# Patient Record
Sex: MALE | Race: WHITE | Employment: OTHER | URBAN - METROPOLITAN AREA
[De-identification: names, ages, dates, MRNs, and addresses within clinical notes are randomized per-mention and may not be internally consistent; named-entity substitution may affect disease eponyms.]

---

## 2021-01-01 ENCOUNTER — APPOINTMENT (OUTPATIENT)
Dept: GENERAL RADIOLOGY | Age: 66
DRG: 870 | End: 2021-01-01
Payer: COMMERCIAL

## 2021-01-01 ENCOUNTER — APPOINTMENT (OUTPATIENT)
Dept: CT IMAGING | Age: 66
DRG: 870 | End: 2021-01-01
Payer: COMMERCIAL

## 2021-01-01 ENCOUNTER — APPOINTMENT (OUTPATIENT)
Dept: ULTRASOUND IMAGING | Age: 66
DRG: 870 | End: 2021-01-01
Payer: COMMERCIAL

## 2021-01-01 ENCOUNTER — ANESTHESIA (OUTPATIENT)
Dept: INPATIENT UNIT | Age: 66
DRG: 870 | End: 2021-01-01
Payer: COMMERCIAL

## 2021-01-01 ENCOUNTER — APPOINTMENT (OUTPATIENT)
Dept: INTERVENTIONAL RADIOLOGY/VASCULAR | Age: 66
DRG: 870 | End: 2021-01-01
Payer: COMMERCIAL

## 2021-01-01 ENCOUNTER — ANESTHESIA EVENT (OUTPATIENT)
Dept: INPATIENT UNIT | Age: 66
DRG: 870 | End: 2021-01-01
Payer: COMMERCIAL

## 2021-01-01 ENCOUNTER — HOSPITAL ENCOUNTER (INPATIENT)
Age: 66
LOS: 28 days | DRG: 870 | End: 2021-11-24
Attending: EMERGENCY MEDICINE | Admitting: INTERNAL MEDICINE
Payer: COMMERCIAL

## 2021-01-01 VITALS
SYSTOLIC BLOOD PRESSURE: 99 MMHG | OXYGEN SATURATION: 88 % | HEART RATE: 76 BPM | BODY MASS INDEX: 32.98 KG/M2 | HEIGHT: 69 IN | WEIGHT: 222.66 LBS | DIASTOLIC BLOOD PRESSURE: 68 MMHG | RESPIRATION RATE: 20 BRPM | TEMPERATURE: 98 F

## 2021-01-01 DIAGNOSIS — J12.82 PNEUMONIA DUE TO COVID-19 VIRUS: Primary | ICD-10-CM

## 2021-01-01 DIAGNOSIS — R09.02 HYPOXEMIA: ICD-10-CM

## 2021-01-01 DIAGNOSIS — U07.1 PNEUMONIA DUE TO COVID-19 VIRUS: Primary | ICD-10-CM

## 2021-01-01 LAB
1,3 BETA-D-GLUCAN INTERP: NEGATIVE
1,3 BETA-D-GLUCAN: <31 PG/ML
ADENOVIRUS DETECTION BY PCR: NOT DETECTED
ALBUMIN SERPL-MCNC: 2.6 GM/DL (ref 3.4–5)
ALBUMIN SERPL-MCNC: 2.6 GM/DL (ref 3.4–5)
ALBUMIN SERPL-MCNC: 2.7 GM/DL (ref 3.4–5)
ALBUMIN SERPL-MCNC: 2.8 GM/DL (ref 3.4–5)
ALBUMIN SERPL-MCNC: 2.8 GM/DL (ref 3.4–5)
ALBUMIN SERPL-MCNC: 2.9 GM/DL (ref 3.4–5)
ALBUMIN SERPL-MCNC: 2.9 GM/DL (ref 3.4–5)
ALBUMIN SERPL-MCNC: 3 GM/DL (ref 3.4–5)
ALBUMIN SERPL-MCNC: 3.2 GM/DL (ref 3.4–5)
ALBUMIN SERPL-MCNC: 3.2 GM/DL (ref 3.4–5)
ALBUMIN SERPL-MCNC: 3.3 GM/DL (ref 3.4–5)
ALBUMIN SERPL-MCNC: 3.4 GM/DL (ref 3.4–5)
ALBUMIN SERPL-MCNC: 3.5 GM/DL (ref 3.4–5)
ALBUMIN SERPL-MCNC: 3.6 GM/DL (ref 3.4–5)
ALBUMIN SERPL-MCNC: 3.9 GM/DL (ref 3.4–5)
ALP BLD-CCNC: 58 IU/L (ref 40–128)
ALP BLD-CCNC: 60 IU/L (ref 40–129)
ALP BLD-CCNC: 64 IU/L (ref 40–129)
ALP BLD-CCNC: 67 IU/L (ref 40–128)
ALP BLD-CCNC: 69 IU/L (ref 40–128)
ALP BLD-CCNC: 69 IU/L (ref 40–129)
ALP BLD-CCNC: 70 IU/L (ref 40–129)
ALP BLD-CCNC: 71 IU/L (ref 40–128)
ALP BLD-CCNC: 72 IU/L (ref 40–128)
ALP BLD-CCNC: 72 IU/L (ref 40–128)
ALP BLD-CCNC: 74 IU/L (ref 40–128)
ALP BLD-CCNC: 74 IU/L (ref 40–129)
ALP BLD-CCNC: 75 IU/L (ref 40–129)
ALP BLD-CCNC: 77 IU/L (ref 40–128)
ALP BLD-CCNC: 77 IU/L (ref 40–129)
ALP BLD-CCNC: 78 IU/L (ref 40–128)
ALP BLD-CCNC: 79 IU/L (ref 40–128)
ALP BLD-CCNC: 80 IU/L (ref 40–128)
ALP BLD-CCNC: 81 IU/L (ref 40–129)
ALP BLD-CCNC: 82 IU/L (ref 40–128)
ALP BLD-CCNC: 84 IU/L (ref 40–128)
ALP BLD-CCNC: 88 IU/L (ref 40–128)
ALP BLD-CCNC: 94 IU/L (ref 40–128)
ALT SERPL-CCNC: 16 U/L (ref 10–40)
ALT SERPL-CCNC: 17 U/L (ref 10–40)
ALT SERPL-CCNC: 18 U/L (ref 10–40)
ALT SERPL-CCNC: 19 U/L (ref 10–40)
ALT SERPL-CCNC: 20 U/L (ref 10–40)
ALT SERPL-CCNC: 21 U/L (ref 10–40)
ALT SERPL-CCNC: 21 U/L (ref 10–40)
ALT SERPL-CCNC: 22 U/L (ref 10–40)
ALT SERPL-CCNC: 22 U/L (ref 10–40)
ALT SERPL-CCNC: 23 U/L (ref 10–40)
ALT SERPL-CCNC: 24 U/L (ref 10–40)
ALT SERPL-CCNC: 25 U/L (ref 10–40)
ALT SERPL-CCNC: 26 U/L (ref 10–40)
ALT SERPL-CCNC: 27 U/L (ref 10–40)
ALT SERPL-CCNC: 28 U/L (ref 10–40)
ALT SERPL-CCNC: 29 U/L (ref 10–40)
ALT SERPL-CCNC: 30 U/L (ref 10–40)
ANION GAP SERPL CALCULATED.3IONS-SCNC: 10 MMOL/L (ref 4–16)
ANION GAP SERPL CALCULATED.3IONS-SCNC: 10 MMOL/L (ref 4–16)
ANION GAP SERPL CALCULATED.3IONS-SCNC: 11 MMOL/L (ref 4–16)
ANION GAP SERPL CALCULATED.3IONS-SCNC: 11 MMOL/L (ref 4–16)
ANION GAP SERPL CALCULATED.3IONS-SCNC: 12 MMOL/L (ref 4–16)
ANION GAP SERPL CALCULATED.3IONS-SCNC: 13 MMOL/L (ref 4–16)
ANION GAP SERPL CALCULATED.3IONS-SCNC: 14 MMOL/L (ref 4–16)
ANION GAP SERPL CALCULATED.3IONS-SCNC: 14 MMOL/L (ref 4–16)
ANION GAP SERPL CALCULATED.3IONS-SCNC: 15 MMOL/L (ref 4–16)
ANION GAP SERPL CALCULATED.3IONS-SCNC: 15 MMOL/L (ref 4–16)
ANION GAP SERPL CALCULATED.3IONS-SCNC: 7 MMOL/L (ref 4–16)
ANION GAP SERPL CALCULATED.3IONS-SCNC: 7 MMOL/L (ref 4–16)
ANION GAP SERPL CALCULATED.3IONS-SCNC: 8 MMOL/L (ref 4–16)
ANION GAP SERPL CALCULATED.3IONS-SCNC: 9 MMOL/L (ref 4–16)
ANISOCYTOSIS: ABNORMAL
APTT: 29.8 SECONDS (ref 25.1–37.1)
ASPERGILLUS GALACTO AG: NEGATIVE
ASPERGILLUS GALACTO INDEX: 0.06
AST SERPL-CCNC: 15 IU/L (ref 15–37)
AST SERPL-CCNC: 17 IU/L (ref 15–37)
AST SERPL-CCNC: 18 IU/L (ref 15–37)
AST SERPL-CCNC: 18 IU/L (ref 15–37)
AST SERPL-CCNC: 19 IU/L (ref 15–37)
AST SERPL-CCNC: 21 IU/L (ref 15–37)
AST SERPL-CCNC: 22 IU/L (ref 15–37)
AST SERPL-CCNC: 23 IU/L (ref 15–37)
AST SERPL-CCNC: 24 IU/L (ref 15–37)
AST SERPL-CCNC: 26 IU/L (ref 15–37)
AST SERPL-CCNC: 27 IU/L (ref 15–37)
AST SERPL-CCNC: 28 IU/L (ref 15–37)
AST SERPL-CCNC: 29 IU/L (ref 15–37)
AST SERPL-CCNC: 29 IU/L (ref 15–37)
AST SERPL-CCNC: 30 IU/L (ref 15–37)
AST SERPL-CCNC: 31 IU/L (ref 15–37)
AST SERPL-CCNC: 32 IU/L (ref 15–37)
AST SERPL-CCNC: 33 IU/L (ref 15–37)
AST SERPL-CCNC: 35 IU/L (ref 15–37)
AST SERPL-CCNC: 37 IU/L (ref 15–37)
AST SERPL-CCNC: 37 IU/L (ref 15–37)
ATYPICAL LYMPHOCYTE ABSOLUTE COUNT: ABNORMAL
BACTERIA: ABNORMAL /HPF
BANDED NEUTROPHILS ABSOLUTE COUNT: 0.16 K/CU MM
BANDED NEUTROPHILS ABSOLUTE COUNT: 0.68 K/CU MM
BANDED NEUTROPHILS ABSOLUTE COUNT: 0.77 K/CU MM
BANDED NEUTROPHILS ABSOLUTE COUNT: 0.82 K/CU MM
BANDED NEUTROPHILS ABSOLUTE COUNT: 0.83 K/CU MM
BANDED NEUTROPHILS ABSOLUTE COUNT: 0.98 K/CU MM
BANDED NEUTROPHILS ABSOLUTE COUNT: 1.47 K/CU MM
BANDED NEUTROPHILS ABSOLUTE COUNT: 1.52 K/CU MM
BANDED NEUTROPHILS ABSOLUTE COUNT: 1.66 K/CU MM
BANDED NEUTROPHILS RELATIVE PERCENT: 10 % (ref 5–11)
BANDED NEUTROPHILS RELATIVE PERCENT: 10 % (ref 5–11)
BANDED NEUTROPHILS RELATIVE PERCENT: 18 % (ref 5–11)
BANDED NEUTROPHILS RELATIVE PERCENT: 2 % (ref 5–11)
BANDED NEUTROPHILS RELATIVE PERCENT: 22 % (ref 5–11)
BANDED NEUTROPHILS RELATIVE PERCENT: 4 % (ref 5–11)
BANDED NEUTROPHILS RELATIVE PERCENT: 4 % (ref 5–11)
BANDED NEUTROPHILS RELATIVE PERCENT: 6 % (ref 5–11)
BANDED NEUTROPHILS RELATIVE PERCENT: 6 % (ref 5–11)
BASE EXCESS MIXED: 0.5 (ref 0–1.2)
BASE EXCESS MIXED: 11.4 (ref 0–1.2)
BASE EXCESS MIXED: 16 (ref 0–1.2)
BASE EXCESS MIXED: 16.8 (ref 0–1.2)
BASE EXCESS MIXED: 17.2 (ref 0–1.2)
BASE EXCESS MIXED: 18.1 (ref 0–1.2)
BASE EXCESS MIXED: 2.8 (ref 0–1.2)
BASE EXCESS MIXED: 3 (ref 0–1.2)
BASE EXCESS MIXED: 3.5 (ref 0–1.2)
BASE EXCESS MIXED: 7.5 (ref 0–1.2)
BASE EXCESS MIXED: 8 (ref 0–1.2)
BASE EXCESS: 1 (ref 0–3.3)
BASE EXCESS: 2 (ref 0–3.3)
BASE EXCESS: 2 (ref 0–3.3)
BASE EXCESS: 3 (ref 0–3.3)
BASE EXCESS: 5 (ref 0–3.3)
BASE EXCESS: 6 (ref 0–3.3)
BASE EXCESS: 6 (ref 0–3.3)
BASE EXCESS: 7 (ref 0–3.3)
BASOPHILIC STIPPLING: PRESENT
BASOPHILIC STIPPLING: PRESENT
BASOPHILS ABSOLUTE: 0 K/CU MM
BASOPHILS ABSOLUTE: 0.1 K/CU MM
BASOPHILS ABSOLUTE: 0.1 K/CU MM
BASOPHILS RELATIVE PERCENT: 0.1 % (ref 0–1)
BASOPHILS RELATIVE PERCENT: 0.2 % (ref 0–1)
BASOPHILS RELATIVE PERCENT: 0.3 % (ref 0–1)
BASOPHILS RELATIVE PERCENT: 0.4 % (ref 0–1)
BILIRUB SERPL-MCNC: 0.5 MG/DL (ref 0–1)
BILIRUB SERPL-MCNC: 0.6 MG/DL (ref 0–1)
BILIRUB SERPL-MCNC: 0.7 MG/DL (ref 0–1)
BILIRUB SERPL-MCNC: 0.7 MG/DL (ref 0–1)
BILIRUB SERPL-MCNC: 0.8 MG/DL (ref 0–1)
BILIRUB SERPL-MCNC: 0.9 MG/DL (ref 0–1)
BILIRUB SERPL-MCNC: 1 MG/DL (ref 0–1)
BILIRUB SERPL-MCNC: 1.1 MG/DL (ref 0–1)
BILIRUB SERPL-MCNC: 1.2 MG/DL (ref 0–1)
BILIRUB SERPL-MCNC: 1.3 MG/DL (ref 0–1)
BILIRUBIN URINE: NEGATIVE MG/DL
BLOOD, URINE: ABNORMAL
BORDETELLA PARAPERTUSSIS BY PCR: NOT DETECTED
BORDETELLA PERTUSSIS PCR: NOT DETECTED
BUN BLDV-MCNC: 20 MG/DL (ref 6–23)
BUN BLDV-MCNC: 22 MG/DL (ref 6–23)
BUN BLDV-MCNC: 27 MG/DL (ref 6–23)
BUN BLDV-MCNC: 30 MG/DL (ref 6–23)
BUN BLDV-MCNC: 30 MG/DL (ref 6–23)
BUN BLDV-MCNC: 31 MG/DL (ref 6–23)
BUN BLDV-MCNC: 32 MG/DL (ref 6–23)
BUN BLDV-MCNC: 34 MG/DL (ref 6–23)
BUN BLDV-MCNC: 35 MG/DL (ref 6–23)
BUN BLDV-MCNC: 37 MG/DL (ref 6–23)
BUN BLDV-MCNC: 37 MG/DL (ref 6–23)
BUN BLDV-MCNC: 40 MG/DL (ref 6–23)
BUN BLDV-MCNC: 41 MG/DL (ref 6–23)
BUN BLDV-MCNC: 46 MG/DL (ref 6–23)
BUN BLDV-MCNC: 49 MG/DL (ref 6–23)
BUN BLDV-MCNC: 50 MG/DL (ref 6–23)
BUN BLDV-MCNC: 50 MG/DL (ref 6–23)
BUN BLDV-MCNC: 51 MG/DL (ref 6–23)
BUN BLDV-MCNC: 54 MG/DL (ref 6–23)
BUN BLDV-MCNC: 55 MG/DL (ref 6–23)
BUN BLDV-MCNC: 57 MG/DL (ref 6–23)
BUN BLDV-MCNC: 60 MG/DL (ref 6–23)
BUN BLDV-MCNC: 61 MG/DL (ref 6–23)
CALCIUM SERPL-MCNC: 8.3 MG/DL (ref 8.3–10.6)
CALCIUM SERPL-MCNC: 8.3 MG/DL (ref 8.3–10.6)
CALCIUM SERPL-MCNC: 8.5 MG/DL (ref 8.3–10.6)
CALCIUM SERPL-MCNC: 8.6 MG/DL (ref 8.3–10.6)
CALCIUM SERPL-MCNC: 8.6 MG/DL (ref 8.3–10.6)
CALCIUM SERPL-MCNC: 8.7 MG/DL (ref 8.3–10.6)
CALCIUM SERPL-MCNC: 8.7 MG/DL (ref 8.3–10.6)
CALCIUM SERPL-MCNC: 8.8 MG/DL (ref 8.3–10.6)
CALCIUM SERPL-MCNC: 8.8 MG/DL (ref 8.3–10.6)
CALCIUM SERPL-MCNC: 8.9 MG/DL (ref 8.3–10.6)
CALCIUM SERPL-MCNC: 9 MG/DL (ref 8.3–10.6)
CALCIUM SERPL-MCNC: 9.1 MG/DL (ref 8.3–10.6)
CALCIUM SERPL-MCNC: 9.3 MG/DL (ref 8.3–10.6)
CALCIUM SERPL-MCNC: 9.4 MG/DL (ref 8.3–10.6)
CALCIUM SERPL-MCNC: 9.4 MG/DL (ref 8.3–10.6)
CALCIUM SERPL-MCNC: 9.6 MG/DL (ref 8.3–10.6)
CARBON MONOXIDE, BLOOD: 1.6 % (ref 0–5)
CARBON MONOXIDE, BLOOD: 1.8 % (ref 0–5)
CARBON MONOXIDE, BLOOD: 1.8 % (ref 0–5)
CARBON MONOXIDE, BLOOD: 1.9 % (ref 0–5)
CARBON MONOXIDE, BLOOD: 2 % (ref 0–5)
CARBON MONOXIDE, BLOOD: 2 % (ref 0–5)
CARBON MONOXIDE, BLOOD: 2.1 % (ref 0–5)
CARBON MONOXIDE, BLOOD: 2.2 % (ref 0–5)
CARBON MONOXIDE, BLOOD: 2.3 % (ref 0–5)
CARBON MONOXIDE, BLOOD: 2.5 % (ref 0–5)
CARBON MONOXIDE, BLOOD: 3.1 % (ref 0–5)
CARBON MONOXIDE, BLOOD: 4.2 % (ref 0–5)
CHLAMYDOPHILA PNEUMONIA PCR: NOT DETECTED
CHLORIDE BLD-SCNC: 100 MMOL/L (ref 99–110)
CHLORIDE BLD-SCNC: 100 MMOL/L (ref 99–110)
CHLORIDE BLD-SCNC: 101 MMOL/L (ref 99–110)
CHLORIDE BLD-SCNC: 102 MMOL/L (ref 99–110)
CHLORIDE BLD-SCNC: 102 MMOL/L (ref 99–110)
CHLORIDE BLD-SCNC: 103 MMOL/L (ref 99–110)
CHLORIDE BLD-SCNC: 104 MMOL/L (ref 99–110)
CHLORIDE BLD-SCNC: 105 MMOL/L (ref 99–110)
CHLORIDE BLD-SCNC: 106 MMOL/L (ref 99–110)
CHLORIDE BLD-SCNC: 106 MMOL/L (ref 99–110)
CHLORIDE BLD-SCNC: 107 MMOL/L (ref 99–110)
CHLORIDE BLD-SCNC: 108 MMOL/L (ref 99–110)
CHLORIDE BLD-SCNC: 109 MMOL/L (ref 99–110)
CHLORIDE BLD-SCNC: 109 MMOL/L (ref 99–110)
CHLORIDE BLD-SCNC: 110 MMOL/L (ref 99–110)
CHLORIDE BLD-SCNC: 111 MMOL/L (ref 99–110)
CHLORIDE BLD-SCNC: 97 MMOL/L (ref 99–110)
CHLORIDE BLD-SCNC: 99 MMOL/L (ref 99–110)
CHLORIDE URINE RANDOM: 25 MMOL/L (ref 43–210)
CLARITY: ABNORMAL
CO2 CONTENT: 23.8 MMOL/L (ref 19–24)
CO2 CONTENT: 24.6 MMOL/L (ref 19–24)
CO2 CONTENT: 25.6 MMOL/L (ref 19–24)
CO2 CONTENT: 25.7 MMOL/L (ref 19–24)
CO2 CONTENT: 25.7 MMOL/L (ref 19–24)
CO2 CONTENT: 26 MMOL/L (ref 19–24)
CO2 CONTENT: 26.2 MMOL/L (ref 19–24)
CO2 CONTENT: 28.1 MMOL/L (ref 19–24)
CO2 CONTENT: 28.4 MMOL/L (ref 19–24)
CO2 CONTENT: 28.5 MMOL/L (ref 19–24)
CO2 CONTENT: 28.5 MMOL/L (ref 19–24)
CO2 CONTENT: 28.9 MMOL/L (ref 19–24)
CO2 CONTENT: 30.5 MMOL/L (ref 19–24)
CO2 CONTENT: 32.5 MMOL/L (ref 19–24)
CO2 CONTENT: 33.3 MMOL/L (ref 19–24)
CO2 CONTENT: 34.1 MMOL/L (ref 19–24)
CO2 CONTENT: 39.2 MMOL/L (ref 19–24)
CO2 CONTENT: 44.8 MMOL/L (ref 19–24)
CO2 CONTENT: 45.5 MMOL/L (ref 19–24)
CO2 CONTENT: 47.1 MMOL/L (ref 19–24)
CO2 CONTENT: 49.4 MMOL/L (ref 19–24)
CO2: 21 MMOL/L (ref 21–32)
CO2: 22 MMOL/L (ref 21–32)
CO2: 23 MMOL/L (ref 21–32)
CO2: 24 MMOL/L (ref 21–32)
CO2: 25 MMOL/L (ref 21–32)
CO2: 26 MMOL/L (ref 21–32)
CO2: 26 MMOL/L (ref 21–32)
CO2: 27 MMOL/L (ref 21–32)
CO2: 27 MMOL/L (ref 21–32)
CO2: 28 MMOL/L (ref 21–32)
CO2: 30 MMOL/L (ref 21–32)
CO2: 32 MMOL/L (ref 21–32)
CO2: 34 MMOL/L (ref 21–32)
CO2: 36 MMOL/L (ref 21–32)
CO2: 38 MMOL/L (ref 21–32)
CO2: 39 MMOL/L (ref 21–32)
COLOR: YELLOW
COMMENT: ABNORMAL
CORONAVIRUS 229E PCR: NOT DETECTED
CORONAVIRUS HKU1 PCR: NOT DETECTED
CORONAVIRUS NL63 PCR: NOT DETECTED
CORONAVIRUS OC43 PCR: NOT DETECTED
CREAT SERPL-MCNC: 0.6 MG/DL (ref 0.9–1.3)
CREAT SERPL-MCNC: 0.6 MG/DL (ref 0.9–1.3)
CREAT SERPL-MCNC: 0.7 MG/DL (ref 0.9–1.3)
CREAT SERPL-MCNC: 0.7 MG/DL (ref 0.9–1.3)
CREAT SERPL-MCNC: 0.8 MG/DL (ref 0.9–1.3)
CREAT SERPL-MCNC: 0.9 MG/DL (ref 0.9–1.3)
CREAT SERPL-MCNC: 1 MG/DL (ref 0.9–1.3)
CREAT SERPL-MCNC: 1.1 MG/DL (ref 0.9–1.3)
CREAT SERPL-MCNC: 1.3 MG/DL (ref 0.9–1.3)
CREAT SERPL-MCNC: 1.4 MG/DL (ref 0.9–1.3)
CREAT SERPL-MCNC: 1.6 MG/DL (ref 0.9–1.3)
CREATININE URINE: 75.8 MG/DL (ref 39–259)
CULTURE: ABNORMAL
CULTURE: ABNORMAL
CULTURE: NORMAL
D DIMER: 417 NG/ML(DDU)
D DIMER: 537 NG/ML(DDU)
D DIMER: 657 NG/ML(DDU)
D DIMER: 837 NG/ML(DDU)
DIFFERENTIAL TYPE: ABNORMAL
DOSE AMOUNT: ABNORMAL
DOSE AMOUNT: ABNORMAL
DOSE AMOUNT: NORMAL
DOSE AMOUNT: NORMAL
DOSE TIME: ABNORMAL
DOSE TIME: ABNORMAL
DOSE TIME: NORMAL
DOSE TIME: NORMAL
EKG ATRIAL RATE: 174 BPM
EKG ATRIAL RATE: 73 BPM
EKG ATRIAL RATE: 84 BPM
EKG DIAGNOSIS: NORMAL
EKG P AXIS: 33 DEGREES
EKG P AXIS: 37 DEGREES
EKG P-R INTERVAL: 136 MS
EKG P-R INTERVAL: 148 MS
EKG Q-T INTERVAL: 310 MS
EKG Q-T INTERVAL: 362 MS
EKG Q-T INTERVAL: 404 MS
EKG QRS DURATION: 90 MS
EKG QRS DURATION: 92 MS
EKG QRS DURATION: 94 MS
EKG QTC CALCULATION (BAZETT): 427 MS
EKG QTC CALCULATION (BAZETT): 445 MS
EKG QTC CALCULATION (BAZETT): 513 MS
EKG R AXIS: -11 DEGREES
EKG R AXIS: 3 DEGREES
EKG R AXIS: 6 DEGREES
EKG T AXIS: -7 DEGREES
EKG T AXIS: 221 DEGREES
EKG T AXIS: 254 DEGREES
EKG VENTRICULAR RATE: 165 BPM
EKG VENTRICULAR RATE: 73 BPM
EKG VENTRICULAR RATE: 84 BPM
EOSINOPHILS ABSOLUTE: 0 K/CU MM
EOSINOPHILS ABSOLUTE: 0.1 K/CU MM
EOSINOPHILS ABSOLUTE: 0.2 K/CU MM
EOSINOPHILS ABSOLUTE: 0.3 K/CU MM
EOSINOPHILS ABSOLUTE: 0.3 K/CU MM
EOSINOPHILS ABSOLUTE: 0.4 K/CU MM
EOSINOPHILS ABSOLUTE: 0.7 K/CU MM
EOSINOPHILS RELATIVE PERCENT: 0 % (ref 0–3)
EOSINOPHILS RELATIVE PERCENT: 0.1 % (ref 0–3)
EOSINOPHILS RELATIVE PERCENT: 0.3 % (ref 0–3)
EOSINOPHILS RELATIVE PERCENT: 0.3 % (ref 0–3)
EOSINOPHILS RELATIVE PERCENT: 0.4 % (ref 0–3)
EOSINOPHILS RELATIVE PERCENT: 0.5 % (ref 0–3)
EOSINOPHILS RELATIVE PERCENT: 0.6 % (ref 0–3)
EOSINOPHILS RELATIVE PERCENT: 0.7 % (ref 0–3)
EOSINOPHILS RELATIVE PERCENT: 0.9 % (ref 0–3)
EOSINOPHILS RELATIVE PERCENT: 1 % (ref 0–3)
EOSINOPHILS RELATIVE PERCENT: 1.2 % (ref 0–3)
EOSINOPHILS RELATIVE PERCENT: 1.2 % (ref 0–3)
EOSINOPHILS RELATIVE PERCENT: 1.9 % (ref 0–3)
EOSINOPHILS RELATIVE PERCENT: 2 % (ref 0–3)
EOSINOPHILS RELATIVE PERCENT: 8 % (ref 0–3)
ERYTHROCYTE SEDIMENTATION RATE: 37 MM/HR (ref 0–20)
FERRITIN: 1854 NG/ML (ref 30–400)
FERRITIN: 2544 NG/ML (ref 30–400)
FIBRINOGEN LEVEL: 952 MG/DL (ref 196.9–442.1)
FOLATE: 10.8 NG/ML (ref 3.1–17.5)
GFR AFRICAN AMERICAN: 52 ML/MIN/1.73M2
GFR AFRICAN AMERICAN: >60 ML/MIN/1.73M2
GFR NON-AFRICAN AMERICAN: 43 ML/MIN/1.73M2
GFR NON-AFRICAN AMERICAN: 51 ML/MIN/1.73M2
GFR NON-AFRICAN AMERICAN: 55 ML/MIN/1.73M2
GFR NON-AFRICAN AMERICAN: >60 ML/MIN/1.73M2
GLUCOSE BLD-MCNC: 106 MG/DL (ref 70–99)
GLUCOSE BLD-MCNC: 110 MG/DL (ref 70–99)
GLUCOSE BLD-MCNC: 115 MG/DL (ref 70–99)
GLUCOSE BLD-MCNC: 117 MG/DL (ref 70–99)
GLUCOSE BLD-MCNC: 118 MG/DL (ref 70–99)
GLUCOSE BLD-MCNC: 119 MG/DL (ref 70–99)
GLUCOSE BLD-MCNC: 120 MG/DL (ref 70–99)
GLUCOSE BLD-MCNC: 127 MG/DL (ref 70–99)
GLUCOSE BLD-MCNC: 128 MG/DL (ref 70–99)
GLUCOSE BLD-MCNC: 128 MG/DL (ref 70–99)
GLUCOSE BLD-MCNC: 129 MG/DL (ref 70–99)
GLUCOSE BLD-MCNC: 131 MG/DL (ref 70–99)
GLUCOSE BLD-MCNC: 134 MG/DL (ref 70–99)
GLUCOSE BLD-MCNC: 135 MG/DL (ref 70–99)
GLUCOSE BLD-MCNC: 141 MG/DL (ref 70–99)
GLUCOSE BLD-MCNC: 148 MG/DL (ref 70–99)
GLUCOSE BLD-MCNC: 150 MG/DL (ref 70–99)
GLUCOSE BLD-MCNC: 157 MG/DL (ref 70–99)
GLUCOSE BLD-MCNC: 171 MG/DL (ref 70–99)
GLUCOSE BLD-MCNC: 171 MG/DL (ref 70–99)
GLUCOSE BLD-MCNC: 175 MG/DL (ref 70–99)
GLUCOSE BLD-MCNC: 193 MG/DL (ref 70–99)
GLUCOSE BLD-MCNC: 202 MG/DL (ref 70–99)
GLUCOSE BLD-MCNC: 202 MG/DL (ref 70–99)
GLUCOSE BLD-MCNC: 82 MG/DL (ref 70–99)
GLUCOSE BLD-MCNC: 85 MG/DL (ref 70–99)
GLUCOSE BLD-MCNC: 93 MG/DL (ref 70–99)
GLUCOSE BLD-MCNC: 96 MG/DL (ref 70–99)
GLUCOSE, URINE: NEGATIVE MG/DL
GRAM SMEAR: NORMAL
HCO3 ARTERIAL: 22.1 MMOL/L (ref 18–23)
HCO3 ARTERIAL: 22.6 MMOL/L (ref 18–23)
HCO3 ARTERIAL: 23.9 MMOL/L (ref 18–23)
HCO3 ARTERIAL: 24.2 MMOL/L (ref 18–23)
HCO3 ARTERIAL: 24.2 MMOL/L (ref 18–23)
HCO3 ARTERIAL: 24.6 MMOL/L (ref 18–23)
HCO3 ARTERIAL: 24.7 MMOL/L (ref 18–23)
HCO3 ARTERIAL: 25.7 MMOL/L (ref 18–23)
HCO3 ARTERIAL: 27 MMOL/L (ref 18–23)
HCO3 ARTERIAL: 27 MMOL/L (ref 18–23)
HCO3 ARTERIAL: 27.1 MMOL/L (ref 18–23)
HCO3 ARTERIAL: 27.2 MMOL/L (ref 18–23)
HCO3 ARTERIAL: 29 MMOL/L (ref 18–23)
HCO3 ARTERIAL: 30.8 MMOL/L (ref 18–23)
HCO3 ARTERIAL: 32 MMOL/L (ref 18–23)
HCO3 ARTERIAL: 32.7 MMOL/L (ref 18–23)
HCO3 ARTERIAL: 37.5 MMOL/L (ref 18–23)
HCO3 ARTERIAL: 43.1 MMOL/L (ref 18–23)
HCO3 ARTERIAL: 43.7 MMOL/L (ref 18–23)
HCO3 ARTERIAL: 44.8 MMOL/L (ref 18–23)
HCO3 ARTERIAL: 47.1 MMOL/L (ref 18–23)
HCT VFR BLD CALC: 26.5 % (ref 42–52)
HCT VFR BLD CALC: 27.9 % (ref 42–52)
HCT VFR BLD CALC: 28.8 % (ref 42–52)
HCT VFR BLD CALC: 29.3 % (ref 42–52)
HCT VFR BLD CALC: 29.5 % (ref 42–52)
HCT VFR BLD CALC: 29.6 % (ref 42–52)
HCT VFR BLD CALC: 29.8 % (ref 42–52)
HCT VFR BLD CALC: 30.5 % (ref 42–52)
HCT VFR BLD CALC: 31.1 % (ref 42–52)
HCT VFR BLD CALC: 31.1 % (ref 42–52)
HCT VFR BLD CALC: 33.5 % (ref 42–52)
HCT VFR BLD CALC: 34.7 % (ref 42–52)
HCT VFR BLD CALC: 35.7 % (ref 42–52)
HCT VFR BLD CALC: 37.5 % (ref 42–52)
HCT VFR BLD CALC: 40.9 % (ref 42–52)
HCT VFR BLD CALC: 43.3 % (ref 42–52)
HCT VFR BLD CALC: 44.1 % (ref 42–52)
HCT VFR BLD CALC: 44.4 % (ref 42–52)
HCT VFR BLD CALC: 45.2 % (ref 42–52)
HCT VFR BLD CALC: 46.6 % (ref 42–52)
HCT VFR BLD CALC: 47.4 % (ref 42–52)
HCT VFR BLD CALC: 50.2 % (ref 42–52)
HCT VFR BLD CALC: 50.6 % (ref 42–52)
HCT VFR BLD CALC: 50.9 % (ref 42–52)
HCT VFR BLD CALC: 53.7 % (ref 42–52)
HEMOGLOBIN: 10 GM/DL (ref 13.5–18)
HEMOGLOBIN: 10 GM/DL (ref 13.5–18)
HEMOGLOBIN: 10.7 GM/DL (ref 13.5–18)
HEMOGLOBIN: 11.1 GM/DL (ref 13.5–18)
HEMOGLOBIN: 11.7 GM/DL (ref 13.5–18)
HEMOGLOBIN: 11.8 GM/DL (ref 13.5–18)
HEMOGLOBIN: 13.7 GM/DL (ref 13.5–18)
HEMOGLOBIN: 14.4 GM/DL (ref 13.5–18)
HEMOGLOBIN: 14.8 GM/DL (ref 13.5–18)
HEMOGLOBIN: 15 GM/DL (ref 13.5–18)
HEMOGLOBIN: 15.1 GM/DL (ref 13.5–18)
HEMOGLOBIN: 15.2 GM/DL (ref 13.5–18)
HEMOGLOBIN: 15.2 GM/DL (ref 13.5–18)
HEMOGLOBIN: 15.3 GM/DL (ref 13.5–18)
HEMOGLOBIN: 15.4 GM/DL (ref 13.5–18)
HEMOGLOBIN: 15.9 GM/DL (ref 13.5–18)
HEMOGLOBIN: 16 GM/DL (ref 13.5–18)
HEMOGLOBIN: 16.1 GM/DL (ref 13.5–18)
HEMOGLOBIN: 16.2 GM/DL (ref 13.5–18)
HEMOGLOBIN: 8.8 GM/DL (ref 13.5–18)
HEMOGLOBIN: 9.2 GM/DL (ref 13.5–18)
HEMOGLOBIN: 9.3 GM/DL (ref 13.5–18)
HEMOGLOBIN: 9.4 GM/DL (ref 13.5–18)
HEMOGLOBIN: 9.6 GM/DL (ref 13.5–18)
HEMOGLOBIN: 9.6 GM/DL (ref 13.5–18)
HEMOGLOBIN: 9.8 GM/DL (ref 13.5–18)
HEMOGLOBIN: 9.8 GM/DL (ref 13.5–18)
HIGH SENSITIVE C-REACTIVE PROTEIN: 104.1 MG/L
HIGH SENSITIVE C-REACTIVE PROTEIN: 187.5 MG/L
HIGH SENSITIVE C-REACTIVE PROTEIN: 236.6 MG/L
HIGH SENSITIVE C-REACTIVE PROTEIN: 248.1 MG/L
HIGH SENSITIVE C-REACTIVE PROTEIN: 257.1 MG/L
HIGH SENSITIVE C-REACTIVE PROTEIN: 45.5 MG/L
HIGH SENSITIVE C-REACTIVE PROTEIN: 57.2 MG/L
HIGH SENSITIVE C-REACTIVE PROTEIN: 63.3 MG/L
HIGH SENSITIVE C-REACTIVE PROTEIN: >300 MG/L
HIGH SENSITIVE C-REACTIVE PROTEIN: >300 MG/L
HUMAN METAPNEUMOVIRUS PCR: NOT DETECTED
IGA: 135 MG/DL (ref 69–382)
IGG,SERUM: 543 MG/DL (ref 723–1685)
IGM,SERUM: 62 MG/DL (ref 62–277)
IMMATURE NEUTROPHIL %: 0.5 % (ref 0–0.43)
IMMATURE NEUTROPHIL %: 0.9 % (ref 0–0.43)
IMMATURE NEUTROPHIL %: 0.9 % (ref 0–0.43)
IMMATURE NEUTROPHIL %: 1 % (ref 0–0.43)
IMMATURE NEUTROPHIL %: 1.1 % (ref 0–0.43)
IMMATURE NEUTROPHIL %: 1.1 % (ref 0–0.43)
IMMATURE NEUTROPHIL %: 1.3 % (ref 0–0.43)
IMMATURE NEUTROPHIL %: 1.3 % (ref 0–0.43)
IMMATURE NEUTROPHIL %: 1.4 % (ref 0–0.43)
IMMATURE NEUTROPHIL %: 1.6 % (ref 0–0.43)
IMMATURE NEUTROPHIL %: 1.7 % (ref 0–0.43)
IMMATURE NEUTROPHIL %: 2.2 % (ref 0–0.43)
IMMATURE NEUTROPHIL %: 2.3 % (ref 0–0.43)
IMMATURE NEUTROPHIL %: 2.6 % (ref 0–0.43)
IMMATURE NEUTROPHIL %: 2.6 % (ref 0–0.43)
IMMATURE NEUTROPHIL %: 2.7 % (ref 0–0.43)
IMMATURE NEUTROPHIL %: 2.9 % (ref 0–0.43)
IMMATURE NEUTROPHIL %: 4.6 % (ref 0–0.43)
INFLUENZA A BY PCR: NOT DETECTED
INFLUENZA A H1 (2009) PCR: NOT DETECTED
INFLUENZA A H1 PANDEMIC PCR: NOT DETECTED
INFLUENZA A H3 PCR: NOT DETECTED
INFLUENZA B BY PCR: NOT DETECTED
INR BLD: 1.05 INDEX
IRON: 68 UG/DL (ref 59–158)
KETONES, URINE: NEGATIVE MG/DL
LACTATE DEHYDROGENASE: 577 IU/L (ref 120–246)
LACTATE: 1 MMOL/L (ref 0.4–2)
LACTATE: 1.3 MMOL/L (ref 0.4–2)
LACTIC ACID, SEPSIS: 1.8 MMOL/L (ref 0.5–1.9)
LEGIONELLA URINARY AG: NEGATIVE
LEUKOCYTE ESTERASE, URINE: NEGATIVE
LV EF: 55 %
LVEF MODALITY: NORMAL
LYMPHOCYTES ABSOLUTE: 0.3 K/CU MM
LYMPHOCYTES ABSOLUTE: 0.3 K/CU MM
LYMPHOCYTES ABSOLUTE: 0.4 K/CU MM
LYMPHOCYTES ABSOLUTE: 0.5 K/CU MM
LYMPHOCYTES ABSOLUTE: 0.6 K/CU MM
LYMPHOCYTES ABSOLUTE: 0.7 K/CU MM
LYMPHOCYTES ABSOLUTE: 0.8 K/CU MM
LYMPHOCYTES ABSOLUTE: 0.9 K/CU MM
LYMPHOCYTES ABSOLUTE: 0.9 K/CU MM
LYMPHOCYTES ABSOLUTE: 1 K/CU MM
LYMPHOCYTES ABSOLUTE: 1.2 K/CU MM
LYMPHOCYTES RELATIVE PERCENT: 10 % (ref 24–44)
LYMPHOCYTES RELATIVE PERCENT: 11 % (ref 24–44)
LYMPHOCYTES RELATIVE PERCENT: 2 % (ref 24–44)
LYMPHOCYTES RELATIVE PERCENT: 2.6 % (ref 24–44)
LYMPHOCYTES RELATIVE PERCENT: 3 % (ref 24–44)
LYMPHOCYTES RELATIVE PERCENT: 3.1 % (ref 24–44)
LYMPHOCYTES RELATIVE PERCENT: 3.3 % (ref 24–44)
LYMPHOCYTES RELATIVE PERCENT: 3.4 % (ref 24–44)
LYMPHOCYTES RELATIVE PERCENT: 3.7 % (ref 24–44)
LYMPHOCYTES RELATIVE PERCENT: 3.8 % (ref 24–44)
LYMPHOCYTES RELATIVE PERCENT: 3.9 % (ref 24–44)
LYMPHOCYTES RELATIVE PERCENT: 4 % (ref 24–44)
LYMPHOCYTES RELATIVE PERCENT: 4.2 % (ref 24–44)
LYMPHOCYTES RELATIVE PERCENT: 4.2 % (ref 24–44)
LYMPHOCYTES RELATIVE PERCENT: 4.3 % (ref 24–44)
LYMPHOCYTES RELATIVE PERCENT: 4.6 % (ref 24–44)
LYMPHOCYTES RELATIVE PERCENT: 4.7 % (ref 24–44)
LYMPHOCYTES RELATIVE PERCENT: 5 % (ref 24–44)
LYMPHOCYTES RELATIVE PERCENT: 5 % (ref 24–44)
LYMPHOCYTES RELATIVE PERCENT: 5.4 % (ref 24–44)
LYMPHOCYTES RELATIVE PERCENT: 5.5 % (ref 24–44)
LYMPHOCYTES RELATIVE PERCENT: 6 % (ref 24–44)
LYMPHOCYTES RELATIVE PERCENT: 6 % (ref 24–44)
LYMPHOCYTES RELATIVE PERCENT: 6.3 % (ref 24–44)
LYMPHOCYTES RELATIVE PERCENT: 8.6 % (ref 24–44)
LYMPHOCYTES RELATIVE PERCENT: 9.2 % (ref 24–44)
LYMPHOCYTES RELATIVE PERCENT: 9.9 % (ref 24–44)
Lab: ABNORMAL
Lab: NORMAL
MACROCYTES: ABNORMAL
MAGNESIUM: 1.6 MG/DL (ref 1.8–2.4)
MAGNESIUM: 1.7 MG/DL (ref 1.8–2.4)
MAGNESIUM: 2 MG/DL (ref 1.8–2.4)
MAGNESIUM: 2 MG/DL (ref 1.8–2.4)
MAGNESIUM: 2.1 MG/DL (ref 1.8–2.4)
MAGNESIUM: 2.2 MG/DL (ref 1.8–2.4)
MAGNESIUM: 2.2 MG/DL (ref 1.8–2.4)
MAGNESIUM: 2.3 MG/DL (ref 1.8–2.4)
MAGNESIUM: 2.4 MG/DL (ref 1.8–2.4)
MCH RBC QN AUTO: 31.1 PG (ref 27–31)
MCH RBC QN AUTO: 31.3 PG (ref 27–31)
MCH RBC QN AUTO: 31.4 PG (ref 27–31)
MCH RBC QN AUTO: 31.5 PG (ref 27–31)
MCH RBC QN AUTO: 31.6 PG (ref 27–31)
MCH RBC QN AUTO: 31.7 PG (ref 27–31)
MCH RBC QN AUTO: 31.7 PG (ref 27–31)
MCH RBC QN AUTO: 31.8 PG (ref 27–31)
MCH RBC QN AUTO: 31.8 PG (ref 27–31)
MCH RBC QN AUTO: 31.9 PG (ref 27–31)
MCH RBC QN AUTO: 31.9 PG (ref 27–31)
MCH RBC QN AUTO: 32.1 PG (ref 27–31)
MCH RBC QN AUTO: 32.1 PG (ref 27–31)
MCH RBC QN AUTO: 32.4 PG (ref 27–31)
MCHC RBC AUTO-ENTMCNC: 30 % (ref 32–36)
MCHC RBC AUTO-ENTMCNC: 31.4 % (ref 32–36)
MCHC RBC AUTO-ENTMCNC: 31.4 % (ref 32–36)
MCHC RBC AUTO-ENTMCNC: 31.5 % (ref 32–36)
MCHC RBC AUTO-ENTMCNC: 31.8 % (ref 32–36)
MCHC RBC AUTO-ENTMCNC: 31.9 % (ref 32–36)
MCHC RBC AUTO-ENTMCNC: 31.9 % (ref 32–36)
MCHC RBC AUTO-ENTMCNC: 32 % (ref 32–36)
MCHC RBC AUTO-ENTMCNC: 32.1 % (ref 32–36)
MCHC RBC AUTO-ENTMCNC: 32.1 % (ref 32–36)
MCHC RBC AUTO-ENTMCNC: 32.2 % (ref 32–36)
MCHC RBC AUTO-ENTMCNC: 32.2 % (ref 32–36)
MCHC RBC AUTO-ENTMCNC: 32.5 % (ref 32–36)
MCHC RBC AUTO-ENTMCNC: 32.5 % (ref 32–36)
MCHC RBC AUTO-ENTMCNC: 32.6 % (ref 32–36)
MCHC RBC AUTO-ENTMCNC: 32.8 % (ref 32–36)
MCHC RBC AUTO-ENTMCNC: 32.9 % (ref 32–36)
MCHC RBC AUTO-ENTMCNC: 33 % (ref 32–36)
MCHC RBC AUTO-ENTMCNC: 33.2 % (ref 32–36)
MCHC RBC AUTO-ENTMCNC: 33.2 % (ref 32–36)
MCHC RBC AUTO-ENTMCNC: 33.3 % (ref 32–36)
MCHC RBC AUTO-ENTMCNC: 33.5 % (ref 32–36)
MCHC RBC AUTO-ENTMCNC: 33.6 % (ref 32–36)
MCHC RBC AUTO-ENTMCNC: 33.8 % (ref 32–36)
MCHC RBC AUTO-ENTMCNC: 34.2 % (ref 32–36)
MCV RBC AUTO: 100.2 FL (ref 78–100)
MCV RBC AUTO: 100.3 FL (ref 78–100)
MCV RBC AUTO: 108 FL (ref 78–100)
MCV RBC AUTO: 93.8 FL (ref 78–100)
MCV RBC AUTO: 93.8 FL (ref 78–100)
MCV RBC AUTO: 94.2 FL (ref 78–100)
MCV RBC AUTO: 94.6 FL (ref 78–100)
MCV RBC AUTO: 94.8 FL (ref 78–100)
MCV RBC AUTO: 94.9 FL (ref 78–100)
MCV RBC AUTO: 94.9 FL (ref 78–100)
MCV RBC AUTO: 95.3 FL (ref 78–100)
MCV RBC AUTO: 95.4 FL (ref 78–100)
MCV RBC AUTO: 95.5 FL (ref 78–100)
MCV RBC AUTO: 95.9 FL (ref 78–100)
MCV RBC AUTO: 96 FL (ref 78–100)
MCV RBC AUTO: 96.3 FL (ref 78–100)
MCV RBC AUTO: 96.4 FL (ref 78–100)
MCV RBC AUTO: 97 FL (ref 78–100)
MCV RBC AUTO: 97.2 FL (ref 78–100)
MCV RBC AUTO: 97.3 FL (ref 78–100)
MCV RBC AUTO: 98.4 FL (ref 78–100)
MCV RBC AUTO: 98.4 FL (ref 78–100)
MCV RBC AUTO: 98.5 FL (ref 78–100)
MCV RBC AUTO: 98.5 FL (ref 78–100)
MCV RBC AUTO: 98.6 FL (ref 78–100)
MCV RBC AUTO: 99.4 FL (ref 78–100)
MCV RBC AUTO: 99.7 FL (ref 78–100)
METAMYELOCYTES ABSOLUTE COUNT: 0.07 K/CU MM
METAMYELOCYTES ABSOLUTE COUNT: 0.08 K/CU MM
METAMYELOCYTES ABSOLUTE COUNT: 0.14 K/CU MM
METAMYELOCYTES ABSOLUTE COUNT: 0.15 K/CU MM
METAMYELOCYTES ABSOLUTE COUNT: 0.16 K/CU MM
METAMYELOCYTES ABSOLUTE COUNT: 0.18 K/CU MM
METAMYELOCYTES ABSOLUTE COUNT: 0.21 K/CU MM
METAMYELOCYTES PERCENT: 1 %
METAMYELOCYTES PERCENT: 2 %
METHEMOGLOBIN ARTERIAL: 0.5 %
METHEMOGLOBIN ARTERIAL: 0.8 %
METHEMOGLOBIN ARTERIAL: 0.9 %
METHEMOGLOBIN ARTERIAL: 0.9 %
METHEMOGLOBIN ARTERIAL: 1 %
METHEMOGLOBIN ARTERIAL: 1 %
METHEMOGLOBIN ARTERIAL: 1.1 %
METHEMOGLOBIN ARTERIAL: 1.1 %
METHEMOGLOBIN ARTERIAL: 1.2 %
METHEMOGLOBIN ARTERIAL: 1.2 %
METHEMOGLOBIN ARTERIAL: 1.3 %
METHEMOGLOBIN ARTERIAL: 1.4 %
METHEMOGLOBIN ARTERIAL: 1.5 %
METHEMOGLOBIN ARTERIAL: 1.6 %
METHEMOGLOBIN ARTERIAL: 1.6 %
METHEMOGLOBIN ARTERIAL: 1.7 %
METHEMOGLOBIN ARTERIAL: 1.8 %
MONOCYTES ABSOLUTE: 0.1 K/CU MM
MONOCYTES ABSOLUTE: 0.2 K/CU MM
MONOCYTES ABSOLUTE: 0.3 K/CU MM
MONOCYTES ABSOLUTE: 0.4 K/CU MM
MONOCYTES ABSOLUTE: 0.4 K/CU MM
MONOCYTES ABSOLUTE: 0.5 K/CU MM
MONOCYTES ABSOLUTE: 0.6 K/CU MM
MONOCYTES ABSOLUTE: 0.7 K/CU MM
MONOCYTES ABSOLUTE: 0.8 K/CU MM
MONOCYTES ABSOLUTE: 0.8 K/CU MM
MONOCYTES ABSOLUTE: 0.9 K/CU MM
MONOCYTES ABSOLUTE: 1 K/CU MM
MONOCYTES ABSOLUTE: 1.3 K/CU MM
MONOCYTES RELATIVE PERCENT: 1 % (ref 0–4)
MONOCYTES RELATIVE PERCENT: 2.2 % (ref 0–4)
MONOCYTES RELATIVE PERCENT: 2.2 % (ref 0–4)
MONOCYTES RELATIVE PERCENT: 2.9 % (ref 0–4)
MONOCYTES RELATIVE PERCENT: 3 % (ref 0–4)
MONOCYTES RELATIVE PERCENT: 3.3 % (ref 0–4)
MONOCYTES RELATIVE PERCENT: 3.3 % (ref 0–4)
MONOCYTES RELATIVE PERCENT: 3.5 % (ref 0–4)
MONOCYTES RELATIVE PERCENT: 3.6 % (ref 0–4)
MONOCYTES RELATIVE PERCENT: 3.8 % (ref 0–4)
MONOCYTES RELATIVE PERCENT: 4 % (ref 0–4)
MONOCYTES RELATIVE PERCENT: 4.1 % (ref 0–4)
MONOCYTES RELATIVE PERCENT: 4.2 % (ref 0–4)
MONOCYTES RELATIVE PERCENT: 4.3 % (ref 0–4)
MONOCYTES RELATIVE PERCENT: 4.8 % (ref 0–4)
MONOCYTES RELATIVE PERCENT: 4.8 % (ref 0–4)
MONOCYTES RELATIVE PERCENT: 4.9 % (ref 0–4)
MONOCYTES RELATIVE PERCENT: 5 % (ref 0–4)
MONOCYTES RELATIVE PERCENT: 6.8 % (ref 0–4)
MUCUS: ABNORMAL HPF
MYCOPLASMA PNEUMONIAE PCR: NOT DETECTED
MYELOCYTE PERCENT: 1 %
MYELOCYTES ABSOLUTE COUNT: 0.21 K/CU MM
NITRITE URINE, QUANTITATIVE: NEGATIVE
NUCLEATED RBC %: 0 %
NUCLEATED RBC %: 0.3 %
O2 SATURATION: 88.9 % (ref 96–97)
O2 SATURATION: 89.7 % (ref 96–97)
O2 SATURATION: 90 % (ref 96–97)
O2 SATURATION: 91.3 % (ref 96–97)
O2 SATURATION: 92 % (ref 96–97)
O2 SATURATION: 92.1 % (ref 96–97)
O2 SATURATION: 92.1 % (ref 96–97)
O2 SATURATION: 92.9 % (ref 96–97)
O2 SATURATION: 93.3 % (ref 96–97)
O2 SATURATION: 93.8 % (ref 96–97)
O2 SATURATION: 94 % (ref 96–97)
O2 SATURATION: 94 % (ref 96–97)
O2 SATURATION: 94.6 % (ref 96–97)
O2 SATURATION: 94.6 % (ref 96–97)
O2 SATURATION: 95 % (ref 96–97)
O2 SATURATION: 95 % (ref 96–97)
O2 SATURATION: 95.1 % (ref 96–97)
O2 SATURATION: 95.1 % (ref 96–97)
O2 SATURATION: 95.2 % (ref 96–97)
O2 SATURATION: 95.4 % (ref 96–97)
O2 SATURATION: 96.3 % (ref 96–97)
PARAINFLUENZA 1 PCR: NOT DETECTED
PARAINFLUENZA 2 PCR: NOT DETECTED
PARAINFLUENZA 3 PCR: NOT DETECTED
PARAINFLUENZA 4 PCR: NOT DETECTED
PCO2 ARTERIAL: 37 MMHG (ref 32–45)
PCO2 ARTERIAL: 40 MMHG (ref 32–45)
PCO2 ARTERIAL: 41 MMHG (ref 32–45)
PCO2 ARTERIAL: 47 MMHG (ref 32–45)
PCO2 ARTERIAL: 47 MMHG (ref 32–45)
PCO2 ARTERIAL: 48 MMHG (ref 32–45)
PCO2 ARTERIAL: 48 MMHG (ref 32–45)
PCO2 ARTERIAL: 49 MMHG (ref 32–45)
PCO2 ARTERIAL: 50 MMHG (ref 32–45)
PCO2 ARTERIAL: 50 MMHG (ref 32–45)
PCO2 ARTERIAL: 54 MMHG (ref 32–45)
PCO2 ARTERIAL: 57 MMHG (ref 32–45)
PCO2 ARTERIAL: 59 MMHG (ref 32–45)
PCO2 ARTERIAL: 60 MMHG (ref 32–45)
PCO2 ARTERIAL: 65 MMHG (ref 32–45)
PCO2 ARTERIAL: 67 MMHG (ref 32–45)
PCO2 ARTERIAL: 74 MMHG (ref 32–45)
PCO2 ARTERIAL: 76 MMHG (ref 32–45)
PCO2 ARTERIAL: 79 MMHG (ref 32–45)
PCT TRANSFERRIN: 47 % (ref 10–44)
PDW BLD-RTO: 13 % (ref 11.7–14.9)
PDW BLD-RTO: 13.2 % (ref 11.7–14.9)
PDW BLD-RTO: 13.3 % (ref 11.7–14.9)
PDW BLD-RTO: 13.4 % (ref 11.7–14.9)
PDW BLD-RTO: 13.5 % (ref 11.7–14.9)
PDW BLD-RTO: 13.6 % (ref 11.7–14.9)
PDW BLD-RTO: 13.9 % (ref 11.7–14.9)
PDW BLD-RTO: 14.2 % (ref 11.7–14.9)
PDW BLD-RTO: 14.3 % (ref 11.7–14.9)
PDW BLD-RTO: 14.4 % (ref 11.7–14.9)
PDW BLD-RTO: 14.5 % (ref 11.7–14.9)
PDW BLD-RTO: 14.6 % (ref 11.7–14.9)
PDW BLD-RTO: 14.6 % (ref 11.7–14.9)
PDW BLD-RTO: 14.8 % (ref 11.7–14.9)
PDW BLD-RTO: 15 % (ref 11.7–14.9)
PDW BLD-RTO: 15.1 % (ref 11.7–14.9)
PH BLOOD: 7.12 (ref 7.34–7.45)
PH BLOOD: 7.15 (ref 7.34–7.45)
PH BLOOD: 7.16 (ref 7.34–7.45)
PH BLOOD: 7.22 (ref 7.34–7.45)
PH BLOOD: 7.27 (ref 7.34–7.45)
PH BLOOD: 7.31 (ref 7.34–7.45)
PH BLOOD: 7.32 (ref 7.34–7.45)
PH BLOOD: 7.32 (ref 7.34–7.45)
PH BLOOD: 7.34 (ref 7.34–7.45)
PH BLOOD: 7.38 (ref 7.34–7.45)
PH BLOOD: 7.39 (ref 7.34–7.45)
PH BLOOD: 7.4 (ref 7.34–7.45)
PH BLOOD: 7.43 (ref 7.34–7.45)
PH BLOOD: 7.44 (ref 7.34–7.45)
PH BLOOD: 7.45 (ref 7.34–7.45)
PH BLOOD: 7.45 (ref 7.34–7.45)
PH BLOOD: 7.47 (ref 7.34–7.45)
PH BLOOD: 7.5 (ref 7.34–7.45)
PH BLOOD: 7.51 (ref 7.34–7.45)
PH, URINE: 5 (ref 5–8)
PHOSPHORUS: 2.5 MG/DL (ref 2.5–4.9)
PHOSPHORUS: 2.7 MG/DL (ref 2.5–4.9)
PHOSPHORUS: 2.7 MG/DL (ref 2.5–4.9)
PHOSPHORUS: 2.8 MG/DL (ref 2.5–4.9)
PHOSPHORUS: 3 MG/DL (ref 2.5–4.9)
PHOSPHORUS: 3.1 MG/DL (ref 2.5–4.9)
PHOSPHORUS: 3.1 MG/DL (ref 2.5–4.9)
PLATELET # BLD: 117 K/CU MM (ref 140–440)
PLATELET # BLD: 127 K/CU MM (ref 140–440)
PLATELET # BLD: 132 K/CU MM (ref 140–440)
PLATELET # BLD: 135 K/CU MM (ref 140–440)
PLATELET # BLD: 136 K/CU MM (ref 140–440)
PLATELET # BLD: 137 K/CU MM (ref 140–440)
PLATELET # BLD: 138 K/CU MM (ref 140–440)
PLATELET # BLD: 143 K/CU MM (ref 140–440)
PLATELET # BLD: 153 K/CU MM (ref 140–440)
PLATELET # BLD: 157 K/CU MM (ref 140–440)
PLATELET # BLD: 160 K/CU MM (ref 140–440)
PLATELET # BLD: 165 K/CU MM (ref 140–440)
PLATELET # BLD: 171 K/CU MM (ref 140–440)
PLATELET # BLD: 182 K/CU MM (ref 140–440)
PLATELET # BLD: 195 K/CU MM (ref 140–440)
PLATELET # BLD: 218 K/CU MM (ref 140–440)
PLATELET # BLD: 294 K/CU MM (ref 140–440)
PLATELET # BLD: 302 K/CU MM (ref 140–440)
PLATELET # BLD: 305 K/CU MM (ref 140–440)
PLATELET # BLD: 313 K/CU MM (ref 140–440)
PLATELET # BLD: 330 K/CU MM (ref 140–440)
PLATELET # BLD: 334 K/CU MM (ref 140–440)
PLATELET # BLD: 344 K/CU MM (ref 140–440)
PLATELET # BLD: 355 K/CU MM (ref 140–440)
PLATELET # BLD: 366 K/CU MM (ref 140–440)
PLATELET # BLD: 388 K/CU MM (ref 140–440)
PLATELET # BLD: 403 K/CU MM (ref 140–440)
PLT MORPHOLOGY: ABNORMAL
PLT MORPHOLOGY: ADEQUATE
PMV BLD AUTO: 10 FL (ref 7.5–11.1)
PMV BLD AUTO: 10.1 FL (ref 7.5–11.1)
PMV BLD AUTO: 10.2 FL (ref 7.5–11.1)
PMV BLD AUTO: 10.2 FL (ref 7.5–11.1)
PMV BLD AUTO: 10.3 FL (ref 7.5–11.1)
PMV BLD AUTO: 10.4 FL (ref 7.5–11.1)
PMV BLD AUTO: 10.4 FL (ref 7.5–11.1)
PMV BLD AUTO: 10.6 FL (ref 7.5–11.1)
PMV BLD AUTO: 10.7 FL (ref 7.5–11.1)
PMV BLD AUTO: 10.8 FL (ref 7.5–11.1)
PMV BLD AUTO: 10.8 FL (ref 7.5–11.1)
PMV BLD AUTO: 11 FL (ref 7.5–11.1)
PMV BLD AUTO: 11 FL (ref 7.5–11.1)
PMV BLD AUTO: 11.1 FL (ref 7.5–11.1)
PMV BLD AUTO: 11.1 FL (ref 7.5–11.1)
PMV BLD AUTO: 11.2 FL (ref 7.5–11.1)
PMV BLD AUTO: 11.3 FL (ref 7.5–11.1)
PMV BLD AUTO: 11.3 FL (ref 7.5–11.1)
PMV BLD AUTO: 11.4 FL (ref 7.5–11.1)
PMV BLD AUTO: 11.4 FL (ref 7.5–11.1)
PMV BLD AUTO: 11.5 FL (ref 7.5–11.1)
PMV BLD AUTO: 11.5 FL (ref 7.5–11.1)
PO2 ARTERIAL: 119 MMHG (ref 75–100)
PO2 ARTERIAL: 122 MMHG (ref 75–100)
PO2 ARTERIAL: 136 MMHG (ref 75–100)
PO2 ARTERIAL: 151 MMHG (ref 75–100)
PO2 ARTERIAL: 54 MMHG (ref 75–100)
PO2 ARTERIAL: 56 MMHG (ref 75–100)
PO2 ARTERIAL: 59 MMHG (ref 75–100)
PO2 ARTERIAL: 65 MMHG (ref 75–100)
PO2 ARTERIAL: 67 MMHG (ref 75–100)
PO2 ARTERIAL: 69 MMHG (ref 75–100)
PO2 ARTERIAL: 71 MMHG (ref 75–100)
PO2 ARTERIAL: 71 MMHG (ref 75–100)
PO2 ARTERIAL: 73 MMHG (ref 75–100)
PO2 ARTERIAL: 79 MMHG (ref 75–100)
PO2 ARTERIAL: 80 MMHG (ref 75–100)
PO2 ARTERIAL: 82 MMHG (ref 75–100)
PO2 ARTERIAL: 83 MMHG (ref 75–100)
PO2 ARTERIAL: 87 MMHG (ref 75–100)
PO2 ARTERIAL: 91 MMHG (ref 75–100)
POLYCHROMASIA: ABNORMAL
POTASSIUM SERPL-SCNC: 3.5 MMOL/L (ref 3.5–5.1)
POTASSIUM SERPL-SCNC: 3.6 MMOL/L (ref 3.5–5.1)
POTASSIUM SERPL-SCNC: 3.7 MMOL/L (ref 3.5–5.1)
POTASSIUM SERPL-SCNC: 3.7 MMOL/L (ref 3.5–5.1)
POTASSIUM SERPL-SCNC: 3.8 MMOL/L (ref 3.5–5.1)
POTASSIUM SERPL-SCNC: 3.9 MMOL/L (ref 3.5–5.1)
POTASSIUM SERPL-SCNC: 4 MMOL/L (ref 3.5–5.1)
POTASSIUM SERPL-SCNC: 4 MMOL/L (ref 3.5–5.1)
POTASSIUM SERPL-SCNC: 4.1 MMOL/L (ref 3.5–5.1)
POTASSIUM SERPL-SCNC: 4.2 MMOL/L (ref 3.5–5.1)
POTASSIUM SERPL-SCNC: 4.4 MMOL/L (ref 3.5–5.1)
POTASSIUM SERPL-SCNC: 4.5 MMOL/L (ref 3.5–5.1)
POTASSIUM SERPL-SCNC: 4.6 MMOL/L (ref 3.5–5.1)
POTASSIUM SERPL-SCNC: 4.7 MMOL/L (ref 3.5–5.1)
POTASSIUM SERPL-SCNC: 4.8 MMOL/L (ref 3.5–5.1)
POTASSIUM SERPL-SCNC: 4.8 MMOL/L (ref 3.5–5.1)
POTASSIUM SERPL-SCNC: 5.1 MMOL/L (ref 3.5–5.1)
POTASSIUM SERPL-SCNC: 5.1 MMOL/L (ref 3.5–5.1)
POTASSIUM SERPL-SCNC: 5.2 MMOL/L (ref 3.5–5.1)
POTASSIUM SERPL-SCNC: 5.4 MMOL/L (ref 3.5–5.1)
POTASSIUM, UR: 18.7 MMOL/L (ref 22–119)
PRO-BNP: 84.3 PG/ML
PROCALCITONIN: 0.06
PROCALCITONIN: 0.09
PROCALCITONIN: 0.18
PROCALCITONIN: 0.19
PROCALCITONIN: 0.22
PROCALCITONIN: 0.23
PROCALCITONIN: 0.28
PROCALCITONIN: 0.53
PROMYELOCYTES ABSOLUTE COUNT: 0.08 K/CU MM
PROMYELOCYTES PERCENT: 1 %
PROT/CREAT RATIO, UR: 1.3
PROTEIN UA: 30 MG/DL
PROTHROMBIN TIME: 13.5 SECONDS (ref 11.7–14.5)
RBC # BLD: 2.8 M/CU MM (ref 4.6–6.2)
RBC # BLD: 2.94 M/CU MM (ref 4.6–6.2)
RBC # BLD: 2.97 M/CU MM (ref 4.6–6.2)
RBC # BLD: 2.99 M/CU MM (ref 4.6–6.2)
RBC # BLD: 3.02 M/CU MM (ref 4.6–6.2)
RBC # BLD: 3.06 M/CU MM (ref 4.6–6.2)
RBC # BLD: 3.1 M/CU MM (ref 4.6–6.2)
RBC # BLD: 3.12 M/CU MM (ref 4.6–6.2)
RBC # BLD: 3.13 M/CU MM (ref 4.6–6.2)
RBC # BLD: 3.2 M/CU MM (ref 4.6–6.2)
RBC # BLD: 3.4 M/CU MM (ref 4.6–6.2)
RBC # BLD: 3.52 M/CU MM (ref 4.6–6.2)
RBC # BLD: 3.72 M/CU MM (ref 4.6–6.2)
RBC # BLD: 3.74 M/CU MM (ref 4.6–6.2)
RBC # BLD: 4.31 M/CU MM (ref 4.6–6.2)
RBC # BLD: 4.54 M/CU MM (ref 4.6–6.2)
RBC # BLD: 4.66 M/CU MM (ref 4.6–6.2)
RBC # BLD: 4.7 M/CU MM (ref 4.6–6.2)
RBC # BLD: 4.77 M/CU MM (ref 4.6–6.2)
RBC # BLD: 4.8 M/CU MM (ref 4.6–6.2)
RBC # BLD: 4.82 M/CU MM (ref 4.6–6.2)
RBC # BLD: 4.86 M/CU MM (ref 4.6–6.2)
RBC # BLD: 4.87 M/CU MM (ref 4.6–6.2)
RBC # BLD: 4.97 M/CU MM (ref 4.6–6.2)
RBC # BLD: 5.05 M/CU MM (ref 4.6–6.2)
RBC # BLD: 5.1 M/CU MM (ref 4.6–6.2)
RBC # BLD: 5.17 M/CU MM (ref 4.6–6.2)
RBC URINE: 157 /HPF (ref 0–3)
RHINOVIRUS ENTEROVIRUS PCR: NOT DETECTED
RSV PCR: NOT DETECTED
SARS-COV-2: ABNORMAL
SEGMENTED NEUTROPHILS ABSOLUTE COUNT: 10 K/CU MM
SEGMENTED NEUTROPHILS ABSOLUTE COUNT: 10.6 K/CU MM
SEGMENTED NEUTROPHILS ABSOLUTE COUNT: 11.4 K/CU MM
SEGMENTED NEUTROPHILS ABSOLUTE COUNT: 12 K/CU MM
SEGMENTED NEUTROPHILS ABSOLUTE COUNT: 12.1 K/CU MM
SEGMENTED NEUTROPHILS ABSOLUTE COUNT: 13.8 K/CU MM
SEGMENTED NEUTROPHILS ABSOLUTE COUNT: 14.3 K/CU MM
SEGMENTED NEUTROPHILS ABSOLUTE COUNT: 15.3 K/CU MM
SEGMENTED NEUTROPHILS ABSOLUTE COUNT: 15.5 K/CU MM
SEGMENTED NEUTROPHILS ABSOLUTE COUNT: 16.6 K/CU MM
SEGMENTED NEUTROPHILS ABSOLUTE COUNT: 16.7 K/CU MM
SEGMENTED NEUTROPHILS ABSOLUTE COUNT: 16.8 K/CU MM
SEGMENTED NEUTROPHILS ABSOLUTE COUNT: 17.3 K/CU MM
SEGMENTED NEUTROPHILS ABSOLUTE COUNT: 18.4 K/CU MM
SEGMENTED NEUTROPHILS ABSOLUTE COUNT: 19.6 K/CU MM
SEGMENTED NEUTROPHILS ABSOLUTE COUNT: 21.5 K/CU MM
SEGMENTED NEUTROPHILS ABSOLUTE COUNT: 22.9 K/CU MM
SEGMENTED NEUTROPHILS ABSOLUTE COUNT: 4.9 K/CU MM
SEGMENTED NEUTROPHILS ABSOLUTE COUNT: 5.5 K/CU MM
SEGMENTED NEUTROPHILS ABSOLUTE COUNT: 5.7 K/CU MM
SEGMENTED NEUTROPHILS ABSOLUTE COUNT: 5.8 K/CU MM
SEGMENTED NEUTROPHILS ABSOLUTE COUNT: 6.7 K/CU MM
SEGMENTED NEUTROPHILS ABSOLUTE COUNT: 7.4 K/CU MM
SEGMENTED NEUTROPHILS ABSOLUTE COUNT: 7.5 K/CU MM
SEGMENTED NEUTROPHILS ABSOLUTE COUNT: 8.1 K/CU MM
SEGMENTED NEUTROPHILS ABSOLUTE COUNT: 8.5 K/CU MM
SEGMENTED NEUTROPHILS ABSOLUTE COUNT: 8.6 K/CU MM
SEGMENTED NEUTROPHILS RELATIVE PERCENT: 63 % (ref 36–66)
SEGMENTED NEUTROPHILS RELATIVE PERCENT: 71 % (ref 36–66)
SEGMENTED NEUTROPHILS RELATIVE PERCENT: 75 % (ref 36–66)
SEGMENTED NEUTROPHILS RELATIVE PERCENT: 79.4 % (ref 36–66)
SEGMENTED NEUTROPHILS RELATIVE PERCENT: 82 % (ref 36–66)
SEGMENTED NEUTROPHILS RELATIVE PERCENT: 83 % (ref 36–66)
SEGMENTED NEUTROPHILS RELATIVE PERCENT: 84 % (ref 36–66)
SEGMENTED NEUTROPHILS RELATIVE PERCENT: 85 % (ref 36–66)
SEGMENTED NEUTROPHILS RELATIVE PERCENT: 85.4 % (ref 36–66)
SEGMENTED NEUTROPHILS RELATIVE PERCENT: 85.7 % (ref 36–66)
SEGMENTED NEUTROPHILS RELATIVE PERCENT: 86 % (ref 36–66)
SEGMENTED NEUTROPHILS RELATIVE PERCENT: 86.3 % (ref 36–66)
SEGMENTED NEUTROPHILS RELATIVE PERCENT: 86.6 % (ref 36–66)
SEGMENTED NEUTROPHILS RELATIVE PERCENT: 88.1 % (ref 36–66)
SEGMENTED NEUTROPHILS RELATIVE PERCENT: 88.3 % (ref 36–66)
SEGMENTED NEUTROPHILS RELATIVE PERCENT: 88.5 % (ref 36–66)
SEGMENTED NEUTROPHILS RELATIVE PERCENT: 88.7 % (ref 36–66)
SEGMENTED NEUTROPHILS RELATIVE PERCENT: 88.9 % (ref 36–66)
SEGMENTED NEUTROPHILS RELATIVE PERCENT: 89 % (ref 36–66)
SEGMENTED NEUTROPHILS RELATIVE PERCENT: 89.3 % (ref 36–66)
SEGMENTED NEUTROPHILS RELATIVE PERCENT: 90.3 % (ref 36–66)
SEGMENTED NEUTROPHILS RELATIVE PERCENT: 90.5 % (ref 36–66)
SEGMENTED NEUTROPHILS RELATIVE PERCENT: 90.5 % (ref 36–66)
SEGMENTED NEUTROPHILS RELATIVE PERCENT: 90.7 % (ref 36–66)
SEGMENTED NEUTROPHILS RELATIVE PERCENT: 90.8 % (ref 36–66)
SEGMENTED NEUTROPHILS RELATIVE PERCENT: 91 % (ref 36–66)
SEGMENTED NEUTROPHILS RELATIVE PERCENT: 93.7 % (ref 36–66)
SODIUM BLD-SCNC: 132 MMOL/L (ref 135–145)
SODIUM BLD-SCNC: 135 MMOL/L (ref 135–145)
SODIUM BLD-SCNC: 136 MMOL/L (ref 135–145)
SODIUM BLD-SCNC: 137 MMOL/L (ref 135–145)
SODIUM BLD-SCNC: 138 MMOL/L (ref 135–145)
SODIUM BLD-SCNC: 139 MMOL/L (ref 135–145)
SODIUM BLD-SCNC: 140 MMOL/L (ref 135–145)
SODIUM BLD-SCNC: 141 MMOL/L (ref 135–145)
SODIUM BLD-SCNC: 142 MMOL/L (ref 135–145)
SODIUM BLD-SCNC: 143 MMOL/L (ref 135–145)
SODIUM BLD-SCNC: 145 MMOL/L (ref 135–145)
SODIUM BLD-SCNC: 145 MMOL/L (ref 135–145)
SODIUM BLD-SCNC: 147 MMOL/L (ref 135–145)
SODIUM BLD-SCNC: 149 MMOL/L (ref 135–145)
SODIUM BLD-SCNC: 149 MMOL/L (ref 135–145)
SODIUM BLD-SCNC: 152 MMOL/L (ref 135–145)
SODIUM BLD-SCNC: 152 MMOL/L (ref 135–145)
SODIUM URINE: 41 MMOL/L (ref 35–167)
SPECIFIC GRAVITY UA: 1.02 (ref 1–1.03)
SPECIMEN: ABNORMAL
SPECIMEN: NORMAL
SQUAMOUS EPITHELIAL: <1 /HPF
STOMATOCYTES: ABNORMAL
STREP PNEUMONIAE ANTIGEN: NORMAL
STRONGYLOIDES ANTIBODY: 0.1 IV
TOTAL IMMATURE NEUTOROPHIL: 0.05 K/CU MM
TOTAL IMMATURE NEUTOROPHIL: 0.15 K/CU MM
TOTAL IMMATURE NEUTOROPHIL: 0.15 K/CU MM
TOTAL IMMATURE NEUTOROPHIL: 0.16 K/CU MM
TOTAL IMMATURE NEUTOROPHIL: 0.17 K/CU MM
TOTAL IMMATURE NEUTOROPHIL: 0.17 K/CU MM
TOTAL IMMATURE NEUTOROPHIL: 0.18 K/CU MM
TOTAL IMMATURE NEUTOROPHIL: 0.18 K/CU MM
TOTAL IMMATURE NEUTOROPHIL: 0.2 K/CU MM
TOTAL IMMATURE NEUTOROPHIL: 0.21 K/CU MM
TOTAL IMMATURE NEUTOROPHIL: 0.24 K/CU MM
TOTAL IMMATURE NEUTOROPHIL: 0.25 K/CU MM
TOTAL IMMATURE NEUTOROPHIL: 0.27 K/CU MM
TOTAL IMMATURE NEUTOROPHIL: 0.32 K/CU MM
TOTAL IMMATURE NEUTOROPHIL: 0.36 K/CU MM
TOTAL IMMATURE NEUTOROPHIL: 0.41 K/CU MM
TOTAL IMMATURE NEUTOROPHIL: 0.44 K/CU MM
TOTAL IMMATURE NEUTOROPHIL: 0.59 K/CU MM
TOTAL IRON BINDING CAPACITY: 144 UG/DL (ref 250–450)
TOTAL NUCLEATED RBC: 0 K/CU MM
TOTAL PROTEIN: 4.7 GM/DL (ref 6.4–8.2)
TOTAL PROTEIN: 4.7 GM/DL (ref 6.4–8.2)
TOTAL PROTEIN: 4.8 GM/DL (ref 6.4–8.2)
TOTAL PROTEIN: 5 GM/DL (ref 6.4–8.2)
TOTAL PROTEIN: 5 GM/DL (ref 6.4–8.2)
TOTAL PROTEIN: 5.1 GM/DL (ref 6.4–8.2)
TOTAL PROTEIN: 5.1 GM/DL (ref 6.4–8.2)
TOTAL PROTEIN: 5.4 GM/DL (ref 6.4–8.2)
TOTAL PROTEIN: 5.4 GM/DL (ref 6.4–8.2)
TOTAL PROTEIN: 5.7 GM/DL (ref 6.4–8.2)
TOTAL PROTEIN: 5.8 GM/DL (ref 6.4–8.2)
TOTAL PROTEIN: 5.9 GM/DL (ref 6.4–8.2)
TOTAL PROTEIN: 5.9 GM/DL (ref 6.4–8.2)
TOTAL PROTEIN: 6.1 GM/DL (ref 6.4–8.2)
TOTAL PROTEIN: 6.2 GM/DL (ref 6.4–8.2)
TOTAL PROTEIN: 6.2 GM/DL (ref 6.4–8.2)
TOTAL PROTEIN: 6.4 GM/DL (ref 6.4–8.2)
TOTAL PROTEIN: 6.5 GM/DL (ref 6.4–8.2)
TOTAL PROTEIN: 6.6 GM/DL (ref 6.4–8.2)
TOXIC GRANULATION: PRESENT
TRICHOMONAS: ABNORMAL /HPF
TRIGL SERPL-MCNC: 242 MG/DL
TRIGL SERPL-MCNC: 293 MG/DL
TROPONIN T: <0.01 NG/ML
TROPONIN T: <0.01 NG/ML
UNSATURATED IRON BINDING CAPACITY: 76 UG/DL (ref 110–370)
URINE TOTAL PROTEIN: 96.5 MG/DL
UROBILINOGEN, URINE: NEGATIVE MG/DL (ref 0.2–1)
VANCOMYCIN RANDOM: 22.8 UG/ML
VANCOMYCIN RANDOM: 29.1 UG/ML
VANCOMYCIN TROUGH: 7.8 UG/ML (ref 10–20)
VANCOMYCIN TROUGH: 9.6 UG/ML (ref 10–20)
VITAMIN B-12: 1636 PG/ML (ref 211–911)
VITAMIN D 25-HYDROXY: 41.98 NG/ML
WBC # BLD: 11 K/CU MM (ref 4–10.5)
WBC # BLD: 11.7 K/CU MM (ref 4–10.5)
WBC # BLD: 13.5 K/CU MM (ref 4–10.5)
WBC # BLD: 13.6 K/CU MM (ref 4–10.5)
WBC # BLD: 14.7 K/CU MM (ref 4–10.5)
WBC # BLD: 16.2 K/CU MM (ref 4–10.5)
WBC # BLD: 16.3 K/CU MM (ref 4–10.5)
WBC # BLD: 16.9 K/CU MM (ref 4–10.5)
WBC # BLD: 17 K/CU MM (ref 4–10.5)
WBC # BLD: 17.8 K/CU MM (ref 4–10.5)
WBC # BLD: 18.7 K/CU MM (ref 4–10.5)
WBC # BLD: 18.8 K/CU MM (ref 4–10.5)
WBC # BLD: 20.6 K/CU MM (ref 4–10.5)
WBC # BLD: 20.8 K/CU MM (ref 4–10.5)
WBC # BLD: 22.2 K/CU MM (ref 4–10.5)
WBC # BLD: 23.7 K/CU MM (ref 4–10.5)
WBC # BLD: 25.2 K/CU MM (ref 4–10.5)
WBC # BLD: 6.9 K/CU MM (ref 4–10.5)
WBC # BLD: 7 K/CU MM (ref 4–10.5)
WBC # BLD: 7.7 K/CU MM (ref 4–10.5)
WBC # BLD: 7.8 K/CU MM (ref 4–10.5)
WBC # BLD: 8.6 K/CU MM (ref 4–10.5)
WBC # BLD: 8.8 K/CU MM (ref 4–10.5)
WBC # BLD: 9.2 K/CU MM (ref 4–10.5)
WBC # BLD: 9.4 K/CU MM (ref 4–10.5)
WBC # BLD: 9.8 K/CU MM (ref 4–10.5)
WBC # BLD: 9.9 K/CU MM (ref 4–10.5)
WBC CLUMP: ABNORMAL /HPF
WBC UA: 3 /HPF (ref 0–2)

## 2021-01-01 PROCEDURE — 6370000000 HC RX 637 (ALT 250 FOR IP): Performed by: INTERNAL MEDICINE

## 2021-01-01 PROCEDURE — 6360000002 HC RX W HCPCS: Performed by: INTERNAL MEDICINE

## 2021-01-01 PROCEDURE — 2500000003 HC RX 250 WO HCPCS: Performed by: INTERNAL MEDICINE

## 2021-01-01 PROCEDURE — 80202 ASSAY OF VANCOMYCIN: CPT

## 2021-01-01 PROCEDURE — 36415 COLL VENOUS BLD VENIPUNCTURE: CPT

## 2021-01-01 PROCEDURE — 2580000003 HC RX 258: Performed by: INTERNAL MEDICINE

## 2021-01-01 PROCEDURE — 6360000002 HC RX W HCPCS: Performed by: STUDENT IN AN ORGANIZED HEALTH CARE EDUCATION/TRAINING PROGRAM

## 2021-01-01 PROCEDURE — C9113 INJ PANTOPRAZOLE SODIUM, VIA: HCPCS | Performed by: STUDENT IN AN ORGANIZED HEALTH CARE EDUCATION/TRAINING PROGRAM

## 2021-01-01 PROCEDURE — 71045 X-RAY EXAM CHEST 1 VIEW: CPT

## 2021-01-01 PROCEDURE — 89220 SPUTUM SPECIMEN COLLECTION: CPT

## 2021-01-01 PROCEDURE — 94003 VENT MGMT INPAT SUBQ DAY: CPT

## 2021-01-01 PROCEDURE — 6360000002 HC RX W HCPCS: Performed by: NURSE PRACTITIONER

## 2021-01-01 PROCEDURE — 94761 N-INVAS EAR/PLS OXIMETRY MLT: CPT

## 2021-01-01 PROCEDURE — 36600 WITHDRAWAL OF ARTERIAL BLOOD: CPT

## 2021-01-01 PROCEDURE — 86141 C-REACTIVE PROTEIN HS: CPT

## 2021-01-01 PROCEDURE — 94640 AIRWAY INHALATION TREATMENT: CPT

## 2021-01-01 PROCEDURE — 2700000000 HC OXYGEN THERAPY PER DAY

## 2021-01-01 PROCEDURE — 80053 COMPREHEN METABOLIC PANEL: CPT

## 2021-01-01 PROCEDURE — 96374 THER/PROPH/DIAG INJ IV PUSH: CPT

## 2021-01-01 PROCEDURE — 85007 BL SMEAR W/DIFF WBC COUNT: CPT

## 2021-01-01 PROCEDURE — 82607 VITAMIN B-12: CPT

## 2021-01-01 PROCEDURE — 37799 UNLISTED PX VASCULAR SURGERY: CPT

## 2021-01-01 PROCEDURE — 82803 BLOOD GASES ANY COMBINATION: CPT

## 2021-01-01 PROCEDURE — 99232 SBSQ HOSP IP/OBS MODERATE 35: CPT | Performed by: INTERNAL MEDICINE

## 2021-01-01 PROCEDURE — XW0DXM6 INTRODUCTION OF BARICITINIB INTO MOUTH AND PHARYNX, EXTERNAL APPROACH, NEW TECHNOLOGY GROUP 6: ICD-10-PCS | Performed by: STUDENT IN AN ORGANIZED HEALTH CARE EDUCATION/TRAINING PROGRAM

## 2021-01-01 PROCEDURE — 83735 ASSAY OF MAGNESIUM: CPT

## 2021-01-01 PROCEDURE — 84145 PROCALCITONIN (PCT): CPT

## 2021-01-01 PROCEDURE — 84484 ASSAY OF TROPONIN QUANT: CPT

## 2021-01-01 PROCEDURE — 2000000000 HC ICU R&B

## 2021-01-01 PROCEDURE — 74019 RADEX ABDOMEN 2 VIEWS: CPT

## 2021-01-01 PROCEDURE — 85025 COMPLETE CBC W/AUTO DIFF WBC: CPT

## 2021-01-01 PROCEDURE — 6370000000 HC RX 637 (ALT 250 FOR IP): Performed by: PHYSICIAN ASSISTANT

## 2021-01-01 PROCEDURE — 99233 SBSQ HOSP IP/OBS HIGH 50: CPT | Performed by: INTERNAL MEDICINE

## 2021-01-01 PROCEDURE — 5A1955Z RESPIRATORY VENTILATION, GREATER THAN 96 CONSECUTIVE HOURS: ICD-10-PCS | Performed by: INTERNAL MEDICINE

## 2021-01-01 PROCEDURE — 2580000003 HC RX 258: Performed by: NURSE PRACTITIONER

## 2021-01-01 PROCEDURE — 2060000000 HC ICU INTERMEDIATE R&B

## 2021-01-01 PROCEDURE — 87071 CULTURE AEROBIC QUANT OTHER: CPT

## 2021-01-01 PROCEDURE — 36620 INSERTION CATHETER ARTERY: CPT | Performed by: NURSE ANESTHETIST, CERTIFIED REGISTERED

## 2021-01-01 PROCEDURE — 85027 COMPLETE CBC AUTOMATED: CPT

## 2021-01-01 PROCEDURE — 99284 EMERGENCY DEPT VISIT MOD MDM: CPT

## 2021-01-01 PROCEDURE — 80069 RENAL FUNCTION PANEL: CPT

## 2021-01-01 PROCEDURE — 6370000000 HC RX 637 (ALT 250 FOR IP): Performed by: EMERGENCY MEDICINE

## 2021-01-01 PROCEDURE — 85730 THROMBOPLASTIN TIME PARTIAL: CPT

## 2021-01-01 PROCEDURE — 83880 ASSAY OF NATRIURETIC PEPTIDE: CPT

## 2021-01-01 PROCEDURE — 87449 NOS EACH ORGANISM AG IA: CPT

## 2021-01-01 PROCEDURE — 85379 FIBRIN DEGRADATION QUANT: CPT

## 2021-01-01 PROCEDURE — 94660 CPAP INITIATION&MGMT: CPT

## 2021-01-01 PROCEDURE — 2500000003 HC RX 250 WO HCPCS: Performed by: PHYSICIAN ASSISTANT

## 2021-01-01 PROCEDURE — 6370000000 HC RX 637 (ALT 250 FOR IP): Performed by: STUDENT IN AN ORGANIZED HEALTH CARE EDUCATION/TRAINING PROGRAM

## 2021-01-01 PROCEDURE — 82728 ASSAY OF FERRITIN: CPT

## 2021-01-01 PROCEDURE — 99211 OFF/OP EST MAY X REQ PHY/QHP: CPT

## 2021-01-01 PROCEDURE — 82306 VITAMIN D 25 HYDROXY: CPT

## 2021-01-01 PROCEDURE — 93010 ELECTROCARDIOGRAM REPORT: CPT | Performed by: INTERNAL MEDICINE

## 2021-01-01 PROCEDURE — 6360000002 HC RX W HCPCS: Performed by: EMERGENCY MEDICINE

## 2021-01-01 PROCEDURE — 72192 CT PELVIS W/O DYE: CPT

## 2021-01-01 PROCEDURE — 84100 ASSAY OF PHOSPHORUS: CPT

## 2021-01-01 PROCEDURE — 87040 BLOOD CULTURE FOR BACTERIA: CPT

## 2021-01-01 PROCEDURE — P9047 ALBUMIN (HUMAN), 25%, 50ML: HCPCS | Performed by: INTERNAL MEDICINE

## 2021-01-01 PROCEDURE — 85610 PROTHROMBIN TIME: CPT

## 2021-01-01 PROCEDURE — P9047 ALBUMIN (HUMAN), 25%, 50ML: HCPCS | Performed by: NURSE PRACTITIONER

## 2021-01-01 PROCEDURE — 82570 ASSAY OF URINE CREATININE: CPT

## 2021-01-01 PROCEDURE — 36556 INSERT NON-TUNNEL CV CATH: CPT

## 2021-01-01 PROCEDURE — 2500000003 HC RX 250 WO HCPCS: Performed by: NURSE PRACTITIONER

## 2021-01-01 PROCEDURE — 74018 RADEX ABDOMEN 1 VIEW: CPT

## 2021-01-01 PROCEDURE — 0202U NFCT DS 22 TRGT SARS-COV-2: CPT

## 2021-01-01 PROCEDURE — 87077 CULTURE AEROBIC IDENTIFY: CPT

## 2021-01-01 PROCEDURE — 99222 1ST HOSP IP/OBS MODERATE 55: CPT | Performed by: INTERNAL MEDICINE

## 2021-01-01 PROCEDURE — C1769 GUIDE WIRE: HCPCS

## 2021-01-01 PROCEDURE — 99223 1ST HOSP IP/OBS HIGH 75: CPT | Performed by: INTERNAL MEDICINE

## 2021-01-01 PROCEDURE — C1894 INTRO/SHEATH, NON-LASER: HCPCS

## 2021-01-01 PROCEDURE — 31500 INSERT EMERGENCY AIRWAY: CPT

## 2021-01-01 PROCEDURE — 2580000003 HC RX 258

## 2021-01-01 PROCEDURE — 51798 US URINE CAPACITY MEASURE: CPT

## 2021-01-01 PROCEDURE — 99291 CRITICAL CARE FIRST HOUR: CPT | Performed by: INTERNAL MEDICINE

## 2021-01-01 PROCEDURE — 93005 ELECTROCARDIOGRAM TRACING: CPT | Performed by: INTERNAL MEDICINE

## 2021-01-01 PROCEDURE — 93005 ELECTROCARDIOGRAM TRACING: CPT | Performed by: NURSE PRACTITIONER

## 2021-01-01 PROCEDURE — 0BH17EZ INSERTION OF ENDOTRACHEAL AIRWAY INTO TRACHEA, VIA NATURAL OR ARTIFICIAL OPENING: ICD-10-PCS | Performed by: INTERNAL MEDICINE

## 2021-01-01 PROCEDURE — 83615 LACTATE (LD) (LDH) ENZYME: CPT

## 2021-01-01 PROCEDURE — 99221 1ST HOSP IP/OBS SF/LOW 40: CPT | Performed by: SURGERY

## 2021-01-01 PROCEDURE — 76937 US GUIDE VASCULAR ACCESS: CPT

## 2021-01-01 PROCEDURE — 36592 COLLECT BLOOD FROM PICC: CPT

## 2021-01-01 PROCEDURE — 87086 URINE CULTURE/COLONY COUNT: CPT

## 2021-01-01 PROCEDURE — 02HV33Z INSERTION OF INFUSION DEVICE INTO SUPERIOR VENA CAVA, PERCUTANEOUS APPROACH: ICD-10-PCS | Performed by: RADIOLOGY

## 2021-01-01 PROCEDURE — 83540 ASSAY OF IRON: CPT

## 2021-01-01 PROCEDURE — 82746 ASSAY OF FOLIC ACID SERUM: CPT

## 2021-01-01 PROCEDURE — 87899 AGENT NOS ASSAY W/OPTIC: CPT

## 2021-01-01 PROCEDURE — 83605 ASSAY OF LACTIC ACID: CPT

## 2021-01-01 PROCEDURE — 84300 ASSAY OF URINE SODIUM: CPT

## 2021-01-01 PROCEDURE — 87070 CULTURE OTHR SPECIMN AEROBIC: CPT

## 2021-01-01 PROCEDURE — 82436 ASSAY OF URINE CHLORIDE: CPT

## 2021-01-01 PROCEDURE — C1751 CATH, INF, PER/CENT/MIDLINE: HCPCS

## 2021-01-01 PROCEDURE — 36620 INSERTION CATHETER ARTERY: CPT

## 2021-01-01 PROCEDURE — 2580000003 HC RX 258: Performed by: EMERGENCY MEDICINE

## 2021-01-01 PROCEDURE — 81001 URINALYSIS AUTO W/SCOPE: CPT

## 2021-01-01 PROCEDURE — 2500000003 HC RX 250 WO HCPCS

## 2021-01-01 PROCEDURE — 87205 SMEAR GRAM STAIN: CPT

## 2021-01-01 PROCEDURE — 82962 GLUCOSE BLOOD TEST: CPT

## 2021-01-01 PROCEDURE — 93005 ELECTROCARDIOGRAM TRACING: CPT | Performed by: EMERGENCY MEDICINE

## 2021-01-01 PROCEDURE — 51702 INSERT TEMP BLADDER CATH: CPT

## 2021-01-01 PROCEDURE — 2580000003 HC RX 258: Performed by: STUDENT IN AN ORGANIZED HEALTH CARE EDUCATION/TRAINING PROGRAM

## 2021-01-01 PROCEDURE — 93306 TTE W/DOPPLER COMPLETE: CPT

## 2021-01-01 PROCEDURE — 84478 ASSAY OF TRIGLYCERIDES: CPT

## 2021-01-01 PROCEDURE — 6360000002 HC RX W HCPCS

## 2021-01-01 PROCEDURE — 85384 FIBRINOGEN ACTIVITY: CPT

## 2021-01-01 PROCEDURE — 2709999900 HC NON-CHARGEABLE SUPPLY

## 2021-01-01 PROCEDURE — 71275 CT ANGIOGRAPHY CHEST: CPT

## 2021-01-01 PROCEDURE — 93970 EXTREMITY STUDY: CPT

## 2021-01-01 PROCEDURE — 83550 IRON BINDING TEST: CPT

## 2021-01-01 PROCEDURE — 87305 ASPERGILLUS AG IA: CPT

## 2021-01-01 PROCEDURE — 94002 VENT MGMT INPAT INIT DAY: CPT

## 2021-01-01 PROCEDURE — 86682 HELMINTH ANTIBODY: CPT

## 2021-01-01 PROCEDURE — 85652 RBC SED RATE AUTOMATED: CPT

## 2021-01-01 PROCEDURE — 02HV33Z INSERTION OF INFUSION DEVICE INTO SUPERIOR VENA CAVA, PERCUTANEOUS APPROACH: ICD-10-PCS | Performed by: INTERNAL MEDICINE

## 2021-01-01 PROCEDURE — 84156 ASSAY OF PROTEIN URINE: CPT

## 2021-01-01 PROCEDURE — 6360000004 HC RX CONTRAST MEDICATION: Performed by: EMERGENCY MEDICINE

## 2021-01-01 PROCEDURE — 84133 ASSAY OF URINE POTASSIUM: CPT

## 2021-01-01 PROCEDURE — 87081 CULTURE SCREEN ONLY: CPT

## 2021-01-01 PROCEDURE — 82784 ASSAY IGA/IGD/IGG/IGM EACH: CPT

## 2021-01-01 RX ORDER — SODIUM CHLORIDE 0.9 % (FLUSH) 0.9 %
5-40 SYRINGE (ML) INJECTION EVERY 12 HOURS SCHEDULED
Status: DISCONTINUED | OUTPATIENT
Start: 2021-01-01 | End: 2021-01-01 | Stop reason: HOSPADM

## 2021-01-01 RX ORDER — LORAZEPAM 1 MG/1
1 TABLET ORAL ONCE
Status: DISCONTINUED | OUTPATIENT
Start: 2021-01-01 | End: 2021-01-01

## 2021-01-01 RX ORDER — METOPROLOL TARTRATE 5 MG/5ML
5 INJECTION INTRAVENOUS ONCE
Status: DISCONTINUED | OUTPATIENT
Start: 2021-01-01 | End: 2021-01-01

## 2021-01-01 RX ORDER — ALBUTEROL SULFATE 90 UG/1
4 AEROSOL, METERED RESPIRATORY (INHALATION) EVERY 4 HOURS
Status: DISCONTINUED | OUTPATIENT
Start: 2021-01-01 | End: 2021-01-01

## 2021-01-01 RX ORDER — DILTIAZEM HYDROCHLORIDE 5 MG/ML
10 INJECTION INTRAVENOUS ONCE
Status: COMPLETED | OUTPATIENT
Start: 2021-01-01 | End: 2021-01-01

## 2021-01-01 RX ORDER — MAGNESIUM SULFATE 1 G/100ML
1000 INJECTION INTRAVENOUS PRN
Status: DISCONTINUED | OUTPATIENT
Start: 2021-01-01 | End: 2021-01-01 | Stop reason: HOSPADM

## 2021-01-01 RX ORDER — FUROSEMIDE 10 MG/ML
20 INJECTION INTRAMUSCULAR; INTRAVENOUS ONCE
Status: COMPLETED | OUTPATIENT
Start: 2021-01-01 | End: 2021-01-01

## 2021-01-01 RX ORDER — ATORVASTATIN CALCIUM 10 MG/1
10 TABLET, FILM COATED ORAL DAILY
Status: DISCONTINUED | OUTPATIENT
Start: 2021-01-01 | End: 2021-01-01

## 2021-01-01 RX ORDER — 0.9 % SODIUM CHLORIDE 0.9 %
500 INTRAVENOUS SOLUTION INTRAVENOUS ONCE
Status: COMPLETED | OUTPATIENT
Start: 2021-01-01 | End: 2021-01-01

## 2021-01-01 RX ORDER — KETOROLAC TROMETHAMINE 30 MG/ML
15 INJECTION, SOLUTION INTRAMUSCULAR; INTRAVENOUS ONCE
Status: COMPLETED | OUTPATIENT
Start: 2021-01-01 | End: 2021-01-01

## 2021-01-01 RX ORDER — POLYETHYLENE GLYCOL 3350 17 G/17G
17 POWDER, FOR SOLUTION ORAL DAILY PRN
Status: DISCONTINUED | OUTPATIENT
Start: 2021-01-01 | End: 2021-01-01 | Stop reason: HOSPADM

## 2021-01-01 RX ORDER — ACETAMINOPHEN 325 MG/1
650 TABLET ORAL EVERY 6 HOURS PRN
Status: DISCONTINUED | OUTPATIENT
Start: 2021-01-01 | End: 2021-01-01 | Stop reason: HOSPADM

## 2021-01-01 RX ORDER — LORAZEPAM 2 MG/ML
1 INJECTION INTRAMUSCULAR ONCE
Status: COMPLETED | OUTPATIENT
Start: 2021-01-01 | End: 2021-01-01

## 2021-01-01 RX ORDER — DEXAMETHASONE 4 MG/1
6 TABLET ORAL DAILY
Status: DISCONTINUED | OUTPATIENT
Start: 2021-01-01 | End: 2021-01-01

## 2021-01-01 RX ORDER — MORPHINE SULFATE 2 MG/ML
2 INJECTION, SOLUTION INTRAMUSCULAR; INTRAVENOUS
Status: DISCONTINUED | OUTPATIENT
Start: 2021-01-01 | End: 2021-01-01 | Stop reason: HOSPADM

## 2021-01-01 RX ORDER — VANCOMYCIN 1.5 G/300ML
1500 INJECTION, SOLUTION INTRAVENOUS
Status: DISCONTINUED | OUTPATIENT
Start: 2021-01-01 | End: 2021-01-01

## 2021-01-01 RX ORDER — ONDANSETRON 4 MG/1
4 TABLET, ORALLY DISINTEGRATING ORAL EVERY 8 HOURS PRN
Status: DISCONTINUED | OUTPATIENT
Start: 2021-01-01 | End: 2021-01-01 | Stop reason: HOSPADM

## 2021-01-01 RX ORDER — OXYMETAZOLINE HYDROCHLORIDE 0.05 G/100ML
2 SPRAY NASAL ONCE
Status: DISPENSED | OUTPATIENT
Start: 2021-01-01 | End: 2021-01-01

## 2021-01-01 RX ORDER — VANCOMYCIN 1.75 G/350ML
1250 INJECTION, SOLUTION INTRAVENOUS EVERY 12 HOURS
Status: DISCONTINUED | OUTPATIENT
Start: 2021-01-01 | End: 2021-01-01 | Stop reason: DRUGHIGH

## 2021-01-01 RX ORDER — BENZONATATE 100 MG/1
100 CAPSULE ORAL ONCE
Status: COMPLETED | OUTPATIENT
Start: 2021-01-01 | End: 2021-01-01

## 2021-01-01 RX ORDER — MIDAZOLAM HYDROCHLORIDE 2 MG/2ML
4 INJECTION, SOLUTION INTRAMUSCULAR; INTRAVENOUS ONCE
Status: COMPLETED | OUTPATIENT
Start: 2021-01-01 | End: 2021-01-01

## 2021-01-01 RX ORDER — SODIUM CHLORIDE 0.9 % (FLUSH) 0.9 %
5-40 SYRINGE (ML) INJECTION 2 TIMES DAILY
Status: DISCONTINUED | OUTPATIENT
Start: 2021-01-01 | End: 2021-01-01

## 2021-01-01 RX ORDER — ALBUMIN (HUMAN) 12.5 G/50ML
25 SOLUTION INTRAVENOUS ONCE
Status: COMPLETED | OUTPATIENT
Start: 2021-01-01 | End: 2021-01-01

## 2021-01-01 RX ORDER — TAMSULOSIN HYDROCHLORIDE 0.4 MG/1
0.4 CAPSULE ORAL DAILY
Status: DISCONTINUED | OUTPATIENT
Start: 2021-01-01 | End: 2021-01-01

## 2021-01-01 RX ORDER — LORAZEPAM 2 MG/ML
0.5 INJECTION INTRAMUSCULAR ONCE
Status: COMPLETED | OUTPATIENT
Start: 2021-01-01 | End: 2021-01-01

## 2021-01-01 RX ORDER — MINOCYCLINE HYDROCHLORIDE 100 MG/1
100 CAPSULE ORAL 2 TIMES DAILY
Status: DISCONTINUED | OUTPATIENT
Start: 2021-01-01 | End: 2021-01-01

## 2021-01-01 RX ORDER — SODIUM CHLORIDE 9 MG/ML
INJECTION, SOLUTION INTRAVENOUS CONTINUOUS
Status: DISCONTINUED | OUTPATIENT
Start: 2021-01-01 | End: 2021-01-01

## 2021-01-01 RX ORDER — EPINEPHRINE 0.1 MG/ML
SYRINGE (ML) INJECTION
Status: COMPLETED | OUTPATIENT
Start: 2021-01-01 | End: 2021-01-01

## 2021-01-01 RX ORDER — GLYCOPYRROLATE 1 MG/5 ML
0.2 SYRINGE (ML) INTRAVENOUS EVERY 4 HOURS PRN
Status: DISCONTINUED | OUTPATIENT
Start: 2021-01-01 | End: 2021-01-01 | Stop reason: HOSPADM

## 2021-01-01 RX ORDER — PROPOFOL 10 MG/ML
INJECTION, EMULSION INTRAVENOUS
Status: COMPLETED
Start: 2021-01-01 | End: 2021-01-01

## 2021-01-01 RX ORDER — ATROPINE SULFATE 0.1 MG/ML
INJECTION INTRAVENOUS
Status: DISCONTINUED
Start: 2021-01-01 | End: 2021-01-01 | Stop reason: WASHOUT

## 2021-01-01 RX ORDER — 0.9 % SODIUM CHLORIDE 0.9 %
1000 INTRAVENOUS SOLUTION INTRAVENOUS ONCE
Status: COMPLETED | OUTPATIENT
Start: 2021-01-01 | End: 2021-01-01

## 2021-01-01 RX ORDER — SODIUM CHLORIDE 9 MG/ML
25 INJECTION, SOLUTION INTRAVENOUS PRN
Status: DISCONTINUED | OUTPATIENT
Start: 2021-01-01 | End: 2021-01-01 | Stop reason: HOSPADM

## 2021-01-01 RX ORDER — DEXAMETHASONE SODIUM PHOSPHATE 10 MG/ML
6 INJECTION INTRAMUSCULAR; INTRAVENOUS DAILY
Status: DISCONTINUED | OUTPATIENT
Start: 2021-01-01 | End: 2021-01-01

## 2021-01-01 RX ORDER — METOCLOPRAMIDE HYDROCHLORIDE 5 MG/ML
10 INJECTION INTRAMUSCULAR; INTRAVENOUS EVERY 8 HOURS
Status: DISCONTINUED | OUTPATIENT
Start: 2021-01-01 | End: 2021-01-01 | Stop reason: HOSPADM

## 2021-01-01 RX ORDER — LIDOCAINE HYDROCHLORIDE 10 MG/ML
5 INJECTION, SOLUTION EPIDURAL; INFILTRATION; INTRACAUDAL; PERINEURAL ONCE
Status: COMPLETED | OUTPATIENT
Start: 2021-01-01 | End: 2021-01-01

## 2021-01-01 RX ORDER — KETOROLAC TROMETHAMINE 30 MG/ML
15 INJECTION, SOLUTION INTRAMUSCULAR; INTRAVENOUS EVERY 6 HOURS PRN
Status: ACTIVE | OUTPATIENT
Start: 2021-01-01 | End: 2021-01-01

## 2021-01-01 RX ORDER — FUROSEMIDE 10 MG/ML
60 INJECTION INTRAMUSCULAR; INTRAVENOUS ONCE
Status: COMPLETED | OUTPATIENT
Start: 2021-01-01 | End: 2021-01-01

## 2021-01-01 RX ORDER — ALBUTEROL SULFATE 90 UG/1
2 AEROSOL, METERED RESPIRATORY (INHALATION) EVERY 4 HOURS
Status: DISPENSED | OUTPATIENT
Start: 2021-01-01 | End: 2021-01-01

## 2021-01-01 RX ORDER — SODIUM CHLORIDE 0.9 % (FLUSH) 0.9 %
5-40 SYRINGE (ML) INJECTION PRN
Status: DISCONTINUED | OUTPATIENT
Start: 2021-01-01 | End: 2021-01-01 | Stop reason: HOSPADM

## 2021-01-01 RX ORDER — ATENOLOL 50 MG/1
50 TABLET ORAL 2 TIMES DAILY
Status: DISCONTINUED | OUTPATIENT
Start: 2021-01-01 | End: 2021-01-01

## 2021-01-01 RX ORDER — AMIODARONE HYDROCHLORIDE 50 MG/ML
INJECTION, SOLUTION INTRAVENOUS
Status: COMPLETED | OUTPATIENT
Start: 2021-01-01 | End: 2021-01-01

## 2021-01-01 RX ORDER — ALPRAZOLAM 0.25 MG/1
0.25 TABLET ORAL EVERY 8 HOURS PRN
Status: DISCONTINUED | OUTPATIENT
Start: 2021-01-01 | End: 2021-01-01 | Stop reason: HOSPADM

## 2021-01-01 RX ORDER — PROPOFOL 10 MG/ML
5-50 INJECTION, EMULSION INTRAVENOUS
Status: DISCONTINUED | OUTPATIENT
Start: 2021-01-01 | End: 2021-01-01

## 2021-01-01 RX ORDER — DIGOXIN 0.25 MG/ML
500 INJECTION INTRAMUSCULAR; INTRAVENOUS DAILY
Status: DISCONTINUED | OUTPATIENT
Start: 2021-01-01 | End: 2021-01-01

## 2021-01-01 RX ORDER — LIDOCAINE HYDROCHLORIDE 20 MG/ML
JELLY TOPICAL ONCE
Status: DISCONTINUED | OUTPATIENT
Start: 2021-01-01 | End: 2021-01-01 | Stop reason: HOSPADM

## 2021-01-01 RX ORDER — METOPROLOL TARTRATE 5 MG/5ML
5 INJECTION INTRAVENOUS ONCE
Status: COMPLETED | OUTPATIENT
Start: 2021-01-01 | End: 2021-01-01

## 2021-01-01 RX ORDER — ACETAMINOPHEN 650 MG/1
650 SUPPOSITORY RECTAL EVERY 6 HOURS PRN
Status: DISCONTINUED | OUTPATIENT
Start: 2021-01-01 | End: 2021-01-01 | Stop reason: HOSPADM

## 2021-01-01 RX ORDER — PANTOPRAZOLE SODIUM 40 MG/10ML
40 INJECTION, POWDER, LYOPHILIZED, FOR SOLUTION INTRAVENOUS DAILY
Status: DISCONTINUED | OUTPATIENT
Start: 2021-01-01 | End: 2021-01-01 | Stop reason: HOSPADM

## 2021-01-01 RX ORDER — DOPAMINE HYDROCHLORIDE 160 MG/100ML
2-20 INJECTION, SOLUTION INTRAVENOUS CONTINUOUS
Status: DISCONTINUED | OUTPATIENT
Start: 2021-01-01 | End: 2021-01-01

## 2021-01-01 RX ORDER — DEXAMETHASONE 4 MG/1
6 TABLET ORAL DAILY
Status: DISPENSED | OUTPATIENT
Start: 2021-01-01 | End: 2021-01-01

## 2021-01-01 RX ORDER — KETOROLAC TROMETHAMINE 30 MG/ML
INJECTION, SOLUTION INTRAMUSCULAR; INTRAVENOUS
Status: COMPLETED
Start: 2021-01-01 | End: 2021-01-01

## 2021-01-01 RX ORDER — SODIUM CHLORIDE 9 MG/ML
25 INJECTION, SOLUTION INTRAVENOUS PRN
Status: DISCONTINUED | OUTPATIENT
Start: 2021-01-01 | End: 2021-01-01

## 2021-01-01 RX ORDER — DIGOXIN 0.25 MG/ML
500 INJECTION INTRAMUSCULAR; INTRAVENOUS ONCE
Status: COMPLETED | OUTPATIENT
Start: 2021-01-01 | End: 2021-01-01

## 2021-01-01 RX ORDER — ONDANSETRON 2 MG/ML
4 INJECTION INTRAMUSCULAR; INTRAVENOUS EVERY 6 HOURS PRN
Status: DISCONTINUED | OUTPATIENT
Start: 2021-01-01 | End: 2021-01-01 | Stop reason: HOSPADM

## 2021-01-01 RX ORDER — DEXAMETHASONE SODIUM PHOSPHATE 10 MG/ML
6 INJECTION INTRAMUSCULAR; INTRAVENOUS EVERY 6 HOURS
Status: COMPLETED | OUTPATIENT
Start: 2021-01-01 | End: 2021-01-01

## 2021-01-01 RX ORDER — MORPHINE SULFATE 4 MG/ML
2 INJECTION, SOLUTION INTRAMUSCULAR; INTRAVENOUS ONCE
Status: COMPLETED | OUTPATIENT
Start: 2021-01-01 | End: 2021-01-01

## 2021-01-01 RX ORDER — METHYLPREDNISOLONE SODIUM SUCCINATE 125 MG/2ML
70 INJECTION, POWDER, LYOPHILIZED, FOR SOLUTION INTRAMUSCULAR; INTRAVENOUS DAILY
Status: DISCONTINUED | OUTPATIENT
Start: 2021-01-01 | End: 2021-01-01 | Stop reason: HOSPADM

## 2021-01-01 RX ORDER — LORAZEPAM 2 MG/ML
0.5 INJECTION INTRAMUSCULAR
Status: DISCONTINUED | OUTPATIENT
Start: 2021-01-01 | End: 2021-01-01 | Stop reason: HOSPADM

## 2021-01-01 RX ORDER — GUAIFENESIN 100 MG/5ML
200 SOLUTION ORAL ONCE
Status: COMPLETED | OUTPATIENT
Start: 2021-01-01 | End: 2021-01-01

## 2021-01-01 RX ORDER — FUROSEMIDE 10 MG/ML
40 INJECTION INTRAMUSCULAR; INTRAVENOUS ONCE
Status: COMPLETED | OUTPATIENT
Start: 2021-01-01 | End: 2021-01-01

## 2021-01-01 RX ORDER — LOSARTAN POTASSIUM 50 MG/1
100 TABLET ORAL DAILY
Status: DISCONTINUED | OUTPATIENT
Start: 2021-01-01 | End: 2021-01-01

## 2021-01-01 RX ADMIN — PROPOFOL 35 MCG/KG/MIN: 10 INJECTION, EMULSION INTRAVENOUS at 03:24

## 2021-01-01 RX ADMIN — METOCLOPRAMIDE HYDROCHLORIDE 10 MG: 5 INJECTION INTRAMUSCULAR; INTRAVENOUS at 12:35

## 2021-01-01 RX ADMIN — TAMSULOSIN HYDROCHLORIDE 0.4 MG: 0.4 CAPSULE ORAL at 08:51

## 2021-01-01 RX ADMIN — IMMUNE GLOBULIN (HUMAN) 40 G: 10 INJECTION INTRAVENOUS; SUBCUTANEOUS at 16:56

## 2021-01-01 RX ADMIN — SODIUM CHLORIDE, PRESERVATIVE FREE 10 ML: 5 INJECTION INTRAVENOUS at 08:38

## 2021-01-01 RX ADMIN — BARICITINIB 4 MG: 2 TABLET, FILM COATED ORAL at 08:29

## 2021-01-01 RX ADMIN — LOSARTAN POTASSIUM 100 MG: 50 TABLET, FILM COATED ORAL at 14:35

## 2021-01-01 RX ADMIN — BARICITINIB 4 MG: 2 TABLET, FILM COATED ORAL at 08:24

## 2021-01-01 RX ADMIN — ALBUTEROL SULFATE 4 PUFF: 90 AEROSOL, METERED RESPIRATORY (INHALATION) at 23:04

## 2021-01-01 RX ADMIN — ENOXAPARIN SODIUM 30 MG: 100 INJECTION SUBCUTANEOUS at 21:30

## 2021-01-01 RX ADMIN — CEFEPIME 2000 MG: 2 INJECTION, POWDER, FOR SOLUTION INTRAVENOUS at 18:23

## 2021-01-01 RX ADMIN — ENOXAPARIN SODIUM 30 MG: 100 INJECTION SUBCUTANEOUS at 20:25

## 2021-01-01 RX ADMIN — Medication 50 MCG/HR: at 10:58

## 2021-01-01 RX ADMIN — VASOPRESSIN 0.02 UNITS/MIN: 20 INJECTION INTRAVENOUS at 12:34

## 2021-01-01 RX ADMIN — BARICITINIB 4 MG: 2 TABLET, FILM COATED ORAL at 08:04

## 2021-01-01 RX ADMIN — BARICITINIB 2 MG: 2 TABLET, FILM COATED ORAL at 09:32

## 2021-01-01 RX ADMIN — ATENOLOL 50 MG: 50 TABLET ORAL at 20:19

## 2021-01-01 RX ADMIN — Medication 100 MCG/HR: at 21:37

## 2021-01-01 RX ADMIN — PANTOPRAZOLE SODIUM 40 MG: 40 INJECTION, POWDER, FOR SOLUTION INTRAVENOUS at 09:33

## 2021-01-01 RX ADMIN — ATENOLOL 50 MG: 50 TABLET ORAL at 20:36

## 2021-01-01 RX ADMIN — SODIUM CHLORIDE, PRESERVATIVE FREE 10 ML: 5 INJECTION INTRAVENOUS at 08:18

## 2021-01-01 RX ADMIN — PROPOFOL 70 MCG/KG/MIN: 10 INJECTION, EMULSION INTRAVENOUS at 14:20

## 2021-01-01 RX ADMIN — ATENOLOL 50 MG: 50 TABLET ORAL at 08:03

## 2021-01-01 RX ADMIN — MAGNESIUM SULFATE HEPTAHYDRATE 1000 MG: 1 INJECTION, SOLUTION INTRAVENOUS at 16:45

## 2021-01-01 RX ADMIN — METOCLOPRAMIDE HYDROCHLORIDE 10 MG: 5 INJECTION INTRAMUSCULAR; INTRAVENOUS at 05:40

## 2021-01-01 RX ADMIN — CEFEPIME 2000 MG: 2 INJECTION, POWDER, FOR SOLUTION INTRAVENOUS at 15:33

## 2021-01-01 RX ADMIN — SODIUM CHLORIDE, PRESERVATIVE FREE 10 ML: 5 INJECTION INTRAVENOUS at 09:11

## 2021-01-01 RX ADMIN — VANCOMYCIN 1250 MG: 1.75 INJECTION, SOLUTION INTRAVENOUS at 20:25

## 2021-01-01 RX ADMIN — Medication 150 MCG/HR: at 01:13

## 2021-01-01 RX ADMIN — CEFEPIME 2000 MG: 2 INJECTION, POWDER, FOR SOLUTION INTRAVENOUS at 07:09

## 2021-01-01 RX ADMIN — ENOXAPARIN SODIUM 30 MG: 100 INJECTION SUBCUTANEOUS at 21:03

## 2021-01-01 RX ADMIN — ATORVASTATIN CALCIUM 10 MG: 10 TABLET, FILM COATED ORAL at 08:37

## 2021-01-01 RX ADMIN — SODIUM CHLORIDE, PRESERVATIVE FREE 10 ML: 5 INJECTION INTRAVENOUS at 07:42

## 2021-01-01 RX ADMIN — ALBUTEROL SULFATE 4 PUFF: 90 AEROSOL, METERED RESPIRATORY (INHALATION) at 16:28

## 2021-01-01 RX ADMIN — PANTOPRAZOLE SODIUM 40 MG: 40 INJECTION, POWDER, FOR SOLUTION INTRAVENOUS at 07:59

## 2021-01-01 RX ADMIN — PROPOFOL 65 MCG/KG/MIN: 10 INJECTION, EMULSION INTRAVENOUS at 17:08

## 2021-01-01 RX ADMIN — ATORVASTATIN CALCIUM 10 MG: 10 TABLET, FILM COATED ORAL at 08:00

## 2021-01-01 RX ADMIN — SODIUM CHLORIDE: 9 INJECTION, SOLUTION INTRAVENOUS at 15:44

## 2021-01-01 RX ADMIN — VASOPRESSIN 0.04 UNITS/MIN: 20 INJECTION INTRAVENOUS at 17:51

## 2021-01-01 RX ADMIN — ENOXAPARIN SODIUM 30 MG: 100 INJECTION SUBCUTANEOUS at 08:04

## 2021-01-01 RX ADMIN — Medication 100 MCG/HR: at 08:22

## 2021-01-01 RX ADMIN — MORPHINE SULFATE 2 MG: 4 INJECTION, SOLUTION INTRAMUSCULAR; INTRAVENOUS at 13:52

## 2021-01-01 RX ADMIN — ENOXAPARIN SODIUM 30 MG: 100 INJECTION SUBCUTANEOUS at 20:20

## 2021-01-01 RX ADMIN — CEFEPIME 2000 MG: 2 INJECTION, POWDER, FOR SOLUTION INTRAVENOUS at 23:22

## 2021-01-01 RX ADMIN — VASOPRESSIN 0.04 UNITS/MIN: 20 INJECTION INTRAVENOUS at 11:45

## 2021-01-01 RX ADMIN — VASOPRESSIN 0.03 UNITS/MIN: 20 INJECTION INTRAVENOUS at 05:46

## 2021-01-01 RX ADMIN — ATORVASTATIN CALCIUM 10 MG: 10 TABLET, FILM COATED ORAL at 08:16

## 2021-01-01 RX ADMIN — LIDOCAINE HYDROCHLORIDE 5 ML: 10 INJECTION, SOLUTION EPIDURAL; INFILTRATION; INTRACAUDAL; PERINEURAL at 02:30

## 2021-01-01 RX ADMIN — PROPOFOL 65 MCG/KG/MIN: 10 INJECTION, EMULSION INTRAVENOUS at 21:11

## 2021-01-01 RX ADMIN — ENOXAPARIN SODIUM 30 MG: 100 INJECTION SUBCUTANEOUS at 20:47

## 2021-01-01 RX ADMIN — METOCLOPRAMIDE HYDROCHLORIDE 10 MG: 5 INJECTION INTRAMUSCULAR; INTRAVENOUS at 21:10

## 2021-01-01 RX ADMIN — ALBUTEROL SULFATE 4 PUFF: 90 AEROSOL, METERED RESPIRATORY (INHALATION) at 19:21

## 2021-01-01 RX ADMIN — DIGOXIN 500 MCG: 0.25 INJECTION INTRAMUSCULAR; INTRAVENOUS at 00:29

## 2021-01-01 RX ADMIN — FUROSEMIDE 60 MG: 10 INJECTION, SOLUTION INTRAMUSCULAR; INTRAVENOUS at 13:32

## 2021-01-01 RX ADMIN — ALBUTEROL SULFATE 4 PUFF: 90 AEROSOL, METERED RESPIRATORY (INHALATION) at 01:29

## 2021-01-01 RX ADMIN — CEFEPIME 2000 MG: 2 INJECTION, POWDER, FOR SOLUTION INTRAVENOUS at 21:53

## 2021-01-01 RX ADMIN — VANCOMYCIN 1500 MG: 1.5 INJECTION, SOLUTION INTRAVENOUS at 08:50

## 2021-01-01 RX ADMIN — KETOROLAC TROMETHAMINE 15 MG: 30 INJECTION, SOLUTION INTRAMUSCULAR; INTRAVENOUS at 01:09

## 2021-01-01 RX ADMIN — CEFEPIME 2000 MG: 2 INJECTION, POWDER, FOR SOLUTION INTRAVENOUS at 07:39

## 2021-01-01 RX ADMIN — Medication 100 MCG/HR: at 14:39

## 2021-01-01 RX ADMIN — ATORVASTATIN CALCIUM 10 MG: 10 TABLET, FILM COATED ORAL at 09:18

## 2021-01-01 RX ADMIN — Medication 200 MCG/HR: at 07:33

## 2021-01-01 RX ADMIN — ENOXAPARIN SODIUM 30 MG: 100 INJECTION SUBCUTANEOUS at 21:27

## 2021-01-01 RX ADMIN — SODIUM CHLORIDE, PRESERVATIVE FREE 10 ML: 5 INJECTION INTRAVENOUS at 08:50

## 2021-01-01 RX ADMIN — MAGNESIUM SULFATE HEPTAHYDRATE 1000 MG: 1 INJECTION, SOLUTION INTRAVENOUS at 18:17

## 2021-01-01 RX ADMIN — MINOCYCLINE HYDROCHLORIDE 100 MG: 100 CAPSULE ORAL at 10:17

## 2021-01-01 RX ADMIN — DEXAMETHASONE SODIUM PHOSPHATE 6 MG: 10 INJECTION INTRAMUSCULAR; INTRAVENOUS at 09:57

## 2021-01-01 RX ADMIN — EPINEPHRINE 1 MG: 0.1 INJECTION, SOLUTION ENDOTRACHEAL; INTRACARDIAC; INTRAVENOUS at 11:13

## 2021-01-01 RX ADMIN — ENOXAPARIN SODIUM 30 MG: 100 INJECTION SUBCUTANEOUS at 21:53

## 2021-01-01 RX ADMIN — SODIUM CHLORIDE, PRESERVATIVE FREE 10 ML: 5 INJECTION INTRAVENOUS at 20:39

## 2021-01-01 RX ADMIN — TAMSULOSIN HYDROCHLORIDE 0.4 MG: 0.4 CAPSULE ORAL at 09:18

## 2021-01-01 RX ADMIN — CEFTAZIDIME, AVIBACTAM 2.5 G: 2; .5 POWDER, FOR SOLUTION INTRAVENOUS at 14:27

## 2021-01-01 RX ADMIN — CEFEPIME 2000 MG: 2 INJECTION, POWDER, FOR SOLUTION INTRAVENOUS at 23:27

## 2021-01-01 RX ADMIN — METOPROLOL TARTRATE 5 MG: 5 INJECTION INTRAVENOUS at 01:53

## 2021-01-01 RX ADMIN — DEXAMETHASONE SODIUM PHOSPHATE 6 MG: 10 INJECTION INTRAMUSCULAR; INTRAVENOUS at 17:37

## 2021-01-01 RX ADMIN — VANCOMYCIN 1250 MG: 1.75 INJECTION, SOLUTION INTRAVENOUS at 10:21

## 2021-01-01 RX ADMIN — DEXMEDETOMIDINE 0.2 MCG/KG/HR: 100 INJECTION, SOLUTION, CONCENTRATE INTRAVENOUS at 11:34

## 2021-01-01 RX ADMIN — SODIUM CHLORIDE, PRESERVATIVE FREE 10 ML: 5 INJECTION INTRAVENOUS at 20:19

## 2021-01-01 RX ADMIN — IPRATROPIUM BROMIDE 4 PUFF: 17 AEROSOL, METERED RESPIRATORY (INHALATION) at 20:07

## 2021-01-01 RX ADMIN — Medication 200 MCG/HR: at 14:24

## 2021-01-01 RX ADMIN — SODIUM CHLORIDE: 9 INJECTION, SOLUTION INTRAVENOUS at 11:05

## 2021-01-01 RX ADMIN — SODIUM CHLORIDE, PRESERVATIVE FREE 10 ML: 5 INJECTION INTRAVENOUS at 08:08

## 2021-01-01 RX ADMIN — IPRATROPIUM BROMIDE 4 PUFF: 17 AEROSOL, METERED RESPIRATORY (INHALATION) at 15:51

## 2021-01-01 RX ADMIN — BARICITINIB 4 MG: 2 TABLET, FILM COATED ORAL at 08:03

## 2021-01-01 RX ADMIN — DEXMEDETOMIDINE 0.2 MCG/KG/HR: 100 INJECTION, SOLUTION, CONCENTRATE INTRAVENOUS at 09:06

## 2021-01-01 RX ADMIN — IPRATROPIUM BROMIDE 4 PUFF: 17 AEROSOL, METERED RESPIRATORY (INHALATION) at 07:49

## 2021-01-01 RX ADMIN — PANTOPRAZOLE SODIUM 40 MG: 40 INJECTION, POWDER, FOR SOLUTION INTRAVENOUS at 08:15

## 2021-01-01 RX ADMIN — Medication 35 MCG/HR: at 15:07

## 2021-01-01 RX ADMIN — ENOXAPARIN SODIUM 30 MG: 100 INJECTION SUBCUTANEOUS at 08:05

## 2021-01-01 RX ADMIN — MIDAZOLAM 7 MG/HR: 5 INJECTION INTRAMUSCULAR; INTRAVENOUS at 07:44

## 2021-01-01 RX ADMIN — CEFTAZIDIME, AVIBACTAM 2.5 G: 2; .5 POWDER, FOR SOLUTION INTRAVENOUS at 14:31

## 2021-01-01 RX ADMIN — ACETAMINOPHEN 650 MG: 325 TABLET ORAL at 19:49

## 2021-01-01 RX ADMIN — Medication 50 MEQ: at 11:16

## 2021-01-01 RX ADMIN — SODIUM CHLORIDE, PRESERVATIVE FREE 10 ML: 5 INJECTION INTRAVENOUS at 20:31

## 2021-01-01 RX ADMIN — ATORVASTATIN CALCIUM 10 MG: 10 TABLET, FILM COATED ORAL at 08:49

## 2021-01-01 RX ADMIN — BARICITINIB 4 MG: 2 TABLET, FILM COATED ORAL at 08:59

## 2021-01-01 RX ADMIN — CEFEPIME 2000 MG: 2 INJECTION, POWDER, FOR SOLUTION INTRAVENOUS at 06:53

## 2021-01-01 RX ADMIN — ALBUTEROL SULFATE 4 PUFF: 90 AEROSOL, METERED RESPIRATORY (INHALATION) at 16:50

## 2021-01-01 RX ADMIN — Medication 200 MCG/HR: at 05:02

## 2021-01-01 RX ADMIN — LOSARTAN POTASSIUM 100 MG: 50 TABLET, FILM COATED ORAL at 08:42

## 2021-01-01 RX ADMIN — Medication 175 MCG/HR: at 03:16

## 2021-01-01 RX ADMIN — ATENOLOL 50 MG: 50 TABLET ORAL at 09:58

## 2021-01-01 RX ADMIN — ALBUTEROL SULFATE 4 PUFF: 90 AEROSOL, METERED RESPIRATORY (INHALATION) at 23:34

## 2021-01-01 RX ADMIN — METOCLOPRAMIDE HYDROCHLORIDE 10 MG: 5 INJECTION INTRAMUSCULAR; INTRAVENOUS at 07:15

## 2021-01-01 RX ADMIN — IPRATROPIUM BROMIDE 4 PUFF: 17 AEROSOL, METERED RESPIRATORY (INHALATION) at 12:06

## 2021-01-01 RX ADMIN — ENOXAPARIN SODIUM 30 MG: 100 INJECTION SUBCUTANEOUS at 08:57

## 2021-01-01 RX ADMIN — VASOPRESSIN 0.03 UNITS/MIN: 20 INJECTION INTRAVENOUS at 20:25

## 2021-01-01 RX ADMIN — ALBUTEROL SULFATE 2 PUFF: 90 AEROSOL, METERED RESPIRATORY (INHALATION) at 05:05

## 2021-01-01 RX ADMIN — Medication 14 MCG/MIN: at 16:15

## 2021-01-01 RX ADMIN — METOCLOPRAMIDE HYDROCHLORIDE 10 MG: 5 INJECTION INTRAMUSCULAR; INTRAVENOUS at 04:52

## 2021-01-01 RX ADMIN — PANTOPRAZOLE SODIUM 40 MG: 40 INJECTION, POWDER, FOR SOLUTION INTRAVENOUS at 07:41

## 2021-01-01 RX ADMIN — ENOXAPARIN SODIUM 30 MG: 100 INJECTION SUBCUTANEOUS at 11:05

## 2021-01-01 RX ADMIN — VASOPRESSIN 0.04 UNITS/MIN: 20 INJECTION INTRAVENOUS at 02:37

## 2021-01-01 RX ADMIN — ALBUTEROL SULFATE 4 PUFF: 90 AEROSOL, METERED RESPIRATORY (INHALATION) at 13:32

## 2021-01-01 RX ADMIN — FUROSEMIDE 40 MG: 10 INJECTION, SOLUTION INTRAVENOUS at 16:40

## 2021-01-01 RX ADMIN — Medication 200 MCG/HR: at 01:45

## 2021-01-01 RX ADMIN — Medication 200 MCG/HR: at 13:24

## 2021-01-01 RX ADMIN — ALBUTEROL SULFATE 2 PUFF: 90 AEROSOL, METERED RESPIRATORY (INHALATION) at 22:52

## 2021-01-01 RX ADMIN — METOCLOPRAMIDE HYDROCHLORIDE 10 MG: 5 INJECTION INTRAMUSCULAR; INTRAVENOUS at 20:29

## 2021-01-01 RX ADMIN — METOCLOPRAMIDE HYDROCHLORIDE 10 MG: 5 INJECTION INTRAMUSCULAR; INTRAVENOUS at 05:44

## 2021-01-01 RX ADMIN — IPRATROPIUM BROMIDE 4 PUFF: 17 AEROSOL, METERED RESPIRATORY (INHALATION) at 19:56

## 2021-01-01 RX ADMIN — VANCOMYCIN 1500 MG: 1.5 INJECTION, SOLUTION INTRAVENOUS at 20:30

## 2021-01-01 RX ADMIN — LORAZEPAM 1 MG: 2 INJECTION INTRAMUSCULAR; INTRAVENOUS at 13:53

## 2021-01-01 RX ADMIN — PROPOFOL 65 MCG/KG/MIN: 10 INJECTION, EMULSION INTRAVENOUS at 20:15

## 2021-01-01 RX ADMIN — ENOXAPARIN SODIUM 30 MG: 100 INJECTION SUBCUTANEOUS at 21:18

## 2021-01-01 RX ADMIN — CEFTAZIDIME, AVIBACTAM 2.5 G: 2; .5 POWDER, FOR SOLUTION INTRAVENOUS at 07:05

## 2021-01-01 RX ADMIN — ALBUTEROL SULFATE 2 PUFF: 90 AEROSOL, METERED RESPIRATORY (INHALATION) at 12:24

## 2021-01-01 RX ADMIN — VANCOMYCIN 1500 MG: 1.5 INJECTION, SOLUTION INTRAVENOUS at 05:23

## 2021-01-01 RX ADMIN — ENOXAPARIN SODIUM 30 MG: 100 INJECTION SUBCUTANEOUS at 20:36

## 2021-01-01 RX ADMIN — SODIUM CHLORIDE, PRESERVATIVE FREE 10 ML: 5 INJECTION INTRAVENOUS at 21:08

## 2021-01-01 RX ADMIN — METOCLOPRAMIDE HYDROCHLORIDE 10 MG: 5 INJECTION INTRAMUSCULAR; INTRAVENOUS at 03:22

## 2021-01-01 RX ADMIN — ENOXAPARIN SODIUM 30 MG: 100 INJECTION SUBCUTANEOUS at 08:43

## 2021-01-01 RX ADMIN — METOCLOPRAMIDE HYDROCHLORIDE 10 MG: 5 INJECTION INTRAMUSCULAR; INTRAVENOUS at 21:30

## 2021-01-01 RX ADMIN — KETOROLAC TROMETHAMINE 15 MG: 30 INJECTION, SOLUTION INTRAMUSCULAR at 21:55

## 2021-01-01 RX ADMIN — SODIUM CHLORIDE: 9 INJECTION, SOLUTION INTRAVENOUS at 14:42

## 2021-01-01 RX ADMIN — CEFEPIME 2000 MG: 2 INJECTION, POWDER, FOR SOLUTION INTRAVENOUS at 06:36

## 2021-01-01 RX ADMIN — CEFEPIME 2000 MG: 2 INJECTION, POWDER, FOR SOLUTION INTRAVENOUS at 06:25

## 2021-01-01 RX ADMIN — METOCLOPRAMIDE HYDROCHLORIDE 10 MG: 5 INJECTION INTRAMUSCULAR; INTRAVENOUS at 13:02

## 2021-01-01 RX ADMIN — METOCLOPRAMIDE HYDROCHLORIDE 10 MG: 5 INJECTION INTRAMUSCULAR; INTRAVENOUS at 05:32

## 2021-01-01 RX ADMIN — ATENOLOL 50 MG: 50 TABLET ORAL at 20:23

## 2021-01-01 RX ADMIN — Medication 175 MCG/HR: at 14:25

## 2021-01-01 RX ADMIN — ATORVASTATIN CALCIUM 10 MG: 10 TABLET, FILM COATED ORAL at 08:50

## 2021-01-01 RX ADMIN — ENOXAPARIN SODIUM 30 MG: 100 INJECTION SUBCUTANEOUS at 08:35

## 2021-01-01 RX ADMIN — ALBUTEROL SULFATE 4 PUFF: 90 AEROSOL, METERED RESPIRATORY (INHALATION) at 04:13

## 2021-01-01 RX ADMIN — MIDAZOLAM 6 MG/HR: 5 INJECTION INTRAMUSCULAR; INTRAVENOUS at 21:01

## 2021-01-01 RX ADMIN — Medication 30 MCG/MIN: at 13:50

## 2021-01-01 RX ADMIN — SODIUM CHLORIDE, PRESERVATIVE FREE 10 ML: 5 INJECTION INTRAVENOUS at 08:43

## 2021-01-01 RX ADMIN — METOCLOPRAMIDE HYDROCHLORIDE 10 MG: 5 INJECTION INTRAMUSCULAR; INTRAVENOUS at 04:59

## 2021-01-01 RX ADMIN — PROPOFOL 35 MCG/KG/MIN: 10 INJECTION, EMULSION INTRAVENOUS at 21:41

## 2021-01-01 RX ADMIN — METOCLOPRAMIDE HYDROCHLORIDE 10 MG: 5 INJECTION INTRAMUSCULAR; INTRAVENOUS at 14:45

## 2021-01-01 RX ADMIN — METOCLOPRAMIDE HYDROCHLORIDE 10 MG: 5 INJECTION INTRAMUSCULAR; INTRAVENOUS at 20:18

## 2021-01-01 RX ADMIN — ENOXAPARIN SODIUM 30 MG: 100 INJECTION SUBCUTANEOUS at 09:11

## 2021-01-01 RX ADMIN — PANTOPRAZOLE SODIUM 40 MG: 40 INJECTION, POWDER, FOR SOLUTION INTRAVENOUS at 08:20

## 2021-01-01 RX ADMIN — MIDAZOLAM 7 MG/HR: 5 INJECTION INTRAMUSCULAR; INTRAVENOUS at 09:57

## 2021-01-01 RX ADMIN — ATENOLOL 50 MG: 50 TABLET ORAL at 21:53

## 2021-01-01 RX ADMIN — IPRATROPIUM BROMIDE 4 PUFF: 17 AEROSOL, METERED RESPIRATORY (INHALATION) at 11:09

## 2021-01-01 RX ADMIN — FUROSEMIDE 20 MG: 10 INJECTION, SOLUTION INTRAVENOUS at 11:47

## 2021-01-01 RX ADMIN — PROPOFOL 50 MCG/KG/MIN: 10 INJECTION, EMULSION INTRAVENOUS at 04:17

## 2021-01-01 RX ADMIN — IPRATROPIUM BROMIDE 4 PUFF: 17 AEROSOL, METERED RESPIRATORY (INHALATION) at 19:22

## 2021-01-01 RX ADMIN — ATORVASTATIN CALCIUM 10 MG: 10 TABLET, FILM COATED ORAL at 08:05

## 2021-01-01 RX ADMIN — CEFTAZIDIME, AVIBACTAM 2.5 G: 2; .5 POWDER, FOR SOLUTION INTRAVENOUS at 06:18

## 2021-01-01 RX ADMIN — IPRATROPIUM BROMIDE 4 PUFF: 17 AEROSOL, METERED RESPIRATORY (INHALATION) at 16:14

## 2021-01-01 RX ADMIN — PROPOFOL 50 MCG/KG/MIN: 10 INJECTION, EMULSION INTRAVENOUS at 17:31

## 2021-01-01 RX ADMIN — SODIUM CHLORIDE, PRESERVATIVE FREE 10 ML: 5 INJECTION INTRAVENOUS at 08:04

## 2021-01-01 RX ADMIN — ENOXAPARIN SODIUM 30 MG: 100 INJECTION SUBCUTANEOUS at 19:40

## 2021-01-01 RX ADMIN — PANTOPRAZOLE SODIUM 40 MG: 40 INJECTION, POWDER, FOR SOLUTION INTRAVENOUS at 08:36

## 2021-01-01 RX ADMIN — VASOPRESSIN 0.04 UNITS/MIN: 20 INJECTION INTRAVENOUS at 03:03

## 2021-01-01 RX ADMIN — LOSARTAN POTASSIUM 100 MG: 50 TABLET, FILM COATED ORAL at 09:32

## 2021-01-01 RX ADMIN — Medication 200 MCG/HR: at 09:10

## 2021-01-01 RX ADMIN — ATORVASTATIN CALCIUM 10 MG: 10 TABLET, FILM COATED ORAL at 09:32

## 2021-01-01 RX ADMIN — PANTOPRAZOLE SODIUM 40 MG: 40 INJECTION, POWDER, FOR SOLUTION INTRAVENOUS at 09:35

## 2021-01-01 RX ADMIN — SODIUM CHLORIDE, PRESERVATIVE FREE 10 ML: 5 INJECTION INTRAVENOUS at 21:04

## 2021-01-01 RX ADMIN — VANCOMYCIN 1500 MG: 1.5 INJECTION, SOLUTION INTRAVENOUS at 14:29

## 2021-01-01 RX ADMIN — SODIUM CHLORIDE 25 ML: 9 INJECTION, SOLUTION INTRAVENOUS at 14:52

## 2021-01-01 RX ADMIN — DEXAMETHASONE SODIUM PHOSPHATE 6 MG: 10 INJECTION INTRAMUSCULAR; INTRAVENOUS at 08:29

## 2021-01-01 RX ADMIN — Medication 200 MCG/HR: at 12:32

## 2021-01-01 RX ADMIN — SODIUM CHLORIDE, PRESERVATIVE FREE 10 ML: 5 INJECTION INTRAVENOUS at 19:40

## 2021-01-01 RX ADMIN — Medication 175 MCG/HR: at 08:06

## 2021-01-01 RX ADMIN — METOCLOPRAMIDE HYDROCHLORIDE 10 MG: 5 INJECTION INTRAMUSCULAR; INTRAVENOUS at 04:29

## 2021-01-01 RX ADMIN — IPRATROPIUM BROMIDE 4 PUFF: 17 AEROSOL, METERED RESPIRATORY (INHALATION) at 07:39

## 2021-01-01 RX ADMIN — ENOXAPARIN SODIUM 30 MG: 100 INJECTION SUBCUTANEOUS at 10:38

## 2021-01-01 RX ADMIN — METOCLOPRAMIDE HYDROCHLORIDE 10 MG: 5 INJECTION INTRAMUSCULAR; INTRAVENOUS at 20:19

## 2021-01-01 RX ADMIN — Medication 175 MCG/HR: at 20:54

## 2021-01-01 RX ADMIN — SODIUM CHLORIDE, PRESERVATIVE FREE 10 ML: 5 INJECTION INTRAVENOUS at 21:30

## 2021-01-01 RX ADMIN — IPRATROPIUM BROMIDE 4 PUFF: 17 AEROSOL, METERED RESPIRATORY (INHALATION) at 12:19

## 2021-01-01 RX ADMIN — PHENYLEPHRINE HYDROCHLORIDE 200 MCG/MIN: 10 INJECTION INTRAVENOUS at 20:23

## 2021-01-01 RX ADMIN — PROPOFOL 65 MCG/KG/MIN: 10 INJECTION, EMULSION INTRAVENOUS at 11:27

## 2021-01-01 RX ADMIN — IPRATROPIUM BROMIDE 4 PUFF: 17 AEROSOL, METERED RESPIRATORY (INHALATION) at 12:29

## 2021-01-01 RX ADMIN — METOCLOPRAMIDE HYDROCHLORIDE 10 MG: 5 INJECTION INTRAMUSCULAR; INTRAVENOUS at 13:57

## 2021-01-01 RX ADMIN — CEFEPIME 2000 MG: 2 INJECTION, POWDER, FOR SOLUTION INTRAVENOUS at 14:47

## 2021-01-01 RX ADMIN — IPRATROPIUM BROMIDE 4 PUFF: 17 AEROSOL, METERED RESPIRATORY (INHALATION) at 15:23

## 2021-01-01 RX ADMIN — MINOCYCLINE HYDROCHLORIDE 100 MG: 100 CAPSULE ORAL at 08:35

## 2021-01-01 RX ADMIN — ATENOLOL 50 MG: 50 TABLET ORAL at 21:04

## 2021-01-01 RX ADMIN — PROPOFOL 65 MCG/KG/MIN: 10 INJECTION, EMULSION INTRAVENOUS at 09:43

## 2021-01-01 RX ADMIN — SODIUM CHLORIDE, PRESERVATIVE FREE 10 ML: 5 INJECTION INTRAVENOUS at 08:46

## 2021-01-01 RX ADMIN — MIDAZOLAM 10 MG/HR: 5 INJECTION INTRAMUSCULAR; INTRAVENOUS at 10:50

## 2021-01-01 RX ADMIN — ACETAMINOPHEN 650 MG: 650 SUPPOSITORY RECTAL at 13:55

## 2021-01-01 RX ADMIN — CEFTAZIDIME, AVIBACTAM 2.5 G: 2; .5 POWDER, FOR SOLUTION INTRAVENOUS at 06:24

## 2021-01-01 RX ADMIN — IPRATROPIUM BROMIDE 4 PUFF: 17 AEROSOL, METERED RESPIRATORY (INHALATION) at 20:00

## 2021-01-01 RX ADMIN — ONDANSETRON 4 MG: 4 TABLET, ORALLY DISINTEGRATING ORAL at 03:07

## 2021-01-01 RX ADMIN — METOCLOPRAMIDE HYDROCHLORIDE 10 MG: 5 INJECTION INTRAMUSCULAR; INTRAVENOUS at 21:53

## 2021-01-01 RX ADMIN — BARICITINIB 4 MG: 2 TABLET, FILM COATED ORAL at 08:49

## 2021-01-01 RX ADMIN — MINOCYCLINE HYDROCHLORIDE 100 MG: 100 CAPSULE ORAL at 19:51

## 2021-01-01 RX ADMIN — Medication 200 MCG/HR: at 20:30

## 2021-01-01 RX ADMIN — SODIUM CHLORIDE, PRESERVATIVE FREE 10 ML: 5 INJECTION INTRAVENOUS at 08:05

## 2021-01-01 RX ADMIN — VANCOMYCIN 1500 MG: 1.5 INJECTION, SOLUTION INTRAVENOUS at 11:14

## 2021-01-01 RX ADMIN — PROPOFOL 65 MCG/KG/MIN: 10 INJECTION, EMULSION INTRAVENOUS at 16:40

## 2021-01-01 RX ADMIN — METOCLOPRAMIDE HYDROCHLORIDE 10 MG: 5 INJECTION INTRAMUSCULAR; INTRAVENOUS at 20:39

## 2021-01-01 RX ADMIN — METOCLOPRAMIDE HYDROCHLORIDE 10 MG: 5 INJECTION INTRAMUSCULAR; INTRAVENOUS at 06:15

## 2021-01-01 RX ADMIN — ACETAMINOPHEN 650 MG: 325 TABLET ORAL at 23:08

## 2021-01-01 RX ADMIN — Medication 100 MCG/HR: at 01:15

## 2021-01-01 RX ADMIN — CEFTAZIDIME, AVIBACTAM 2.5 G: 2; .5 POWDER, FOR SOLUTION INTRAVENOUS at 20:49

## 2021-01-01 RX ADMIN — Medication 50 MCG/HR: at 14:02

## 2021-01-01 RX ADMIN — SODIUM CHLORIDE, PRESERVATIVE FREE 10 ML: 5 INJECTION INTRAVENOUS at 21:05

## 2021-01-01 RX ADMIN — ATORVASTATIN CALCIUM 10 MG: 10 TABLET, FILM COATED ORAL at 09:00

## 2021-01-01 RX ADMIN — SODIUM CHLORIDE, PRESERVATIVE FREE 10 ML: 5 INJECTION INTRAVENOUS at 20:22

## 2021-01-01 RX ADMIN — METOCLOPRAMIDE HYDROCHLORIDE 10 MG: 5 INJECTION INTRAMUSCULAR; INTRAVENOUS at 13:00

## 2021-01-01 RX ADMIN — PHENYLEPHRINE HYDROCHLORIDE 155 MCG/MIN: 10 INJECTION INTRAVENOUS at 14:50

## 2021-01-01 RX ADMIN — CEFEPIME 2000 MG: 2 INJECTION, POWDER, FOR SOLUTION INTRAVENOUS at 23:03

## 2021-01-01 RX ADMIN — METOCLOPRAMIDE HYDROCHLORIDE 10 MG: 5 INJECTION INTRAMUSCULAR; INTRAVENOUS at 13:28

## 2021-01-01 RX ADMIN — SODIUM CHLORIDE, PRESERVATIVE FREE 10 ML: 5 INJECTION INTRAVENOUS at 20:02

## 2021-01-01 RX ADMIN — PANTOPRAZOLE SODIUM 40 MG: 40 INJECTION, POWDER, FOR SOLUTION INTRAVENOUS at 08:57

## 2021-01-01 RX ADMIN — SODIUM CHLORIDE, PRESERVATIVE FREE 20 ML: 5 INJECTION INTRAVENOUS at 21:09

## 2021-01-01 RX ADMIN — SODIUM CHLORIDE, PRESERVATIVE FREE 10 ML: 5 INJECTION INTRAVENOUS at 20:36

## 2021-01-01 RX ADMIN — MIDAZOLAM 5 MG/HR: 5 INJECTION INTRAMUSCULAR; INTRAVENOUS at 05:35

## 2021-01-01 RX ADMIN — Medication 150 MCG/HR: at 17:29

## 2021-01-01 RX ADMIN — IPRATROPIUM BROMIDE 4 PUFF: 17 AEROSOL, METERED RESPIRATORY (INHALATION) at 12:41

## 2021-01-01 RX ADMIN — METOCLOPRAMIDE HYDROCHLORIDE 10 MG: 5 INJECTION INTRAMUSCULAR; INTRAVENOUS at 05:35

## 2021-01-01 RX ADMIN — METOCLOPRAMIDE HYDROCHLORIDE 10 MG: 5 INJECTION INTRAMUSCULAR; INTRAVENOUS at 20:25

## 2021-01-01 RX ADMIN — MINOCYCLINE HYDROCHLORIDE 100 MG: 100 CAPSULE ORAL at 20:47

## 2021-01-01 RX ADMIN — LOSARTAN POTASSIUM 100 MG: 50 TABLET, FILM COATED ORAL at 09:33

## 2021-01-01 RX ADMIN — SODIUM CHLORIDE, PRESERVATIVE FREE 10 ML: 5 INJECTION INTRAVENOUS at 21:11

## 2021-01-01 RX ADMIN — METHYLPREDNISOLONE SODIUM SUCCINATE 70 MG: 125 INJECTION, POWDER, FOR SOLUTION INTRAMUSCULAR; INTRAVENOUS at 10:16

## 2021-01-01 RX ADMIN — IPRATROPIUM BROMIDE 4 PUFF: 17 AEROSOL, METERED RESPIRATORY (INHALATION) at 09:06

## 2021-01-01 RX ADMIN — DEXMEDETOMIDINE 0.3 MCG/KG/HR: 100 INJECTION, SOLUTION, CONCENTRATE INTRAVENOUS at 01:36

## 2021-01-01 RX ADMIN — SODIUM CHLORIDE, PRESERVATIVE FREE 10 ML: 5 INJECTION INTRAVENOUS at 20:25

## 2021-01-01 RX ADMIN — METOCLOPRAMIDE HYDROCHLORIDE 10 MG: 5 INJECTION INTRAMUSCULAR; INTRAVENOUS at 20:36

## 2021-01-01 RX ADMIN — SODIUM CHLORIDE: 9 INJECTION, SOLUTION INTRAVENOUS at 07:33

## 2021-01-01 RX ADMIN — MINOCYCLINE HYDROCHLORIDE 100 MG: 100 CAPSULE ORAL at 08:00

## 2021-01-01 RX ADMIN — CEFTAZIDIME, AVIBACTAM 2.5 G: 2; .5 POWDER, FOR SOLUTION INTRAVENOUS at 14:53

## 2021-01-01 RX ADMIN — IPRATROPIUM BROMIDE 4 PUFF: 17 AEROSOL, METERED RESPIRATORY (INHALATION) at 08:10

## 2021-01-01 RX ADMIN — BARICITINIB 4 MG: 2 TABLET, FILM COATED ORAL at 08:42

## 2021-01-01 RX ADMIN — IPRATROPIUM BROMIDE 4 PUFF: 17 AEROSOL, METERED RESPIRATORY (INHALATION) at 15:39

## 2021-01-01 RX ADMIN — PANTOPRAZOLE SODIUM 40 MG: 40 INJECTION, POWDER, FOR SOLUTION INTRAVENOUS at 09:11

## 2021-01-01 RX ADMIN — MINOCYCLINE HYDROCHLORIDE 100 MG: 100 CAPSULE ORAL at 09:10

## 2021-01-01 RX ADMIN — DEXAMETHASONE SODIUM PHOSPHATE 6 MG: 10 INJECTION INTRAMUSCULAR; INTRAVENOUS at 08:37

## 2021-01-01 RX ADMIN — IPRATROPIUM BROMIDE 4 PUFF: 17 AEROSOL, METERED RESPIRATORY (INHALATION) at 09:14

## 2021-01-01 RX ADMIN — ENOXAPARIN SODIUM 30 MG: 100 INJECTION SUBCUTANEOUS at 20:19

## 2021-01-01 RX ADMIN — IPRATROPIUM BROMIDE 4 PUFF: 17 AEROSOL, METERED RESPIRATORY (INHALATION) at 16:07

## 2021-01-01 RX ADMIN — METOCLOPRAMIDE HYDROCHLORIDE 10 MG: 5 INJECTION INTRAMUSCULAR; INTRAVENOUS at 05:36

## 2021-01-01 RX ADMIN — IPRATROPIUM BROMIDE 4 PUFF: 17 AEROSOL, METERED RESPIRATORY (INHALATION) at 19:51

## 2021-01-01 RX ADMIN — Medication 0.2 MG: at 13:53

## 2021-01-01 RX ADMIN — ATORVASTATIN CALCIUM 10 MG: 10 TABLET, FILM COATED ORAL at 08:57

## 2021-01-01 RX ADMIN — PHENYLEPHRINE HYDROCHLORIDE 55 MCG/MIN: 10 INJECTION INTRAVENOUS at 03:56

## 2021-01-01 RX ADMIN — IPRATROPIUM BROMIDE 4 PUFF: 17 AEROSOL, METERED RESPIRATORY (INHALATION) at 12:15

## 2021-01-01 RX ADMIN — ACETAMINOPHEN 650 MG: 325 TABLET ORAL at 06:32

## 2021-01-01 RX ADMIN — SODIUM CHLORIDE, PRESERVATIVE FREE 10 ML: 5 INJECTION INTRAVENOUS at 10:17

## 2021-01-01 RX ADMIN — SODIUM CHLORIDE, PRESERVATIVE FREE 10 ML: 5 INJECTION INTRAVENOUS at 08:00

## 2021-01-01 RX ADMIN — ENOXAPARIN SODIUM 30 MG: 100 INJECTION SUBCUTANEOUS at 08:15

## 2021-01-01 RX ADMIN — CEFEPIME 2000 MG: 2 INJECTION, POWDER, FOR SOLUTION INTRAVENOUS at 23:15

## 2021-01-01 RX ADMIN — METOCLOPRAMIDE HYDROCHLORIDE 10 MG: 5 INJECTION INTRAMUSCULAR; INTRAVENOUS at 13:33

## 2021-01-01 RX ADMIN — MINOCYCLINE HYDROCHLORIDE 100 MG: 100 CAPSULE ORAL at 21:30

## 2021-01-01 RX ADMIN — VASOPRESSIN 0.04 UNITS/MIN: 20 INJECTION INTRAVENOUS at 21:13

## 2021-01-01 RX ADMIN — SODIUM CHLORIDE, PRESERVATIVE FREE 10 ML: 5 INJECTION INTRAVENOUS at 08:36

## 2021-01-01 RX ADMIN — ALBUTEROL SULFATE 4 PUFF: 90 AEROSOL, METERED RESPIRATORY (INHALATION) at 21:03

## 2021-01-01 RX ADMIN — SODIUM CHLORIDE, PRESERVATIVE FREE 10 ML: 5 INJECTION INTRAVENOUS at 09:00

## 2021-01-01 RX ADMIN — ACETAMINOPHEN 650 MG: 325 TABLET ORAL at 01:53

## 2021-01-01 RX ADMIN — VANCOMYCIN 1500 MG: 1.5 INJECTION, SOLUTION INTRAVENOUS at 20:40

## 2021-01-01 RX ADMIN — PHENYLEPHRINE HYDROCHLORIDE 30 MCG/MIN: 10 INJECTION INTRAVENOUS at 23:37

## 2021-01-01 RX ADMIN — ENOXAPARIN SODIUM 30 MG: 100 INJECTION SUBCUTANEOUS at 09:58

## 2021-01-01 RX ADMIN — LOSARTAN POTASSIUM 100 MG: 50 TABLET, FILM COATED ORAL at 08:23

## 2021-01-01 RX ADMIN — METOCLOPRAMIDE HYDROCHLORIDE 10 MG: 5 INJECTION INTRAMUSCULAR; INTRAVENOUS at 21:49

## 2021-01-01 RX ADMIN — Medication 150 MCG/HR: at 10:43

## 2021-01-01 RX ADMIN — ACETAMINOPHEN 650 MG: 650 SUPPOSITORY RECTAL at 17:29

## 2021-01-01 RX ADMIN — PROPOFOL 50 MCG/KG/MIN: 10 INJECTION, EMULSION INTRAVENOUS at 14:02

## 2021-01-01 RX ADMIN — CEFEPIME 2000 MG: 2 INJECTION, POWDER, FOR SOLUTION INTRAVENOUS at 23:09

## 2021-01-01 RX ADMIN — SODIUM CHLORIDE 1000 ML: 9 INJECTION, SOLUTION INTRAVENOUS at 12:46

## 2021-01-01 RX ADMIN — Medication 200 MCG/HR: at 00:51

## 2021-01-01 RX ADMIN — METOCLOPRAMIDE HYDROCHLORIDE 10 MG: 5 INJECTION INTRAMUSCULAR; INTRAVENOUS at 12:47

## 2021-01-01 RX ADMIN — IPRATROPIUM BROMIDE 4 PUFF: 17 AEROSOL, METERED RESPIRATORY (INHALATION) at 08:55

## 2021-01-01 RX ADMIN — ATORVASTATIN CALCIUM 10 MG: 10 TABLET, FILM COATED ORAL at 08:43

## 2021-01-01 RX ADMIN — ACETAMINOPHEN 650 MG: 325 TABLET ORAL at 20:30

## 2021-01-01 RX ADMIN — PANTOPRAZOLE SODIUM 40 MG: 40 INJECTION, POWDER, FOR SOLUTION INTRAVENOUS at 09:18

## 2021-01-01 RX ADMIN — METOPROLOL TARTRATE 2.5 MG: 5 INJECTION INTRAVENOUS at 04:55

## 2021-01-01 RX ADMIN — METOCLOPRAMIDE HYDROCHLORIDE 10 MG: 5 INJECTION INTRAMUSCULAR; INTRAVENOUS at 21:08

## 2021-01-01 RX ADMIN — ENOXAPARIN SODIUM 30 MG: 100 INJECTION SUBCUTANEOUS at 20:29

## 2021-01-01 RX ADMIN — ENOXAPARIN SODIUM 30 MG: 100 INJECTION SUBCUTANEOUS at 20:01

## 2021-01-01 RX ADMIN — PROPOFOL 65 MCG/KG/MIN: 10 INJECTION, EMULSION INTRAVENOUS at 14:08

## 2021-01-01 RX ADMIN — ATENOLOL 50 MG: 50 TABLET ORAL at 09:33

## 2021-01-01 RX ADMIN — PROPOFOL 65 MCG/KG/MIN: 10 INJECTION, EMULSION INTRAVENOUS at 17:46

## 2021-01-01 RX ADMIN — CEFTAZIDIME, AVIBACTAM 2.5 G: 2; .5 POWDER, FOR SOLUTION INTRAVENOUS at 05:31

## 2021-01-01 RX ADMIN — ATENOLOL 50 MG: 50 TABLET ORAL at 08:44

## 2021-01-01 RX ADMIN — ENOXAPARIN SODIUM 30 MG: 100 INJECTION SUBCUTANEOUS at 09:33

## 2021-01-01 RX ADMIN — SODIUM CHLORIDE: 9 INJECTION, SOLUTION INTRAVENOUS at 17:10

## 2021-01-01 RX ADMIN — BARICITINIB 4 MG: 2 TABLET, FILM COATED ORAL at 09:33

## 2021-01-01 RX ADMIN — SODIUM CHLORIDE, PRESERVATIVE FREE 10 ML: 5 INJECTION INTRAVENOUS at 21:15

## 2021-01-01 RX ADMIN — SODIUM CHLORIDE, PRESERVATIVE FREE 10 ML: 5 INJECTION INTRAVENOUS at 10:47

## 2021-01-01 RX ADMIN — METOCLOPRAMIDE HYDROCHLORIDE 10 MG: 5 INJECTION INTRAMUSCULAR; INTRAVENOUS at 05:16

## 2021-01-01 RX ADMIN — DEXAMETHASONE SODIUM PHOSPHATE 6 MG: 10 INJECTION INTRAMUSCULAR; INTRAVENOUS at 08:15

## 2021-01-01 RX ADMIN — ENOXAPARIN SODIUM 30 MG: 100 INJECTION SUBCUTANEOUS at 08:00

## 2021-01-01 RX ADMIN — SODIUM CHLORIDE, PRESERVATIVE FREE 10 ML: 5 INJECTION INTRAVENOUS at 09:33

## 2021-01-01 RX ADMIN — PROPOFOL 65 MCG/KG/MIN: 10 INJECTION, EMULSION INTRAVENOUS at 22:12

## 2021-01-01 RX ADMIN — CEFEPIME 2000 MG: 2 INJECTION, POWDER, FOR SOLUTION INTRAVENOUS at 22:20

## 2021-01-01 RX ADMIN — SODIUM CHLORIDE, PRESERVATIVE FREE 10 ML: 5 INJECTION INTRAVENOUS at 08:28

## 2021-01-01 RX ADMIN — TAMSULOSIN HYDROCHLORIDE 0.4 MG: 0.4 CAPSULE ORAL at 07:41

## 2021-01-01 RX ADMIN — CEFTAZIDIME, AVIBACTAM 2.5 G: 2; .5 POWDER, FOR SOLUTION INTRAVENOUS at 21:48

## 2021-01-01 RX ADMIN — METOCLOPRAMIDE HYDROCHLORIDE 10 MG: 5 INJECTION INTRAMUSCULAR; INTRAVENOUS at 12:57

## 2021-01-01 RX ADMIN — ALBUTEROL SULFATE 2 PUFF: 90 AEROSOL, METERED RESPIRATORY (INHALATION) at 07:59

## 2021-01-01 RX ADMIN — METHYLPREDNISOLONE SODIUM SUCCINATE 70 MG: 125 INJECTION, POWDER, FOR SOLUTION INTRAMUSCULAR; INTRAVENOUS at 08:00

## 2021-01-01 RX ADMIN — PROPOFOL 65 MCG/KG/MIN: 10 INJECTION, EMULSION INTRAVENOUS at 08:48

## 2021-01-01 RX ADMIN — Medication 150 MCG/HR: at 15:46

## 2021-01-01 RX ADMIN — LOSARTAN POTASSIUM 100 MG: 50 TABLET, FILM COATED ORAL at 08:49

## 2021-01-01 RX ADMIN — PROPOFOL 65 MCG/KG/MIN: 10 INJECTION, EMULSION INTRAVENOUS at 23:42

## 2021-01-01 RX ADMIN — IPRATROPIUM BROMIDE 4 PUFF: 17 AEROSOL, METERED RESPIRATORY (INHALATION) at 15:38

## 2021-01-01 RX ADMIN — MINOCYCLINE HYDROCHLORIDE 100 MG: 100 CAPSULE ORAL at 14:29

## 2021-01-01 RX ADMIN — IMMUNE GLOBULIN (HUMAN) 40 G: 10 INJECTION INTRAVENOUS; SUBCUTANEOUS at 17:46

## 2021-01-01 RX ADMIN — SODIUM CHLORIDE, PRESERVATIVE FREE 10 ML: 5 INJECTION INTRAVENOUS at 20:16

## 2021-01-01 RX ADMIN — PANTOPRAZOLE SODIUM 40 MG: 40 INJECTION, POWDER, FOR SOLUTION INTRAVENOUS at 08:34

## 2021-01-01 RX ADMIN — ENOXAPARIN SODIUM 30 MG: 100 INJECTION SUBCUTANEOUS at 20:23

## 2021-01-01 RX ADMIN — PROPOFOL 50 MCG/KG/MIN: 10 INJECTION, EMULSION INTRAVENOUS at 21:05

## 2021-01-01 RX ADMIN — ATENOLOL 50 MG: 50 TABLET ORAL at 09:32

## 2021-01-01 RX ADMIN — DEXAMETHASONE SODIUM PHOSPHATE 6 MG: 10 INJECTION INTRAMUSCULAR; INTRAVENOUS at 08:57

## 2021-01-01 RX ADMIN — ENOXAPARIN SODIUM 30 MG: 100 INJECTION SUBCUTANEOUS at 07:59

## 2021-01-01 RX ADMIN — IPRATROPIUM BROMIDE 4 PUFF: 17 AEROSOL, METERED RESPIRATORY (INHALATION) at 16:34

## 2021-01-01 RX ADMIN — Medication 200 MCG/HR: at 20:21

## 2021-01-01 RX ADMIN — METOCLOPRAMIDE HYDROCHLORIDE 10 MG: 5 INJECTION INTRAMUSCULAR; INTRAVENOUS at 13:35

## 2021-01-01 RX ADMIN — IPRATROPIUM BROMIDE 4 PUFF: 17 AEROSOL, METERED RESPIRATORY (INHALATION) at 19:23

## 2021-01-01 RX ADMIN — IPRATROPIUM BROMIDE 4 PUFF: 17 AEROSOL, METERED RESPIRATORY (INHALATION) at 21:51

## 2021-01-01 RX ADMIN — ATENOLOL 50 MG: 50 TABLET ORAL at 10:38

## 2021-01-01 RX ADMIN — TAMSULOSIN HYDROCHLORIDE 0.4 MG: 0.4 CAPSULE ORAL at 08:29

## 2021-01-01 RX ADMIN — SODIUM CHLORIDE: 9 INJECTION, SOLUTION INTRAVENOUS at 20:32

## 2021-01-01 RX ADMIN — SODIUM CHLORIDE, PRESERVATIVE FREE 10 ML: 5 INJECTION INTRAVENOUS at 09:58

## 2021-01-01 RX ADMIN — SODIUM CHLORIDE: 9 INJECTION, SOLUTION INTRAVENOUS at 23:46

## 2021-01-01 RX ADMIN — ALBUTEROL SULFATE 4 PUFF: 90 AEROSOL, METERED RESPIRATORY (INHALATION) at 15:23

## 2021-01-01 RX ADMIN — ONDANSETRON 4 MG: 2 INJECTION INTRAMUSCULAR; INTRAVENOUS at 09:07

## 2021-01-01 RX ADMIN — TAMSULOSIN HYDROCHLORIDE 0.4 MG: 0.4 CAPSULE ORAL at 11:05

## 2021-01-01 RX ADMIN — ATORVASTATIN CALCIUM 10 MG: 10 TABLET, FILM COATED ORAL at 09:58

## 2021-01-01 RX ADMIN — METOCLOPRAMIDE HYDROCHLORIDE 10 MG: 5 INJECTION INTRAMUSCULAR; INTRAVENOUS at 05:39

## 2021-01-01 RX ADMIN — VANCOMYCIN 1250 MG: 1.75 INJECTION, SOLUTION INTRAVENOUS at 10:54

## 2021-01-01 RX ADMIN — METOCLOPRAMIDE HYDROCHLORIDE 10 MG: 5 INJECTION INTRAMUSCULAR; INTRAVENOUS at 21:18

## 2021-01-01 RX ADMIN — IPRATROPIUM BROMIDE 4 PUFF: 17 AEROSOL, METERED RESPIRATORY (INHALATION) at 07:16

## 2021-01-01 RX ADMIN — ACETAMINOPHEN 650 MG: 650 SUPPOSITORY RECTAL at 00:47

## 2021-01-01 RX ADMIN — CEFTAZIDIME, AVIBACTAM 2.5 G: 2; .5 POWDER, FOR SOLUTION INTRAVENOUS at 22:14

## 2021-01-01 RX ADMIN — PANTOPRAZOLE SODIUM 40 MG: 40 INJECTION, POWDER, FOR SOLUTION INTRAVENOUS at 08:49

## 2021-01-01 RX ADMIN — METOCLOPRAMIDE HYDROCHLORIDE 10 MG: 5 INJECTION INTRAMUSCULAR; INTRAVENOUS at 04:30

## 2021-01-01 RX ADMIN — AMIODARONE HYDROCHLORIDE 300 MG: 50 INJECTION, SOLUTION INTRAVENOUS at 11:19

## 2021-01-01 RX ADMIN — TAMSULOSIN HYDROCHLORIDE 0.4 MG: 0.4 CAPSULE ORAL at 10:05

## 2021-01-01 RX ADMIN — IPRATROPIUM BROMIDE 4 PUFF: 17 AEROSOL, METERED RESPIRATORY (INHALATION) at 15:43

## 2021-01-01 RX ADMIN — DEXAMETHASONE SODIUM PHOSPHATE 6 MG: 10 INJECTION INTRAMUSCULAR; INTRAVENOUS at 12:54

## 2021-01-01 RX ADMIN — METOCLOPRAMIDE HYDROCHLORIDE 10 MG: 5 INJECTION INTRAMUSCULAR; INTRAVENOUS at 05:27

## 2021-01-01 RX ADMIN — TAMSULOSIN HYDROCHLORIDE 0.4 MG: 0.4 CAPSULE ORAL at 10:17

## 2021-01-01 RX ADMIN — PROPOFOL 65 MCG/KG/MIN: 10 INJECTION, EMULSION INTRAVENOUS at 19:14

## 2021-01-01 RX ADMIN — Medication 25 MCG/HR: at 14:53

## 2021-01-01 RX ADMIN — ATORVASTATIN CALCIUM 10 MG: 10 TABLET, FILM COATED ORAL at 08:29

## 2021-01-01 RX ADMIN — METHYLPREDNISOLONE SODIUM SUCCINATE 70 MG: 125 INJECTION, POWDER, FOR SOLUTION INTRAMUSCULAR; INTRAVENOUS at 07:41

## 2021-01-01 RX ADMIN — PANTOPRAZOLE SODIUM 40 MG: 40 INJECTION, POWDER, FOR SOLUTION INTRAVENOUS at 09:32

## 2021-01-01 RX ADMIN — MIDAZOLAM 7 MG/HR: 5 INJECTION INTRAMUSCULAR; INTRAVENOUS at 09:02

## 2021-01-01 RX ADMIN — ATORVASTATIN CALCIUM 10 MG: 10 TABLET, FILM COATED ORAL at 08:04

## 2021-01-01 RX ADMIN — ONDANSETRON 4 MG: 2 INJECTION INTRAMUSCULAR; INTRAVENOUS at 10:30

## 2021-01-01 RX ADMIN — CEFEPIME 2000 MG: 2 INJECTION, POWDER, FOR SOLUTION INTRAVENOUS at 23:33

## 2021-01-01 RX ADMIN — Medication 4 MCG/MIN: at 12:00

## 2021-01-01 RX ADMIN — SODIUM CHLORIDE, PRESERVATIVE FREE 10 ML: 5 INJECTION INTRAVENOUS at 02:55

## 2021-01-01 RX ADMIN — MIDAZOLAM 10 MG/HR: 5 INJECTION INTRAMUSCULAR; INTRAVENOUS at 13:13

## 2021-01-01 RX ADMIN — PROPOFOL 50 MCG/KG/MIN: 10 INJECTION, EMULSION INTRAVENOUS at 00:45

## 2021-01-01 RX ADMIN — DOPAMINE HYDROCHLORIDE IN DEXTROSE 2 MCG/KG/MIN: 1.6 INJECTION, SOLUTION INTRAVENOUS at 17:36

## 2021-01-01 RX ADMIN — ATENOLOL 50 MG: 50 TABLET ORAL at 21:26

## 2021-01-01 RX ADMIN — ENOXAPARIN SODIUM 30 MG: 100 INJECTION SUBCUTANEOUS at 23:09

## 2021-01-01 RX ADMIN — ENOXAPARIN SODIUM 30 MG: 100 INJECTION SUBCUTANEOUS at 08:58

## 2021-01-01 RX ADMIN — METOCLOPRAMIDE HYDROCHLORIDE 10 MG: 5 INJECTION INTRAMUSCULAR; INTRAVENOUS at 13:29

## 2021-01-01 RX ADMIN — ENOXAPARIN SODIUM 30 MG: 100 INJECTION SUBCUTANEOUS at 08:49

## 2021-01-01 RX ADMIN — METOCLOPRAMIDE HYDROCHLORIDE 10 MG: 5 INJECTION INTRAMUSCULAR; INTRAVENOUS at 06:04

## 2021-01-01 RX ADMIN — ACETAMINOPHEN 650 MG: 650 SUPPOSITORY RECTAL at 08:35

## 2021-01-01 RX ADMIN — IPRATROPIUM BROMIDE 4 PUFF: 17 AEROSOL, METERED RESPIRATORY (INHALATION) at 09:12

## 2021-01-01 RX ADMIN — CEFEPIME 2000 MG: 2 INJECTION, POWDER, FOR SOLUTION INTRAVENOUS at 15:13

## 2021-01-01 RX ADMIN — ENOXAPARIN SODIUM 30 MG: 100 INJECTION SUBCUTANEOUS at 08:08

## 2021-01-01 RX ADMIN — ENOXAPARIN SODIUM 30 MG: 100 INJECTION SUBCUTANEOUS at 09:34

## 2021-01-01 RX ADMIN — CEFTAZIDIME, AVIBACTAM 2.5 G: 2; .5 POWDER, FOR SOLUTION INTRAVENOUS at 06:39

## 2021-01-01 RX ADMIN — METOCLOPRAMIDE HYDROCHLORIDE 10 MG: 5 INJECTION INTRAMUSCULAR; INTRAVENOUS at 20:02

## 2021-01-01 RX ADMIN — Medication 200 MCG/HR: at 22:38

## 2021-01-01 RX ADMIN — ATORVASTATIN CALCIUM 10 MG: 10 TABLET, FILM COATED ORAL at 08:24

## 2021-01-01 RX ADMIN — Medication 175 MCG/HR: at 00:28

## 2021-01-01 RX ADMIN — PROPOFOL 20 MCG/KG/MIN: 10 INJECTION, EMULSION INTRAVENOUS at 05:40

## 2021-01-01 RX ADMIN — MIDAZOLAM 5 MG/HR: 5 INJECTION INTRAMUSCULAR; INTRAVENOUS at 07:29

## 2021-01-01 RX ADMIN — METHYLPREDNISOLONE SODIUM SUCCINATE 70 MG: 125 INJECTION, POWDER, FOR SOLUTION INTRAMUSCULAR; INTRAVENOUS at 16:58

## 2021-01-01 RX ADMIN — METOCLOPRAMIDE HYDROCHLORIDE 10 MG: 5 INJECTION INTRAMUSCULAR; INTRAVENOUS at 06:07

## 2021-01-01 RX ADMIN — ENOXAPARIN SODIUM 30 MG: 100 INJECTION SUBCUTANEOUS at 08:38

## 2021-01-01 RX ADMIN — ALBUTEROL SULFATE 4 PUFF: 90 AEROSOL, METERED RESPIRATORY (INHALATION) at 09:50

## 2021-01-01 RX ADMIN — PROPOFOL 65 MCG/KG/MIN: 10 INJECTION, EMULSION INTRAVENOUS at 03:30

## 2021-01-01 RX ADMIN — ALBUTEROL SULFATE 4 PUFF: 90 AEROSOL, METERED RESPIRATORY (INHALATION) at 09:05

## 2021-01-01 RX ADMIN — CEFTAZIDIME, AVIBACTAM 2.5 G: 2; .5 POWDER, FOR SOLUTION INTRAVENOUS at 22:05

## 2021-01-01 RX ADMIN — ALBUMIN (HUMAN) 25 G: 0.25 INJECTION, SOLUTION INTRAVENOUS at 04:14

## 2021-01-01 RX ADMIN — DEXAMETHASONE SODIUM PHOSPHATE 6 MG: 10 INJECTION INTRAMUSCULAR; INTRAVENOUS at 08:50

## 2021-01-01 RX ADMIN — IPRATROPIUM BROMIDE 4 PUFF: 17 AEROSOL, METERED RESPIRATORY (INHALATION) at 12:04

## 2021-01-01 RX ADMIN — PROPOFOL 65 MCG/KG/MIN: 10 INJECTION, EMULSION INTRAVENOUS at 04:22

## 2021-01-01 RX ADMIN — MIDAZOLAM HYDROCHLORIDE 4 MG: 1 INJECTION, SOLUTION INTRAMUSCULAR; INTRAVENOUS at 11:50

## 2021-01-01 RX ADMIN — ENOXAPARIN SODIUM 30 MG: 100 INJECTION SUBCUTANEOUS at 21:11

## 2021-01-01 RX ADMIN — TAMSULOSIN HYDROCHLORIDE 0.4 MG: 0.4 CAPSULE ORAL at 09:32

## 2021-01-01 RX ADMIN — IPRATROPIUM BROMIDE 4 PUFF: 17 AEROSOL, METERED RESPIRATORY (INHALATION) at 17:01

## 2021-01-01 RX ADMIN — ENOXAPARIN SODIUM 30 MG: 100 INJECTION SUBCUTANEOUS at 10:25

## 2021-01-01 RX ADMIN — PROPOFOL 35 MCG/KG/MIN: 10 INJECTION, EMULSION INTRAVENOUS at 22:17

## 2021-01-01 RX ADMIN — BARICITINIB 4 MG: 2 TABLET, FILM COATED ORAL at 08:35

## 2021-01-01 RX ADMIN — ONDANSETRON 4 MG: 2 INJECTION INTRAMUSCULAR; INTRAVENOUS at 05:36

## 2021-01-01 RX ADMIN — CEFEPIME 2000 MG: 2 INJECTION, POWDER, FOR SOLUTION INTRAVENOUS at 14:27

## 2021-01-01 RX ADMIN — PANTOPRAZOLE SODIUM 40 MG: 40 INJECTION, POWDER, FOR SOLUTION INTRAVENOUS at 11:05

## 2021-01-01 RX ADMIN — Medication 200 MCG/HR: at 19:04

## 2021-01-01 RX ADMIN — Medication 125 MCG/HR: at 17:17

## 2021-01-01 RX ADMIN — SODIUM CHLORIDE, PRESERVATIVE FREE 10 ML: 5 INJECTION INTRAVENOUS at 20:20

## 2021-01-01 RX ADMIN — ENOXAPARIN SODIUM 30 MG: 100 INJECTION SUBCUTANEOUS at 20:03

## 2021-01-01 RX ADMIN — BARICITINIB 4 MG: 2 TABLET, FILM COATED ORAL at 07:57

## 2021-01-01 RX ADMIN — METOCLOPRAMIDE HYDROCHLORIDE 10 MG: 5 INJECTION INTRAMUSCULAR; INTRAVENOUS at 05:41

## 2021-01-01 RX ADMIN — ALBUTEROL SULFATE 4 PUFF: 90 AEROSOL, METERED RESPIRATORY (INHALATION) at 12:40

## 2021-01-01 RX ADMIN — CEFEPIME 2000 MG: 2 INJECTION, POWDER, FOR SOLUTION INTRAVENOUS at 21:41

## 2021-01-01 RX ADMIN — IPRATROPIUM BROMIDE 4 PUFF: 17 AEROSOL, METERED RESPIRATORY (INHALATION) at 19:30

## 2021-01-01 RX ADMIN — METOCLOPRAMIDE HYDROCHLORIDE 10 MG: 5 INJECTION INTRAMUSCULAR; INTRAVENOUS at 12:53

## 2021-01-01 RX ADMIN — Medication 150 MCG/HR: at 04:15

## 2021-01-01 RX ADMIN — IPRATROPIUM BROMIDE 4 PUFF: 17 AEROSOL, METERED RESPIRATORY (INHALATION) at 11:33

## 2021-01-01 RX ADMIN — LOSARTAN POTASSIUM 100 MG: 50 TABLET, FILM COATED ORAL at 09:00

## 2021-01-01 RX ADMIN — Medication 200 MCG/HR: at 04:27

## 2021-01-01 RX ADMIN — METOCLOPRAMIDE HYDROCHLORIDE 10 MG: 5 INJECTION INTRAMUSCULAR; INTRAVENOUS at 20:01

## 2021-01-01 RX ADMIN — ATORVASTATIN CALCIUM 10 MG: 10 TABLET, FILM COATED ORAL at 09:40

## 2021-01-01 RX ADMIN — MIDAZOLAM 7 MG/HR: 5 INJECTION INTRAMUSCULAR; INTRAVENOUS at 21:02

## 2021-01-01 RX ADMIN — ALBUTEROL SULFATE 4 PUFF: 90 AEROSOL, METERED RESPIRATORY (INHALATION) at 19:30

## 2021-01-01 RX ADMIN — PHENYLEPHRINE HYDROCHLORIDE 200 MCG/MIN: 10 INJECTION INTRAVENOUS at 15:37

## 2021-01-01 RX ADMIN — MINOCYCLINE HYDROCHLORIDE 100 MG: 100 CAPSULE ORAL at 20:18

## 2021-01-01 RX ADMIN — PROPOFOL 45 MCG/KG/MIN: 10 INJECTION, EMULSION INTRAVENOUS at 02:52

## 2021-01-01 RX ADMIN — ENOXAPARIN SODIUM 30 MG: 100 INJECTION SUBCUTANEOUS at 10:17

## 2021-01-01 RX ADMIN — PROPOFOL 50 MCG/KG/MIN: 10 INJECTION, EMULSION INTRAVENOUS at 14:49

## 2021-01-01 RX ADMIN — CEFTAZIDIME, AVIBACTAM 2.5 G: 2; .5 POWDER, FOR SOLUTION INTRAVENOUS at 22:00

## 2021-01-01 RX ADMIN — PANTOPRAZOLE SODIUM 40 MG: 40 INJECTION, POWDER, FOR SOLUTION INTRAVENOUS at 10:17

## 2021-01-01 RX ADMIN — MIDAZOLAM 5 MG/HR: 5 INJECTION INTRAMUSCULAR; INTRAVENOUS at 00:22

## 2021-01-01 RX ADMIN — CEFEPIME 2000 MG: 2 INJECTION, POWDER, FOR SOLUTION INTRAVENOUS at 06:49

## 2021-01-01 RX ADMIN — ATORVASTATIN CALCIUM 10 MG: 10 TABLET, FILM COATED ORAL at 09:33

## 2021-01-01 RX ADMIN — DEXAMETHASONE SODIUM PHOSPHATE 6 MG: 10 INJECTION INTRAMUSCULAR; INTRAVENOUS at 19:40

## 2021-01-01 RX ADMIN — GUAIFENESIN 200 MG: 200 SOLUTION ORAL at 01:10

## 2021-01-01 RX ADMIN — METOCLOPRAMIDE HYDROCHLORIDE 10 MG: 5 INJECTION INTRAMUSCULAR; INTRAVENOUS at 12:44

## 2021-01-01 RX ADMIN — SODIUM CHLORIDE, PRESERVATIVE FREE 10 ML: 5 INJECTION INTRAVENOUS at 20:27

## 2021-01-01 RX ADMIN — IPRATROPIUM BROMIDE 4 PUFF: 17 AEROSOL, METERED RESPIRATORY (INHALATION) at 08:44

## 2021-01-01 RX ADMIN — IPRATROPIUM BROMIDE 4 PUFF: 17 AEROSOL, METERED RESPIRATORY (INHALATION) at 11:45

## 2021-01-01 RX ADMIN — ATENOLOL 50 MG: 50 TABLET ORAL at 08:24

## 2021-01-01 RX ADMIN — ALBUTEROL SULFATE 4 PUFF: 90 AEROSOL, METERED RESPIRATORY (INHALATION) at 23:10

## 2021-01-01 RX ADMIN — METOCLOPRAMIDE HYDROCHLORIDE 10 MG: 5 INJECTION INTRAMUSCULAR; INTRAVENOUS at 04:32

## 2021-01-01 RX ADMIN — PROPOFOL 65 MCG/KG/MIN: 10 INJECTION, EMULSION INTRAVENOUS at 06:13

## 2021-01-01 RX ADMIN — IPRATROPIUM BROMIDE 4 PUFF: 17 AEROSOL, METERED RESPIRATORY (INHALATION) at 18:56

## 2021-01-01 RX ADMIN — IPRATROPIUM BROMIDE 4 PUFF: 17 AEROSOL, METERED RESPIRATORY (INHALATION) at 17:00

## 2021-01-01 RX ADMIN — METOCLOPRAMIDE HYDROCHLORIDE 10 MG: 5 INJECTION INTRAMUSCULAR; INTRAVENOUS at 21:09

## 2021-01-01 RX ADMIN — PROPOFOL 40 MCG/KG/MIN: 10 INJECTION, EMULSION INTRAVENOUS at 17:46

## 2021-01-01 RX ADMIN — METOCLOPRAMIDE HYDROCHLORIDE 10 MG: 5 INJECTION INTRAMUSCULAR; INTRAVENOUS at 14:05

## 2021-01-01 RX ADMIN — METOCLOPRAMIDE HYDROCHLORIDE 10 MG: 5 INJECTION INTRAMUSCULAR; INTRAVENOUS at 16:53

## 2021-01-01 RX ADMIN — TAMSULOSIN HYDROCHLORIDE 0.4 MG: 0.4 CAPSULE ORAL at 08:00

## 2021-01-01 RX ADMIN — METOCLOPRAMIDE HYDROCHLORIDE 10 MG: 5 INJECTION INTRAMUSCULAR; INTRAVENOUS at 13:26

## 2021-01-01 RX ADMIN — MIDAZOLAM 1 MG/HR: 5 INJECTION INTRAMUSCULAR; INTRAVENOUS at 22:23

## 2021-01-01 RX ADMIN — SODIUM CHLORIDE, PRESERVATIVE FREE 10 ML: 5 INJECTION INTRAVENOUS at 11:13

## 2021-01-01 RX ADMIN — PROPOFOL 65 MCG/KG/MIN: 10 INJECTION, EMULSION INTRAVENOUS at 01:38

## 2021-01-01 RX ADMIN — IPRATROPIUM BROMIDE 4 PUFF: 17 AEROSOL, METERED RESPIRATORY (INHALATION) at 19:37

## 2021-01-01 RX ADMIN — VASOPRESSIN 0.04 UNITS/MIN: 20 INJECTION INTRAVENOUS at 07:11

## 2021-01-01 RX ADMIN — ATENOLOL 50 MG: 50 TABLET ORAL at 08:36

## 2021-01-01 RX ADMIN — ENOXAPARIN SODIUM 30 MG: 100 INJECTION SUBCUTANEOUS at 21:15

## 2021-01-01 RX ADMIN — BENZONATATE 100 MG: 100 CAPSULE ORAL at 01:10

## 2021-01-01 RX ADMIN — PROPOFOL 35 MCG/KG/MIN: 10 INJECTION, EMULSION INTRAVENOUS at 04:14

## 2021-01-01 RX ADMIN — PROPOFOL 65 MCG/KG/MIN: 10 INJECTION, EMULSION INTRAVENOUS at 06:53

## 2021-01-01 RX ADMIN — DEXAMETHASONE SODIUM PHOSPHATE 6 MG: 10 INJECTION INTRAMUSCULAR; INTRAVENOUS at 10:06

## 2021-01-01 RX ADMIN — ATORVASTATIN CALCIUM 10 MG: 10 TABLET, FILM COATED ORAL at 10:17

## 2021-01-01 RX ADMIN — ATENOLOL 50 MG: 50 TABLET ORAL at 08:59

## 2021-01-01 RX ADMIN — SODIUM CHLORIDE, PRESERVATIVE FREE 10 ML: 5 INJECTION INTRAVENOUS at 08:49

## 2021-01-01 RX ADMIN — Medication 175 MCG/HR: at 02:10

## 2021-01-01 RX ADMIN — ENOXAPARIN SODIUM 30 MG: 100 INJECTION SUBCUTANEOUS at 08:29

## 2021-01-01 RX ADMIN — PANTOPRAZOLE SODIUM 40 MG: 40 INJECTION, POWDER, FOR SOLUTION INTRAVENOUS at 09:58

## 2021-01-01 RX ADMIN — PANTOPRAZOLE SODIUM 40 MG: 40 INJECTION, POWDER, FOR SOLUTION INTRAVENOUS at 08:04

## 2021-01-01 RX ADMIN — METHYLPREDNISOLONE SODIUM SUCCINATE 70 MG: 125 INJECTION, POWDER, FOR SOLUTION INTRAMUSCULAR; INTRAVENOUS at 08:06

## 2021-01-01 RX ADMIN — ATENOLOL 50 MG: 50 TABLET ORAL at 09:40

## 2021-01-01 RX ADMIN — ATORVASTATIN CALCIUM 10 MG: 10 TABLET, FILM COATED ORAL at 07:41

## 2021-01-01 RX ADMIN — IPRATROPIUM BROMIDE 4 PUFF: 17 AEROSOL, METERED RESPIRATORY (INHALATION) at 19:44

## 2021-01-01 RX ADMIN — ALBUTEROL SULFATE 4 PUFF: 90 AEROSOL, METERED RESPIRATORY (INHALATION) at 12:42

## 2021-01-01 RX ADMIN — IPRATROPIUM BROMIDE 4 PUFF: 17 AEROSOL, METERED RESPIRATORY (INHALATION) at 19:52

## 2021-01-01 RX ADMIN — MIDAZOLAM 5 MG/HR: 5 INJECTION INTRAMUSCULAR; INTRAVENOUS at 07:36

## 2021-01-01 RX ADMIN — ENOXAPARIN SODIUM 30 MG: 100 INJECTION SUBCUTANEOUS at 08:28

## 2021-01-01 RX ADMIN — IPRATROPIUM BROMIDE 4 PUFF: 17 AEROSOL, METERED RESPIRATORY (INHALATION) at 09:50

## 2021-01-01 RX ADMIN — DILTIAZEM HYDROCHLORIDE 10 MG: 5 INJECTION INTRAVENOUS at 08:33

## 2021-01-01 RX ADMIN — Medication 125 MCG/HR: at 12:40

## 2021-01-01 RX ADMIN — SODIUM CHLORIDE, PRESERVATIVE FREE 10 ML: 5 INJECTION INTRAVENOUS at 19:51

## 2021-01-01 RX ADMIN — ENOXAPARIN SODIUM 30 MG: 100 INJECTION SUBCUTANEOUS at 21:08

## 2021-01-01 RX ADMIN — MIDAZOLAM 10 MG/HR: 5 INJECTION INTRAMUSCULAR; INTRAVENOUS at 23:38

## 2021-01-01 RX ADMIN — MINOCYCLINE HYDROCHLORIDE 100 MG: 100 CAPSULE ORAL at 07:41

## 2021-01-01 RX ADMIN — TAMSULOSIN HYDROCHLORIDE 0.4 MG: 0.4 CAPSULE ORAL at 09:58

## 2021-01-01 RX ADMIN — METOCLOPRAMIDE HYDROCHLORIDE 10 MG: 5 INJECTION INTRAMUSCULAR; INTRAVENOUS at 04:22

## 2021-01-01 RX ADMIN — SODIUM CHLORIDE, PRESERVATIVE FREE 10 ML: 5 INJECTION INTRAVENOUS at 21:54

## 2021-01-01 RX ADMIN — METOCLOPRAMIDE HYDROCHLORIDE 10 MG: 5 INJECTION INTRAMUSCULAR; INTRAVENOUS at 04:19

## 2021-01-01 RX ADMIN — Medication 175 MCG/HR: at 08:24

## 2021-01-01 RX ADMIN — PROPOFOL 20 MCG/KG/MIN: 10 INJECTION, EMULSION INTRAVENOUS at 13:00

## 2021-01-01 RX ADMIN — Medication 200 MCG/HR: at 10:41

## 2021-01-01 RX ADMIN — ONDANSETRON 4 MG: 2 INJECTION INTRAMUSCULAR; INTRAVENOUS at 19:49

## 2021-01-01 RX ADMIN — PHENYLEPHRINE HYDROCHLORIDE 155 MCG/MIN: 10 INJECTION INTRAVENOUS at 06:29

## 2021-01-01 RX ADMIN — CEFEPIME 2000 MG: 2 INJECTION, POWDER, FOR SOLUTION INTRAVENOUS at 10:11

## 2021-01-01 RX ADMIN — ATENOLOL 50 MG: 50 TABLET ORAL at 08:43

## 2021-01-01 RX ADMIN — FUROSEMIDE 60 MG: 10 INJECTION, SOLUTION INTRAVENOUS at 10:34

## 2021-01-01 RX ADMIN — SODIUM CHLORIDE, PRESERVATIVE FREE 10 ML: 5 INJECTION INTRAVENOUS at 09:10

## 2021-01-01 RX ADMIN — Medication 37 MCG/MIN: at 04:21

## 2021-01-01 RX ADMIN — SODIUM CHLORIDE, PRESERVATIVE FREE 10 ML: 5 INJECTION INTRAVENOUS at 09:32

## 2021-01-01 RX ADMIN — VASOPRESSIN 0.04 UNITS/MIN: 20 INJECTION INTRAVENOUS at 08:42

## 2021-01-01 RX ADMIN — ONDANSETRON 4 MG: 2 INJECTION INTRAMUSCULAR; INTRAVENOUS at 04:11

## 2021-01-01 RX ADMIN — LOSARTAN POTASSIUM 100 MG: 50 TABLET, FILM COATED ORAL at 08:36

## 2021-01-01 RX ADMIN — IPRATROPIUM BROMIDE 4 PUFF: 17 AEROSOL, METERED RESPIRATORY (INHALATION) at 16:28

## 2021-01-01 RX ADMIN — IPRATROPIUM BROMIDE 4 PUFF: 17 AEROSOL, METERED RESPIRATORY (INHALATION) at 20:02

## 2021-01-01 RX ADMIN — ENOXAPARIN SODIUM 30 MG: 100 INJECTION SUBCUTANEOUS at 08:37

## 2021-01-01 RX ADMIN — Medication 175 MCG/HR: at 14:11

## 2021-01-01 RX ADMIN — ATORVASTATIN CALCIUM 10 MG: 10 TABLET, FILM COATED ORAL at 08:35

## 2021-01-01 RX ADMIN — ACETAMINOPHEN 650 MG: 325 TABLET ORAL at 04:11

## 2021-01-01 RX ADMIN — METOCLOPRAMIDE HYDROCHLORIDE 10 MG: 5 INJECTION INTRAMUSCULAR; INTRAVENOUS at 04:54

## 2021-01-01 RX ADMIN — IPRATROPIUM BROMIDE 4 PUFF: 17 AEROSOL, METERED RESPIRATORY (INHALATION) at 16:51

## 2021-01-01 RX ADMIN — ALBUMIN (HUMAN) 12.5 G: 0.25 INJECTION, SOLUTION INTRAVENOUS at 13:43

## 2021-01-01 RX ADMIN — ATENOLOL 50 MG: 50 TABLET ORAL at 20:29

## 2021-01-01 RX ADMIN — SODIUM CHLORIDE, PRESERVATIVE FREE 10 ML: 5 INJECTION INTRAVENOUS at 08:29

## 2021-01-01 RX ADMIN — MIDAZOLAM 7 MG/HR: 5 INJECTION INTRAMUSCULAR; INTRAVENOUS at 17:50

## 2021-01-01 RX ADMIN — CEFTAZIDIME, AVIBACTAM 2.5 G: 2; .5 POWDER, FOR SOLUTION INTRAVENOUS at 06:32

## 2021-01-01 RX ADMIN — IPRATROPIUM BROMIDE 4 PUFF: 17 AEROSOL, METERED RESPIRATORY (INHALATION) at 08:54

## 2021-01-01 RX ADMIN — IPRATROPIUM BROMIDE 4 PUFF: 17 AEROSOL, METERED RESPIRATORY (INHALATION) at 15:55

## 2021-01-01 RX ADMIN — ALBUTEROL SULFATE 2 PUFF: 90 AEROSOL, METERED RESPIRATORY (INHALATION) at 08:40

## 2021-01-01 RX ADMIN — ENOXAPARIN SODIUM 30 MG: 100 INJECTION SUBCUTANEOUS at 08:22

## 2021-01-01 RX ADMIN — Medication 50 MCG/HR: at 07:24

## 2021-01-01 RX ADMIN — SODIUM CHLORIDE: 9 INJECTION, SOLUTION INTRAVENOUS at 07:30

## 2021-01-01 RX ADMIN — SODIUM CHLORIDE 500 ML: 9 INJECTION, SOLUTION INTRAVENOUS at 12:06

## 2021-01-01 RX ADMIN — ALBUTEROL SULFATE 4 PUFF: 90 AEROSOL, METERED RESPIRATORY (INHALATION) at 03:47

## 2021-01-01 RX ADMIN — ATENOLOL 50 MG: 50 TABLET ORAL at 23:08

## 2021-01-01 RX ADMIN — IPRATROPIUM BROMIDE 4 PUFF: 17 AEROSOL, METERED RESPIRATORY (INHALATION) at 12:25

## 2021-01-01 RX ADMIN — SODIUM CHLORIDE, PRESERVATIVE FREE 10 ML: 5 INJECTION INTRAVENOUS at 08:20

## 2021-01-01 RX ADMIN — IPRATROPIUM BROMIDE 4 PUFF: 17 AEROSOL, METERED RESPIRATORY (INHALATION) at 19:31

## 2021-01-01 RX ADMIN — CEFEPIME 2000 MG: 2 INJECTION, POWDER, FOR SOLUTION INTRAVENOUS at 17:43

## 2021-01-01 RX ADMIN — SODIUM CHLORIDE, PRESERVATIVE FREE 10 ML: 5 INJECTION INTRAVENOUS at 21:27

## 2021-01-01 RX ADMIN — ENOXAPARIN SODIUM 30 MG: 100 INJECTION SUBCUTANEOUS at 20:39

## 2021-01-01 RX ADMIN — METOCLOPRAMIDE HYDROCHLORIDE 10 MG: 5 INJECTION INTRAMUSCULAR; INTRAVENOUS at 12:30

## 2021-01-01 RX ADMIN — PANTOPRAZOLE SODIUM 40 MG: 40 INJECTION, POWDER, FOR SOLUTION INTRAVENOUS at 14:22

## 2021-01-01 RX ADMIN — IOPAMIDOL 75 ML: 755 INJECTION, SOLUTION INTRAVENOUS at 01:58

## 2021-01-01 RX ADMIN — IPRATROPIUM BROMIDE 4 PUFF: 17 AEROSOL, METERED RESPIRATORY (INHALATION) at 09:29

## 2021-01-01 RX ADMIN — ONDANSETRON 4 MG: 2 INJECTION INTRAMUSCULAR; INTRAVENOUS at 18:05

## 2021-01-01 RX ADMIN — ATORVASTATIN CALCIUM 10 MG: 10 TABLET, FILM COATED ORAL at 11:05

## 2021-01-01 RX ADMIN — ALBUTEROL SULFATE 4 PUFF: 90 AEROSOL, METERED RESPIRATORY (INHALATION) at 03:39

## 2021-01-01 RX ADMIN — SODIUM CHLORIDE, PRESERVATIVE FREE 10 ML: 5 INJECTION INTRAVENOUS at 08:16

## 2021-01-01 RX ADMIN — PROPOFOL 65 MCG/KG/MIN: 10 INJECTION, EMULSION INTRAVENOUS at 11:47

## 2021-01-01 RX ADMIN — PHENYLEPHRINE HYDROCHLORIDE 200 MCG/MIN: 10 INJECTION INTRAVENOUS at 10:55

## 2021-01-01 RX ADMIN — Medication 200 MCG/HR: at 06:23

## 2021-01-01 RX ADMIN — TAMSULOSIN HYDROCHLORIDE 0.4 MG: 0.4 CAPSULE ORAL at 09:10

## 2021-01-01 RX ADMIN — METHYLPREDNISOLONE SODIUM SUCCINATE 70 MG: 125 INJECTION, POWDER, FOR SOLUTION INTRAMUSCULAR; INTRAVENOUS at 08:35

## 2021-01-01 RX ADMIN — METOCLOPRAMIDE HYDROCHLORIDE 10 MG: 5 INJECTION INTRAMUSCULAR; INTRAVENOUS at 21:04

## 2021-01-01 RX ADMIN — ALBUTEROL SULFATE 4 PUFF: 90 AEROSOL, METERED RESPIRATORY (INHALATION) at 19:51

## 2021-01-01 RX ADMIN — ALBUTEROL SULFATE 4 PUFF: 90 AEROSOL, METERED RESPIRATORY (INHALATION) at 08:54

## 2021-01-01 RX ADMIN — CEFTAZIDIME, AVIBACTAM 2.5 G: 2; .5 POWDER, FOR SOLUTION INTRAVENOUS at 16:10

## 2021-01-01 RX ADMIN — IPRATROPIUM BROMIDE 4 PUFF: 17 AEROSOL, METERED RESPIRATORY (INHALATION) at 21:03

## 2021-01-01 RX ADMIN — MINOCYCLINE HYDROCHLORIDE 100 MG: 100 CAPSULE ORAL at 10:05

## 2021-01-01 RX ADMIN — METOCLOPRAMIDE HYDROCHLORIDE 10 MG: 5 INJECTION INTRAMUSCULAR; INTRAVENOUS at 19:40

## 2021-01-01 RX ADMIN — SODIUM CHLORIDE, PRESERVATIVE FREE 10 ML: 5 INJECTION INTRAVENOUS at 20:04

## 2021-01-01 RX ADMIN — Medication 150 MCG/HR: at 17:51

## 2021-01-01 RX ADMIN — METHYLPREDNISOLONE SODIUM SUCCINATE 70 MG: 125 INJECTION, POWDER, FOR SOLUTION INTRAMUSCULAR; INTRAVENOUS at 09:11

## 2021-01-01 RX ADMIN — MIDAZOLAM 7 MG/HR: 5 INJECTION INTRAMUSCULAR; INTRAVENOUS at 01:48

## 2021-01-01 RX ADMIN — LOSARTAN POTASSIUM 100 MG: 50 TABLET, FILM COATED ORAL at 08:04

## 2021-01-01 RX ADMIN — METOCLOPRAMIDE HYDROCHLORIDE 10 MG: 5 INJECTION INTRAMUSCULAR; INTRAVENOUS at 05:37

## 2021-01-01 RX ADMIN — Medication 125 MCG/HR: at 02:31

## 2021-01-01 RX ADMIN — PROPOFOL 50 MCG/KG/MIN: 10 INJECTION, EMULSION INTRAVENOUS at 11:04

## 2021-01-01 RX ADMIN — ALBUMIN (HUMAN) 25 G: 0.25 INJECTION, SOLUTION INTRAVENOUS at 10:34

## 2021-01-01 RX ADMIN — ENOXAPARIN SODIUM 30 MG: 100 INJECTION SUBCUTANEOUS at 20:16

## 2021-01-01 RX ADMIN — SODIUM CHLORIDE: 9 INJECTION, SOLUTION INTRAVENOUS at 14:22

## 2021-01-01 RX ADMIN — ONDANSETRON 4 MG: 2 INJECTION INTRAMUSCULAR; INTRAVENOUS at 06:42

## 2021-01-01 RX ADMIN — ALBUTEROL SULFATE 4 PUFF: 90 AEROSOL, METERED RESPIRATORY (INHALATION) at 02:21

## 2021-01-01 RX ADMIN — Medication 200 MCG/HR: at 16:55

## 2021-01-01 RX ADMIN — ATORVASTATIN CALCIUM 10 MG: 10 TABLET, FILM COATED ORAL at 10:05

## 2021-01-01 RX ADMIN — LOSARTAN POTASSIUM 100 MG: 50 TABLET, FILM COATED ORAL at 07:58

## 2021-01-01 RX ADMIN — ACETAMINOPHEN 650 MG: 650 SUPPOSITORY RECTAL at 05:10

## 2021-01-01 RX ADMIN — TAMSULOSIN HYDROCHLORIDE 0.4 MG: 0.4 CAPSULE ORAL at 08:35

## 2021-01-01 RX ADMIN — PANTOPRAZOLE SODIUM 40 MG: 40 INJECTION, POWDER, FOR SOLUTION INTRAVENOUS at 08:05

## 2021-01-01 RX ADMIN — LOSARTAN POTASSIUM 100 MG: 50 TABLET, FILM COATED ORAL at 08:50

## 2021-01-01 RX ADMIN — PANTOPRAZOLE SODIUM 40 MG: 40 INJECTION, POWDER, FOR SOLUTION INTRAVENOUS at 08:29

## 2021-01-01 RX ADMIN — ACETAMINOPHEN 650 MG: 325 TABLET ORAL at 08:00

## 2021-01-01 RX ADMIN — PANTOPRAZOLE SODIUM 40 MG: 40 INJECTION, POWDER, FOR SOLUTION INTRAVENOUS at 08:37

## 2021-01-01 RX ADMIN — KETOROLAC TROMETHAMINE 15 MG: 30 INJECTION, SOLUTION INTRAMUSCULAR; INTRAVENOUS at 00:03

## 2021-01-01 RX ADMIN — IPRATROPIUM BROMIDE 4 PUFF: 17 AEROSOL, METERED RESPIRATORY (INHALATION) at 08:05

## 2021-01-01 RX ADMIN — ENOXAPARIN SODIUM 30 MG: 100 INJECTION SUBCUTANEOUS at 19:50

## 2021-01-01 RX ADMIN — Medication 200 MCG/HR: at 16:08

## 2021-01-01 RX ADMIN — ACETAMINOPHEN 650 MG: 325 TABLET ORAL at 03:07

## 2021-01-01 RX ADMIN — EPINEPHRINE 1 MG: 0.1 INJECTION, SOLUTION ENDOTRACHEAL; INTRACARDIAC; INTRAVENOUS at 11:18

## 2021-01-01 RX ADMIN — DEXAMETHASONE SODIUM PHOSPHATE 6 MG: 10 INJECTION INTRAMUSCULAR; INTRAVENOUS at 09:18

## 2021-01-01 RX ADMIN — ALBUTEROL SULFATE 4 PUFF: 90 AEROSOL, METERED RESPIRATORY (INHALATION) at 00:18

## 2021-01-01 RX ADMIN — ATENOLOL 50 MG: 50 TABLET ORAL at 07:58

## 2021-01-01 RX ADMIN — ATENOLOL 50 MG: 50 TABLET ORAL at 08:05

## 2021-01-01 RX ADMIN — ALBUMIN (HUMAN) 25 G: 0.25 INJECTION, SOLUTION INTRAVENOUS at 17:47

## 2021-01-01 RX ADMIN — SODIUM CHLORIDE, PRESERVATIVE FREE 10 ML: 5 INJECTION INTRAVENOUS at 21:09

## 2021-01-01 RX ADMIN — PROPOFOL 70 MCG/KG/MIN: 10 INJECTION, EMULSION INTRAVENOUS at 11:43

## 2021-01-01 RX ADMIN — MINOCYCLINE HYDROCHLORIDE 100 MG: 100 CAPSULE ORAL at 20:19

## 2021-01-01 RX ADMIN — PANTOPRAZOLE SODIUM 40 MG: 40 INJECTION, POWDER, FOR SOLUTION INTRAVENOUS at 08:06

## 2021-01-01 RX ADMIN — IPRATROPIUM BROMIDE 4 PUFF: 17 AEROSOL, METERED RESPIRATORY (INHALATION) at 07:07

## 2021-01-01 RX ADMIN — METOCLOPRAMIDE HYDROCHLORIDE 10 MG: 5 INJECTION INTRAMUSCULAR; INTRAVENOUS at 19:51

## 2021-01-01 RX ADMIN — TAMSULOSIN HYDROCHLORIDE 0.4 MG: 0.4 CAPSULE ORAL at 08:37

## 2021-01-01 RX ADMIN — PROPOFOL 65 MCG/KG/MIN: 10 INJECTION, EMULSION INTRAVENOUS at 14:40

## 2021-01-01 RX ADMIN — ATENOLOL 50 MG: 50 TABLET ORAL at 21:10

## 2021-01-01 RX ADMIN — ALBUTEROL SULFATE 2 PUFF: 90 AEROSOL, METERED RESPIRATORY (INHALATION) at 16:18

## 2021-01-01 RX ADMIN — ALBUTEROL SULFATE 4 PUFF: 90 AEROSOL, METERED RESPIRATORY (INHALATION) at 19:59

## 2021-01-01 RX ADMIN — SODIUM CHLORIDE, PRESERVATIVE FREE 10 ML: 5 INJECTION INTRAVENOUS at 09:36

## 2021-01-01 RX ADMIN — MINOCYCLINE HYDROCHLORIDE 100 MG: 100 CAPSULE ORAL at 20:06

## 2021-01-01 RX ADMIN — CEFEPIME 2000 MG: 2 INJECTION, POWDER, FOR SOLUTION INTRAVENOUS at 07:17

## 2021-01-01 RX ADMIN — Medication 37 MCG/MIN: at 20:32

## 2021-01-01 RX ADMIN — CEFTAZIDIME, AVIBACTAM 2.5 G: 2; .5 POWDER, FOR SOLUTION INTRAVENOUS at 21:35

## 2021-01-01 RX ADMIN — ATORVASTATIN CALCIUM 10 MG: 10 TABLET, FILM COATED ORAL at 10:37

## 2021-01-01 RX ADMIN — LORAZEPAM 0.5 MG: 2 INJECTION INTRAMUSCULAR; INTRAVENOUS at 21:26

## 2021-01-01 RX ADMIN — Medication 15 MCG/MIN: at 11:43

## 2021-01-01 RX ADMIN — METOCLOPRAMIDE HYDROCHLORIDE 10 MG: 5 INJECTION INTRAMUSCULAR; INTRAVENOUS at 14:36

## 2021-01-01 RX ADMIN — IPRATROPIUM BROMIDE 4 PUFF: 17 AEROSOL, METERED RESPIRATORY (INHALATION) at 13:32

## 2021-01-01 RX ADMIN — CEFEPIME 2000 MG: 2 INJECTION, POWDER, FOR SOLUTION INTRAVENOUS at 15:16

## 2021-01-01 RX ADMIN — CEFEPIME 2000 MG: 2 INJECTION, POWDER, FOR SOLUTION INTRAVENOUS at 15:24

## 2021-01-01 RX ADMIN — ACETAMINOPHEN 650 MG: 325 TABLET ORAL at 10:44

## 2021-01-01 RX ADMIN — ONDANSETRON 4 MG: 2 INJECTION INTRAMUSCULAR; INTRAVENOUS at 08:17

## 2021-01-01 RX ADMIN — IPRATROPIUM BROMIDE 4 PUFF: 17 AEROSOL, METERED RESPIRATORY (INHALATION) at 11:25

## 2021-01-01 RX ADMIN — ATENOLOL 50 MG: 50 TABLET ORAL at 20:15

## 2021-01-01 RX ADMIN — IPRATROPIUM BROMIDE 4 PUFF: 17 AEROSOL, METERED RESPIRATORY (INHALATION) at 09:11

## 2021-01-01 RX ADMIN — PANTOPRAZOLE SODIUM 40 MG: 40 INJECTION, POWDER, FOR SOLUTION INTRAVENOUS at 08:58

## 2021-01-01 RX ADMIN — METOCLOPRAMIDE HYDROCHLORIDE 10 MG: 5 INJECTION INTRAMUSCULAR; INTRAVENOUS at 12:32

## 2021-01-01 RX ADMIN — METOCLOPRAMIDE HYDROCHLORIDE 10 MG: 5 INJECTION INTRAMUSCULAR; INTRAVENOUS at 23:09

## 2021-01-01 RX ADMIN — PROPOFOL 50 MCG/KG/MIN: 10 INJECTION, EMULSION INTRAVENOUS at 07:27

## 2021-01-01 RX ADMIN — BARICITINIB 4 MG: 2 TABLET, FILM COATED ORAL at 09:39

## 2021-01-01 RX ADMIN — CEFTAZIDIME, AVIBACTAM 2.5 G: 2; .5 POWDER, FOR SOLUTION INTRAVENOUS at 14:04

## 2021-01-01 RX ADMIN — METOCLOPRAMIDE HYDROCHLORIDE 10 MG: 5 INJECTION INTRAMUSCULAR; INTRAVENOUS at 05:22

## 2021-01-01 RX ADMIN — METOCLOPRAMIDE HYDROCHLORIDE 10 MG: 5 INJECTION INTRAMUSCULAR; INTRAVENOUS at 20:47

## 2021-01-01 RX ADMIN — PHENYLEPHRINE HYDROCHLORIDE 145 MCG/MIN: 10 INJECTION INTRAVENOUS at 19:00

## 2021-01-01 RX ADMIN — LOSARTAN POTASSIUM 100 MG: 50 TABLET, FILM COATED ORAL at 08:29

## 2021-01-01 RX ADMIN — METOCLOPRAMIDE HYDROCHLORIDE 10 MG: 5 INJECTION INTRAMUSCULAR; INTRAVENOUS at 12:48

## 2021-01-01 RX ADMIN — PROPOFOL 65 MCG/KG/MIN: 10 INJECTION, EMULSION INTRAVENOUS at 00:40

## 2021-01-01 RX ADMIN — SODIUM CHLORIDE, PRESERVATIVE FREE 10 ML: 5 INJECTION INTRAVENOUS at 09:18

## 2021-01-01 RX ADMIN — IPRATROPIUM BROMIDE 4 PUFF: 17 AEROSOL, METERED RESPIRATORY (INHALATION) at 19:58

## 2021-01-01 RX ADMIN — Medication 175 MCG/HR: at 07:30

## 2021-01-01 RX ADMIN — ALBUTEROL SULFATE 4 PUFF: 90 AEROSOL, METERED RESPIRATORY (INHALATION) at 09:11

## 2021-01-01 RX ADMIN — METOCLOPRAMIDE HYDROCHLORIDE 10 MG: 5 INJECTION INTRAMUSCULAR; INTRAVENOUS at 05:29

## 2021-01-01 RX ADMIN — ATENOLOL 50 MG: 50 TABLET ORAL at 08:50

## 2021-01-01 RX ADMIN — PANTOPRAZOLE SODIUM 40 MG: 40 INJECTION, POWDER, FOR SOLUTION INTRAVENOUS at 07:58

## 2021-01-01 RX ADMIN — ATORVASTATIN CALCIUM 10 MG: 10 TABLET, FILM COATED ORAL at 09:11

## 2021-01-01 RX ADMIN — LORAZEPAM 1 MG: 2 INJECTION INTRAMUSCULAR; INTRAVENOUS at 08:33

## 2021-01-01 RX ADMIN — IPRATROPIUM BROMIDE 4 PUFF: 17 AEROSOL, METERED RESPIRATORY (INHALATION) at 08:18

## 2021-01-01 RX ADMIN — METOCLOPRAMIDE HYDROCHLORIDE 10 MG: 5 INJECTION INTRAMUSCULAR; INTRAVENOUS at 12:55

## 2021-01-01 RX ADMIN — Medication 200 MCG/HR: at 22:56

## 2021-01-01 RX ADMIN — PANTOPRAZOLE SODIUM 40 MG: 40 INJECTION, POWDER, FOR SOLUTION INTRAVENOUS at 08:28

## 2021-01-01 RX ADMIN — METOCLOPRAMIDE HYDROCHLORIDE 10 MG: 5 INJECTION INTRAMUSCULAR; INTRAVENOUS at 21:15

## 2021-01-01 RX ADMIN — CEFEPIME 2000 MG: 2 INJECTION, POWDER, FOR SOLUTION INTRAVENOUS at 14:49

## 2021-01-01 RX ADMIN — PHENYLEPHRINE HYDROCHLORIDE 300 MCG/MIN: 10 INJECTION INTRAVENOUS at 07:38

## 2021-01-01 RX ADMIN — PROPOFOL 20 MCG/KG/MIN: 10 INJECTION, EMULSION INTRAVENOUS at 14:43

## 2021-01-01 RX ADMIN — ENOXAPARIN SODIUM 30 MG: 100 INJECTION SUBCUTANEOUS at 21:09

## 2021-01-01 RX ADMIN — CEFTAZIDIME, AVIBACTAM 2.5 G: 2; .5 POWDER, FOR SOLUTION INTRAVENOUS at 05:37

## 2021-01-01 RX ADMIN — ENOXAPARIN SODIUM 30 MG: 100 INJECTION SUBCUTANEOUS at 09:18

## 2021-01-01 RX ADMIN — METOCLOPRAMIDE HYDROCHLORIDE 10 MG: 5 INJECTION INTRAMUSCULAR; INTRAVENOUS at 12:10

## 2021-01-01 RX ADMIN — PANTOPRAZOLE SODIUM 40 MG: 40 INJECTION, POWDER, FOR SOLUTION INTRAVENOUS at 08:43

## 2021-01-01 RX ADMIN — METOCLOPRAMIDE HYDROCHLORIDE 10 MG: 5 INJECTION INTRAMUSCULAR; INTRAVENOUS at 20:23

## 2021-01-01 RX ADMIN — VANCOMYCIN 1250 MG: 1.75 INJECTION, SOLUTION INTRAVENOUS at 22:05

## 2021-01-01 RX ADMIN — ENOXAPARIN SODIUM 30 MG: 100 INJECTION SUBCUTANEOUS at 07:42

## 2021-01-01 RX ADMIN — ENOXAPARIN SODIUM 30 MG: 100 INJECTION SUBCUTANEOUS at 21:49

## 2021-01-01 RX ADMIN — PROPOFOL 60 MCG/KG/MIN: 10 INJECTION, EMULSION INTRAVENOUS at 09:56

## 2021-01-01 RX ADMIN — SODIUM CHLORIDE: 9 INJECTION, SOLUTION INTRAVENOUS at 03:32

## 2021-01-01 RX ADMIN — PROPOFOL 20 MCG/KG/MIN: 10 INJECTION, EMULSION INTRAVENOUS at 22:49

## 2021-01-01 RX ADMIN — METOCLOPRAMIDE HYDROCHLORIDE 10 MG: 5 INJECTION INTRAMUSCULAR; INTRAVENOUS at 21:26

## 2021-01-01 RX ADMIN — ATENOLOL 50 MG: 50 TABLET ORAL at 21:15

## 2021-01-01 RX ADMIN — SODIUM CHLORIDE, PRESERVATIVE FREE 10 ML: 5 INJECTION INTRAVENOUS at 10:28

## 2021-01-01 RX ADMIN — ALPRAZOLAM 0.25 MG: 0.25 TABLET ORAL at 15:43

## 2021-01-01 RX ADMIN — MINOCYCLINE HYDROCHLORIDE 100 MG: 100 CAPSULE ORAL at 21:08

## 2021-01-01 RX ADMIN — ATENOLOL 50 MG: 50 TABLET ORAL at 21:11

## 2021-01-01 RX ADMIN — Medication 150 MCG/HR: at 11:04

## 2021-01-01 RX ADMIN — PANTOPRAZOLE SODIUM 40 MG: 40 INJECTION, POWDER, FOR SOLUTION INTRAVENOUS at 10:05

## 2021-01-01 RX ADMIN — Medication 150 MCG/HR: at 21:47

## 2021-01-01 RX ADMIN — ACETAMINOPHEN 650 MG: 325 TABLET ORAL at 18:04

## 2021-01-01 RX ADMIN — CEFEPIME 2000 MG: 2 INJECTION, POWDER, FOR SOLUTION INTRAVENOUS at 09:19

## 2021-01-01 RX ADMIN — PHENYLEPHRINE HYDROCHLORIDE 205 MCG/MIN: 10 INJECTION INTRAVENOUS at 01:31

## 2021-01-01 RX ADMIN — CEFTAZIDIME, AVIBACTAM 2.5 G: 2; .5 POWDER, FOR SOLUTION INTRAVENOUS at 22:37

## 2021-01-01 RX ADMIN — METOCLOPRAMIDE HYDROCHLORIDE 10 MG: 5 INJECTION INTRAMUSCULAR; INTRAVENOUS at 05:33

## 2021-01-01 RX ADMIN — TAMSULOSIN HYDROCHLORIDE 0.4 MG: 0.4 CAPSULE ORAL at 09:40

## 2021-01-01 RX ADMIN — ACETAMINOPHEN 650 MG: 325 TABLET ORAL at 11:34

## 2021-01-01 RX ADMIN — DEXAMETHASONE SODIUM PHOSPHATE 6 MG: 10 INJECTION INTRAMUSCULAR; INTRAVENOUS at 11:05

## 2021-01-01 RX ADMIN — LOSARTAN POTASSIUM 100 MG: 50 TABLET, FILM COATED ORAL at 09:40

## 2021-01-01 RX ADMIN — METOCLOPRAMIDE HYDROCHLORIDE 10 MG: 5 INJECTION INTRAMUSCULAR; INTRAVENOUS at 14:21

## 2021-01-01 RX ADMIN — Medication 200 MCG/HR: at 11:38

## 2021-01-01 RX ADMIN — PROPOFOL 10 MCG/KG/MIN: 10 INJECTION, EMULSION INTRAVENOUS at 11:32

## 2021-01-01 ASSESSMENT — PAIN SCALES - GENERAL
PAINLEVEL_OUTOF10: 0
PAINLEVEL_OUTOF10: 10
PAINLEVEL_OUTOF10: 6
PAINLEVEL_OUTOF10: 0
PAINLEVEL_OUTOF10: 5
PAINLEVEL_OUTOF10: 0
PAINLEVEL_OUTOF10: 2
PAINLEVEL_OUTOF10: 0
PAINLEVEL_OUTOF10: 0
PAINLEVEL_OUTOF10: 2
PAINLEVEL_OUTOF10: 0
PAINLEVEL_OUTOF10: 1
PAINLEVEL_OUTOF10: 0
PAINLEVEL_OUTOF10: 5
PAINLEVEL_OUTOF10: 0
PAINLEVEL_OUTOF10: 9
PAINLEVEL_OUTOF10: 0
PAINLEVEL_OUTOF10: 0
PAINLEVEL_OUTOF10: 4
PAINLEVEL_OUTOF10: 3
PAINLEVEL_OUTOF10: 0
PAINLEVEL_OUTOF10: 2
PAINLEVEL_OUTOF10: 0
PAINLEVEL_OUTOF10: 3
PAINLEVEL_OUTOF10: 0
PAINLEVEL_OUTOF10: 7
PAINLEVEL_OUTOF10: 0
PAINLEVEL_OUTOF10: 7
PAINLEVEL_OUTOF10: 3
PAINLEVEL_OUTOF10: 0
PAINLEVEL_OUTOF10: 5
PAINLEVEL_OUTOF10: 3
PAINLEVEL_OUTOF10: 0
PAINLEVEL_OUTOF10: 7
PAINLEVEL_OUTOF10: 0
PAINLEVEL_OUTOF10: 3
PAINLEVEL_OUTOF10: 0
PAINLEVEL_OUTOF10: 6
PAINLEVEL_OUTOF10: 0
PAINLEVEL_OUTOF10: 9
PAINLEVEL_OUTOF10: 0
PAINLEVEL_OUTOF10: 0

## 2021-01-01 ASSESSMENT — PAIN SCALES - WONG BAKER

## 2021-01-01 ASSESSMENT — PAIN DESCRIPTION - LOCATION
LOCATION: HEAD
LOCATION: ABDOMEN
LOCATION: HEAD

## 2021-01-01 ASSESSMENT — PULMONARY FUNCTION TESTS
PIF_VALUE: 49
PIF_VALUE: 48
PIF_VALUE: 58
PIF_VALUE: 39
PIF_VALUE: 38
PIF_VALUE: 48
PIF_VALUE: 50
PIF_VALUE: 54
PIF_VALUE: 43
PIF_VALUE: 50
PIF_VALUE: 50
PIF_VALUE: 39
PIF_VALUE: 35
PIF_VALUE: 45
PIF_VALUE: 39
PIF_VALUE: 50
PIF_VALUE: 49
PIF_VALUE: 39
PIF_VALUE: 47
PIF_VALUE: 49
PIF_VALUE: 53
PIF_VALUE: 54
PIF_VALUE: 41
PIF_VALUE: 49
PIF_VALUE: 58
PIF_VALUE: 50
PIF_VALUE: 50
PIF_VALUE: 53
PIF_VALUE: 44
PIF_VALUE: 41
PIF_VALUE: 54
PIF_VALUE: 40
PIF_VALUE: 39
PIF_VALUE: 44
PIF_VALUE: 48
PIF_VALUE: 47
PIF_VALUE: 54
PIF_VALUE: 48
PIF_VALUE: 41
PIF_VALUE: 50
PIF_VALUE: 54
PIF_VALUE: 54
PIF_VALUE: 48
PIF_VALUE: 49
PIF_VALUE: 49
PIF_VALUE: 47
PIF_VALUE: 58
PIF_VALUE: 39
PIF_VALUE: 50
PIF_VALUE: 41
PIF_VALUE: 41
PIF_VALUE: 48
PIF_VALUE: 44
PIF_VALUE: 38
PIF_VALUE: 47
PIF_VALUE: 50
PIF_VALUE: 47
PIF_VALUE: 50
PIF_VALUE: 41
PIF_VALUE: 47
PIF_VALUE: 39
PIF_VALUE: 41
PIF_VALUE: 59
PIF_VALUE: 42
PIF_VALUE: 49
PIF_VALUE: 54
PIF_VALUE: 50
PIF_VALUE: 41
PIF_VALUE: 45
PIF_VALUE: 50
PIF_VALUE: 39
PIF_VALUE: 47
PIF_VALUE: 48
PIF_VALUE: 41
PIF_VALUE: 48
PIF_VALUE: 54
PIF_VALUE: 45
PIF_VALUE: 54
PIF_VALUE: 48
PIF_VALUE: 50
PIF_VALUE: 42
PIF_VALUE: 38
PIF_VALUE: 49
PIF_VALUE: 49
PIF_VALUE: 50
PIF_VALUE: 59
PIF_VALUE: 50
PIF_VALUE: 47
PIF_VALUE: 54
PIF_VALUE: 47
PIF_VALUE: 50
PIF_VALUE: 47
PIF_VALUE: 48
PIF_VALUE: 49
PIF_VALUE: 54
PIF_VALUE: 35
PIF_VALUE: 39
PIF_VALUE: 50
PIF_VALUE: 49
PIF_VALUE: 45
PIF_VALUE: 49
PIF_VALUE: 47
PIF_VALUE: 39
PIF_VALUE: 39
PIF_VALUE: 50
PIF_VALUE: 48
PIF_VALUE: 47
PIF_VALUE: 48
PIF_VALUE: 39
PIF_VALUE: 50
PIF_VALUE: 47
PIF_VALUE: 54
PIF_VALUE: 39
PIF_VALUE: 41
PIF_VALUE: 49
PIF_VALUE: 50
PIF_VALUE: 36
PIF_VALUE: 58
PIF_VALUE: 42
PIF_VALUE: 54
PIF_VALUE: 54
PIF_VALUE: 39
PIF_VALUE: 45
PIF_VALUE: 45
PIF_VALUE: 50
PIF_VALUE: 54
PIF_VALUE: 48
PIF_VALUE: 49
PIF_VALUE: 58
PIF_VALUE: 48
PIF_VALUE: 50
PIF_VALUE: 47
PIF_VALUE: 58
PIF_VALUE: 49
PIF_VALUE: 49
PIF_VALUE: 50
PIF_VALUE: 42
PIF_VALUE: 50
PIF_VALUE: 47
PIF_VALUE: 50
PIF_VALUE: 49
PIF_VALUE: 41
PIF_VALUE: 39
PIF_VALUE: 41
PIF_VALUE: 54
PIF_VALUE: 49
PIF_VALUE: 35

## 2021-01-01 ASSESSMENT — PAIN DESCRIPTION - DESCRIPTORS
DESCRIPTORS: HEADACHE
DESCRIPTORS: SHARP
DESCRIPTORS: HEADACHE

## 2021-01-01 ASSESSMENT — PAIN DESCRIPTION - PROGRESSION
CLINICAL_PROGRESSION: NOT CHANGED

## 2021-01-01 ASSESSMENT — PAIN DESCRIPTION - PAIN TYPE
TYPE: ACUTE PAIN

## 2021-01-01 ASSESSMENT — ENCOUNTER SYMPTOMS
ABDOMINAL DISTENTION: 0
COUGH: 1
SHORTNESS OF BREATH: 1
WHEEZING: 1
ABDOMINAL PAIN: 0

## 2021-01-01 ASSESSMENT — PAIN DESCRIPTION - FREQUENCY: FREQUENCY: CONTINUOUS

## 2021-01-01 ASSESSMENT — PAIN DESCRIPTION - ORIENTATION: ORIENTATION: RIGHT;UPPER

## 2021-01-01 ASSESSMENT — PAIN - FUNCTIONAL ASSESSMENT: PAIN_FUNCTIONAL_ASSESSMENT: PREVENTS OR INTERFERES SOME ACTIVE ACTIVITIES AND ADLS

## 2021-01-01 ASSESSMENT — PAIN DESCRIPTION - ONSET: ONSET: GRADUAL

## 2021-10-27 PROBLEM — U07.1 COVID-19: Status: ACTIVE | Noted: 2021-01-01

## 2021-10-27 PROBLEM — J12.82 PNEUMONIA DUE TO COVID-19 VIRUS: Status: ACTIVE | Noted: 2021-01-01

## 2021-10-27 NOTE — H&P
HISTORY AND PHYSICAL  (Hospitalist, Internal Medicine)  IDENTIFYING INFORMATION   PATIENT:  Columba Agustin  MRN:  [id number]  ADMIT DATE: 10/26/2021  TIME OF EVALUATION: 10/27/2021 4:02 AM    CHIEF COMPLAINT     Shortness of breath  HISTORY OF PRESENT ILLNESS   Columba Agustin is a 78 y.o. male admitted for shortness of breath has been worsening in chest after the weekend, states he was diagnosed with COVID-19 however the weekend. He states he has been feeling sick since Thursday, he said he initially had fevers and chills just before the weekend, however has not had any fevers and chills since then. Currently does complain of dry cough. Pt otherwise has no complaints of CP, dizziness, N/V/C/D, abdominal pain, dysuria, joint pains, rash/boils, or headaches. PMH listed below:    PAST MEDICAL, SURGICAL, FAMILY, and SOCIAL HISTORY     Past Medical History:   Diagnosis Date    Hyperlipidemia     Hypertension      Past Surgical History:   Procedure Laterality Date    ANKLE SURGERY      HAND SURGERY      KNEE SURGERY       History reviewed. No pertinent family history. Family Hx of HTN  Family Hx as reviewed above, otherwise non-contributory  Social History     Socioeconomic History    Marital status: Legally      Spouse name: None    Number of children: None    Years of education: None    Highest education level: None   Occupational History    None   Tobacco Use    Smoking status: Never Smoker   Substance and Sexual Activity    Alcohol use: Yes     Comment: occasional    Drug use: No    Sexual activity: None   Other Topics Concern    None   Social History Narrative    None     Social Determinants of Health     Financial Resource Strain:     Difficulty of Paying Living Expenses:    Food Insecurity:     Worried About Running Out of Food in the Last Year:     Ran Out of Food in the Last Year:    Transportation Needs:     Lack of Transportation (Medical):      Lack of Transportation (Non-Medical):    Physical Activity:     Days of Exercise per Week:     Minutes of Exercise per Session:    Stress:     Feeling of Stress :    Social Connections:     Frequency of Communication with Friends and Family:     Frequency of Social Gatherings with Friends and Family:     Attends Alevism Services:     Active Member of Clubs or Organizations:     Attends Club or Organization Meetings:     Marital Status:    Intimate Partner Violence:     Fear of Current or Ex-Partner:     Emotionally Abused:     Physically Abused:     Sexually Abused:        MEDICATIONS   Medications Prior to Admission  Not in a hospital admission. Current Medications  Current Facility-Administered Medications   Medication Dose Route Frequency Provider Last Rate Last Admin    sodium chloride flush 0.9 % injection 5-40 mL  5-40 mL IntraVENous BID Desirae Palmer MD   10 mL at 10/27/21 0255     Current Outpatient Medications   Medication Sig Dispense Refill    lovastatin (MEVACOR) 10 MG tablet Take 10 mg by mouth nightly      losartan (COZAAR) 100 MG tablet Take 100 mg by mouth daily      atenolol (TENORMIN) 50 MG tablet Take 50 mg by mouth 2 times daily      HYDROcodone-acetaminophen (NORCO) 5-325 MG per tablet Take 1-2 tablets by mouth every 4 hours as needed for Pain 15 tablet 0         Allergies  Allergies   Allergen Reactions    Amoxicillin        REVIEW OF SYSTEMS   Within above limitations. 14 point review of systems reviewed. Pertinent positive or negative as per HPI or otherwise negative per 14 point systems review. PHYSICAL EXAM     Wt Readings from Last 3 Encounters:   10/26/21 250 lb (113.4 kg)   12/15/15 256 lb (116.1 kg)       Blood pressure 111/73, pulse 72, temperature 98.9 °F (37.2 °C), resp. rate 23, height 5' 9\" (1.753 m), weight 250 lb (113.4 kg), SpO2 94 %. General - AAO x 3  Psych - Appropriate affect/speech. No agitation  Eyes - Eye lids intact. No scleral icterus  ENT - Lips wnl.  External ear clear/dry/intact. No thyromegaly on inspection  Neuro - No gross peripheral or central neuro deficits on inspection  Heart - Sinus. RRR. S1 and S2 present. No added HS/murmurs appreciated. No elevated JVD appreciated  Lung - Adequate air entry b/l, No crackles/wheezes appreciated  GI - Soft. No guarding/rigidity. No hepatosplenomegaly/ascites. BS+   - No CVA/suprapubic tenderness or palpable bladder distension  Skin - Intact. No rash/petechiae/ecchymosis. Warm extremities  MSK - Joints with normal ROM.  No joint swellings    Lines/Drains/Airways/Wounds:  [unfilled]    LABS AND IMAGING   CBC  [unfilled]    Last 3 Hemoglobin  Lab Results   Component Value Date    HGB 15.3 10/27/2021    HGB 16.4 01/15/2016     Last 3 WBC/ANC  Lab Results   Component Value Date    WBC 11.0 10/27/2021     No components found for: GRNLOCTYABS  Last 3 Platelets  No results found for: PLATELET  Chemistry  [unfilled]  [unfilled]  No results found for: LDH  Coagulation Studies  No results found for: PTT, INR  Liver Function Studies  Lab Results   Component Value Date    ALT 19 10/27/2021    AST 32 10/27/2021    ALKPHOS 58 10/27/2021       Recent Imaging        Relevant labs and imaging reviewed    ASSESSMENT AND PLAN   Scar Chung is a 78 y.o. male p/w    Cough and shortness of breath likely d/t COVID  - CXR with infiltrates  - abx given in ED, c/w if procal elevated and pending hospital course  -COVID-19 testing and related labs: CRP, d-dimer, ferritin, fibrinogen, LDH, lactate, procalcitonin, viral panel  - sputum cuture, legionella antigen, strep pneumonia antigen  - decadron  - PRN O2 via NC  - pulmonary consult/ID if needed    Addend: at 5:30 am, started desaturating, placed on bipap    Hypertension/hyperlipidemia  -Continue home meds    Case d/w ED provider    DVT ppx: Lovenox  Code status: Full code    Zeb, Internal Medicine  10/27/2021 at 4:02 AM     Due to the immediate potential for life-threatening deterioration due to acute hypoxic respiratory failure less than 80%, requiring oxygen, due to COVID-19 pneumonia, I spent 75 minutes providing critical care. This time is excluding time spent performing procedures.

## 2021-10-27 NOTE — CARE COORDINATION
Chart reviewed. Dx with Covid 10/24 at [facility]. Patient is from home; appears independent PTA. He has a PCP and insurance that assist with Rx when needed. CM will follow for needs. Prateek Gleason RN     1210 Attempted to contact patient by phone; no answer. Will reattempt.  Prateek Gleason RN

## 2021-10-27 NOTE — PROGRESS NOTES
Pt admitted to room 2012 at this time. Admission skin assessment complete with [facility] RN. No areas of concern noted. Pt admitted with wallet and $105 cash, placed with personal belongings as requested.

## 2021-10-27 NOTE — ED NOTES
Pt desated to 85% on 6L nasal cannula. Pt placed on high flow and slowly came up to 97%.  RT placed pt on BIPAP and pt now sating at 98%     Gilles Hutson RN  10/27/21 0034

## 2021-10-27 NOTE — CONSULTS
Pulmonary Consult Note      Reason for Consult:  COVID PNA  Requesting Physician: Nicolas Lopez MD  Subjective:   CHIEF COMPLAINT :SOB     Patient Active Problem List    Diagnosis Date Noted    COVID-19 10/27/2021        HPI:                The patient is a 78 y.o. male with significant past medical history of Obesity, HTN, HLD  presents with complaints of SOB x 2 days. He has dry cough,low grade fever, no headaches, no n/v, no abd pain, no diarrhea, no dysuria. He is positive for COVID. His CTA showed no PE but has bilateral pneumonia. At this time he is getting Decadron, Inhalers. He is lying in the bed. He is in mild resp distress     Past Medical History:      Diagnosis Date    Hyperlipidemia     Hypertension       Past Surgical History:        Procedure Laterality Date    ANKLE SURGERY      HAND SURGERY      KNEE SURGERY       Current Medications:     atenolol  50 mg Oral BID    losartan  100 mg Oral Daily    atorvastatin  10 mg Oral Daily    sodium chloride flush  5-40 mL IntraVENous 2 times per day    enoxaparin  30 mg SubCUTAneous BID    dexamethasone  6 mg Oral Daily     Allergies:    Social History:    TOBACCO:   reports that he has never smoked. He does not have any smokeless tobacco history on file. ETOH:   reports current alcohol use. Patient currently lives independently    Family History:   History reviewed. No pertinent family history. REVIEW OF SYSTEMS:    CONSTITUTIONAL:  negative for fevers, chills, diaphoresis, activity change, appetite change, fatigue, night sweats and unexpected weight change.    EYES:  negative for blurred vision, eye discharge, visual disturbance and icterus  HEENT:  negative for hearing loss, tinnitus, ear drainage, sinus pressure, nasal congestion, epistaxis and snoring  RESPIRATORY:  See HPI  CARDIOVASCULAR:  negative for chest pain, palpitations, exertional chest pressure/discomfort, edema, syncope  GASTROINTESTINAL:  negative for nausea, vomiting, diarrhea, constipation, blood in stool and abdominal pain  GENITOURINARY:  negative for frequency, dysuria, urinary incontinence, decreased urine volume, and hematuria  HEMATOLOGIC/LYMPHATIC:  negative for easy bruising, bleeding and lymphadenopathy  ALLERGIC/IMMUNOLOGIC:  negative for recurrent infections, angioedema, anaphylaxis and drug reactions  ENDOCRINE:  negative for weight changes and diabetic symptoms including polyuria, polydipsia and polyphagia  MUSCULOSKELETAL:  negative for  pain, joint swelling, decreased range of motion and muscle weakness  NEUROLOGICAL:  negative for headaches, slurred speech, unilateral weakness  PSYCHIATRIC/BEHAVIORAL: negative for hallucinations, behavioral problems, confusion and agitation. Objective:   PHYSICAL EXAM:      VITALS:  BP (!) 152/68   Pulse 92   Temp 99.6 °F (37.6 °C) (Oral)   Resp (!) 34   Ht 5' 9\" (1.753 m)   Wt 242 lb 15.2 oz (110.2 kg)   SpO2 93%   BMI 35.88 kg/m²   24HR INTAKE/OUTPUT:  No intake or output data in the 24 hours ending 10/27/21 1052  CURRENT PULSE OXIMETRY:  SpO2: 93 %  24HR PULSE OXIMETRY RANGE:  SpO2  Av.3 %  Min: 74 %  Max: 100 %    CONSTITUTIONAL:  awake, alert, cooperative, no apparent distress, and appears stated age  NECK:  Supple, symmetrical, trachea midline, no adenopathy, thyroid symmetric, not enlarged and no tenderness, skin normal  LUNGS:  Occasional basal crackles  CARDIOVASCULAR:  normal S1 and S2, no edema and no JVD  ABDOMEN:  normal bowel sounds, non-distended and no masses palpated, and no tenderness to palpation. No hepatospleenomegaly  LYMPHADENOPATHY:  no axillary or supraclavicular adenopathy. No cervical adnenopathy  PSYCHIATRIC: Oriented to person place and time. No obvious depression or anxiety. MUSCULOSKELETAL: No obvious misalignment or effusion of the joints. No clubbing, cyanosis of the digits. SKIN:  normal skin color, texture, turgor and no redness, warmth, or swelling.  No palpable nodules    DATA:    Old records have been reviewed  CBC with Differential:    Lab Results   Component Value Date    WBC 11.0 10/27/2021    RBC 4.82 10/27/2021    HGB 15.3 10/27/2021    HCT 45.2 10/27/2021     10/27/2021    MCV 93.8 10/27/2021    MCH 31.7 10/27/2021    MCHC 33.8 10/27/2021    RDW 13.5 10/27/2021    SEGSPCT 90.5 10/27/2021    LYMPHOPCT 4.6 10/27/2021    MONOPCT 4.3 10/27/2021    BASOPCT 0.1 10/27/2021    MONOSABS 0.5 10/27/2021    LYMPHSABS 0.5 10/27/2021    EOSABS 0.0 10/27/2021    BASOSABS 0.0 10/27/2021    DIFFTYPE AUTOMATED DIFFERENTIAL 10/27/2021     BMP:    Lab Results   Component Value Date     10/27/2021    K 4.5 10/27/2021     10/27/2021    CO2 21 10/27/2021    BUN 30 10/27/2021    CREATININE 1.0 10/27/2021    CALCIUM 8.7 10/27/2021    GFRAA >60 10/27/2021    LABGLOM >60 10/27/2021    GLUCOSE 150 10/27/2021     Hepatic Function Panel:    Lab Results   Component Value Date    ALKPHOS 58 10/27/2021    ALT 19 10/27/2021    AST 32 10/27/2021    PROT 6.4 10/27/2021    BILITOT 0.7 10/27/2021     ABG:    Lab Results   Component Value Date    PGK4QNA 24.6 10/27/2021    TTJ9KFB 37.0 10/27/2021    PO2ART 67 10/27/2021       Cultures:   Pending      Radiology Review:     1. No pulmonary embolus. 2. Pulmonary opacities compatible with viral pneumonia. 3. Reactive mediastinal and hilar lymphadenopathy           Assessment/Plan       Patient Active Problem List    Diagnosis Date Noted    COVID-19 10/27/2021   Acute Hypoxic resp failure  Bilateral Pneumonia sec to COVID-19  Obesity    PLAN  1. Decadron  2. Insulin  3. Inhalers  4. Keep sats > 92%  5. ICS  6. OOB  7. Prone ventilation as tolerated  8. CXR in am  9.  C.w present management    Electronically signed by Karri Granados MD on 10/27/2021 at 10:52 AM

## 2021-10-27 NOTE — PROGRESS NOTES
Patient Alysa Aleman presented with shortness of breath and was diagnosed Covid pneumonia. Radiology shows bilateral infiltrates. He was admitted earlier this morning and history and physical on chart has been reviewed. Pulmonary notes have been reviewed. Continue current measures. Overall prognosis remains very very guarded.

## 2021-10-27 NOTE — ED PROVIDER NOTES
EMERGENCY DEPARTMENT ENCOUNTER      CHIEF COMPLAINT:   Shortness of breath  Cough    CRITICAL CARE NOTE:  There was a high probability of clinically significant life-threatening deterioration of the patient's condition requiring my urgent intervention. Total critical care time is 30 minutes  This includes vital sign monitoring, pulse oximetry monitoring, telemetry monitoring, clinical response to the IV medications, reviewing the nursing notes, consultation time, dictation/documetation time. (This time excludes time spent performing procedures). HPI: Chris Estrada is a 78 y.o. male who presents to the Emergency Department complaining of a cough and shortness of breath in the setting of known Covid. The patient states that the symptoms started last Thursday. He has had a nonproductive cough. He has become progressively more short of breath. The shortness of breath is constant. The cough is intermittent. The shortness of breath is worse with exertion and better with rest. The patient denies tobacco use. He does not use oxygen at home. There is no known history of DVT or PE. The patient denies leg pain or leg swelling. He denies a history of lung disease. The patient denies fevers, chills, neck pain, chest pain, hemoptysis, abdominal pain, nausea, vomiting, numbness, tingling, weakness, or any other complaints. REVIEW OF SYSTEMS:  CONSTITUTIONAL:  Denies fever, chills, weight loss or weakness  EYES:  Denies photophobia or discharge  ENT:  Denies sore throat or ear pain  CARDIOVASCULAR: Denies chest pain or palpitations  RESPIRATORY:  Complains of cough and shortness of breath  GI:  Denies abdominal pain, nausea, vomiting, or diarrhea  MUSCULOSKELETAL:  Denies back pain  SKIN:  No rash  NEUROLOGIC:  Denies headache, focal weakness or sensory changes  All systems negative except as marked. \"Remaining review of systems reviewed and negative.  I have reviewed the nursing triage documentation and agree unless otherwise noted below. \"      PAST MEDICAL HISTORY:   Past Medical History:   Diagnosis Date    Hyperlipidemia     Hypertension        CURRENT MEDICATIONS:   Home medications reviewed. SURGICAL HISTORY:   Past Surgical History:   Procedure Laterality Date    ANKLE SURGERY      HAND SURGERY      KNEE SURGERY         FAMILY HISTORY:   History reviewed. No pertinent family history. SOCIAL HISTORY:   Social History     Socioeconomic History    Marital status: Legally      Spouse name: Not on file    Number of children: Not on file    Years of education: Not on file    Highest education level: Not on file   Occupational History    Not on file   Tobacco Use    Smoking status: Never Smoker   Substance and Sexual Activity    Alcohol use: Yes     Comment: occasional    Drug use: No    Sexual activity: Not on file   Other Topics Concern    Not on file   Social History Narrative    Not on file     Social Determinants of Health     Financial Resource Strain:     Difficulty of Paying Living Expenses:    Food Insecurity:     Worried About Running Out of Food in the Last Year:     [address] in the Last Year:    Transportation Needs:     Lack of Transportation (Medical):  Lack of Transportation (Non-Medical):    Physical Activity:     Days of Exercise per Week:     Minutes of Exercise per Session:    Stress:     Feeling of Stress :    Social Connections:     Frequency of Communication with Friends and Family:     Frequency of Social Gatherings with Friends and Family:     Attends Jain Services:     Active Member of Clubs or Organizations:     Attends Club or Organization Meetings:     Marital Status:    Intimate Partner Violence:     Fear of Current or Ex-Partner:     Emotionally Abused:     Physically Abused:     Sexually Abused:         ALLERGIES: Amoxicillin    PHYSICAL EXAM:  VITAL SIGNS:   ED Triage Vitals   Enc Vitals Group      BP 10/26/21 2345 132/75 Pulse 10/26/21 2330 78      Resp 10/26/21 2330 24      Temp 10/26/21 2345 98.9 °F (37.2 °C)      Temp src --       SpO2 10/26/21 2330 (!) 79 %      Weight 10/26/21 2330 250 lb (113.4 kg)      Height 10/26/21 2330 5' 9\" (1.753 m)      Head Circumference --       Peak Flow --       Pain Score --       Pain Loc --       Pain Edu? --       Excl. in [address]? --      Constitutional:  Non-toxic appearance  HENT: Normocephalic, Atraumatic  Eyes:  PERRL, Conjunctiva normal, No discharge. Neck: Normal range of motion, No tenderness, Supple, No stridor, No lymphadenopathy. Cardiovascular:  Normal heart rate, Normal rhythm  Pulmonary/Chest: Lungs are diminished to auscultation bilaterally, mild respiratory distress, hypoxic  Abdomen: Bowel sounds normal, Soft, No tenderness, No masses, No pulsatile masses  Back:  No tenderness, No CVA tenderness  Extremities:  Normal range of motion, Intact distal pulses, No edema, No tenderness  Skin:  Warm, Dry, No erythema, No rash    EKG Interpretation  Interpreted by me  No prior EKG to compare to  Rhythm: normal sinus  Rate: normal 73  Axis: normal  Ectopy: none  Conduction: normal  ST Segments: normal  T Waves: normal  Clinical Impression: Normal sinus rhythm    Cardiac Monitor Strip Interpretation  Interpreted by me  Monitor strip interpreted for greater than 10 seconds  Rhythm: normal sinus  Rate: normal  Ectopy: none  ST Segments: normal      Radiology / Procedures:      CTA PULMONARY W CONTRAST (Final result)  Result time 10/28/21 00:25:03  Final result by Jenny Leone MD (10/28/21 00:25:03)                Impression:    1. No pulmonary embolus. 2. Pulmonary opacities compatible with viral pneumonia. 3. Reactive mediastinal and hilar lymphadenopathy.              Narrative:    EXAMINATION:   CTA OF THE CHEST 10/27/2021 1:58 am     TECHNIQUE:   CTA of the chest was performed after the administration of intravenous   contrast.  Multiplanar reformatted images are provided for review.  MIP   images are provided for review. Dose modulation, iterative reconstruction,   and/or weight based adjustment of the mA/kV was utilized to reduce the   radiation dose to as low as reasonably achievable. COMPARISON:   None. HISTORY:   ORDERING SYSTEM PROVIDED HISTORY: Shortness of breath, hypoxia, covid +   TECHNOLOGIST PROVIDED HISTORY:   Reason for exam:->Shortness of breath, hypoxia, covid +   Decision Support Exception - unselect if not a suspected or confirmed   emergency medical condition->Emergency Medical Condition (MA)   Reason for Exam: Shortness of breath, hypoxia, covid +   Type of Exam: Initial     FINDINGS:   Pulmonary Arteries: Pulmonary arteries are adequately opacified for   evaluation.  No evidence of intraluminal filling defect to suggest pulmonary   embolism.  Main pulmonary artery is normal in caliber. Mediastinum: Heart is normal in size without a pericardial effusion.  The   aorta, arch branches, and main pulmonary artery are normal in course and   caliber. Mildly prominent subcarinal lymph node measures up to 17 mm in short axis. Left hilar lymph node measures 18 x 16 mm.  An enlarged right hilar lymph   node measures 30 x 14 mm. Lungs/pleura: Lungs demonstrate diffuse ground-glass opacities.  No   significant pleural effusions.  Central airways are patent.  No bronchial   wall thickening or bronchiectasis.  No dominant or suspicious pulmonary   nodules. Upper Abdomen: Limited images of the upper abdomen are unremarkable.      Soft Tissues/Bones: No acute bone or soft tissue abnormality.                   Labs Reviewed   RESPIRATORY PANEL, MOLECULAR, WITH COVID-19 - Abnormal; Notable for the following components:       Result Value    SARS-CoV-2 DETECTED BY PCR (*)     All other components within normal limits   CBC WITH AUTO DIFFERENTIAL - Abnormal; Notable for the following components:    WBC 11.0 (*)     MCH 31.7 (*)     Segs Relative 90.5 (*) details)  On exam, the patient is afebrile and nontoxic appearing. He is hypoxic in the 70s and is in mild respiratory distress. He is placed on supplemental oxygen which is titrated up to 6 L before he can maintain O2 saturation above 90%. He is otherwise hemodynamically stable and neurologically intact. EKG shows a normal sinus rhythm with no ST elevation or depression. Labs are obtained and are significant for mild leukocytosis and an ABG with a decreased PCO2 of 67 and a normal pH. CTA chest is negative for pulmonary embolism. Her pulmonary opacities compatible with viral pneumonia. There is reactive mediastinal and hilar lymphadenopathy. The patient was treated with Toradol, Robitussin and Tessalon Perles with some improvement. I suspect that the patient has Covid pneumonia with hypoxia. . I have a low suspicion for PTX, PE, STEMI, pneumonia, flash pulmonary edema or sepsis. . I recommended admission to the hospital and the patient was agreeable. I discussed the case with the hospitalist who will admit the patient for further treatment and care. The patient is currently in stable condition awaiting admission. Clinical Impression:  1. Pneumonia due to COVID-19 virus    2. Hypoxemia        Disposition referral (if applicable):  No follow-up provider specified. Disposition medications (if applicable):  Current Discharge Medication List          Comment: Please note this report has been produced using speech recognition software and may contain errors related to that system including errors in grammar, punctuation, and spelling, as well as words and phrases that may be inappropriate. If there are any questions or concerns please feel free to contact the dictating provider for clarification.         Vicente Langford MD  10/28/21 [id number]

## 2021-10-27 NOTE — ED NOTES
Patient placed on 4 liters in triage due to 02 79% when arriving to room patient at 81% patient titrated up to 6 liters. Patient now at 92%. Patient reports he has \" covid pneumonia\" reports went to urgent care this weekend and dx with covid pneumonia states o2 was low at urgent care and stated they wanted him to be seen ed this weekend but noone had covid beds at the hospital. Patient currently taking antibiotics z pack last day is tomorrow.  Covid + this weekend      Reynaldo Franco, WILBERT  10/26/21 [address] [facility] WIL[place]ERT Charles  10/27/21 3026

## 2021-10-28 NOTE — PROGRESS NOTES
Progress Note  Date:10/28/2021       Room:A  Patient Name:Cecilio Jamil     YOB: 1955     Age:66 y.o. Patient seen and examined at bedside. On BiPAP. Mild tachypnea. Not in distress. Able to talk. Subjective    Subjective:  Symptoms:  He reports shortness of breath and cough. No chest pain. Diet:  Poor intake. Pain:  He reports no pain. Review of Systems   Constitutional: Positive for fever. HENT: Negative for congestion. Respiratory: Positive for cough, shortness of breath and wheezing. Cardiovascular: Negative for chest pain, palpitations and leg swelling. Gastrointestinal: Negative for abdominal distention and abdominal pain. Genitourinary: Negative for difficulty urinating and flank pain. Musculoskeletal: Negative for arthralgias. Neurological: Negative for dizziness and headaches. Psychiatric/Behavioral: Negative for agitation and confusion. Objective         Vitals Last 24 Hours:  TEMPERATURE:  Temp  Av.6 °F (37.6 °C)  Min: 97.8 °F (36.6 °C)  Max: 102.6 °F (39.2 °C)  RESPIRATIONS RANGE: Resp  Av.8  Min: 9  Max: 31  PULSE OXIMETRY RANGE: SpO2  Av.6 %  Min: 72 %  Max: 95 %  PULSE RANGE: Pulse  Av.6  Min: 59  Max: 85  BLOOD PRESSURE RANGE: Systolic (54EKT), ZLV:523 , Min:110 , WSX:248   ; Diastolic (42EIP), OBR:75, Min:46, Max:67    I/O (24Hr): Intake/Output Summary (Last 24 hours) at 10/28/2021 1244  Last data filed at 10/28/2021 1240  Gross per 24 hour   Intake 10 ml   Output 900 ml   Net -890 ml     Objective:  General Appearance:  Uncomfortable. Vital signs: (most recent): Blood pressure (!) 143/65, pulse 66, temperature 99.9 °F (37.7 °C), temperature source Axillary, resp. rate 19, height 5' 9\" (1.753 m), weight 248 lb 3.8 oz (112.6 kg), SpO2 95 %. Fever. Output: Producing urine. HEENT: Normal HEENT exam.    Lungs: Tachypnea. There are decreased breath sounds, wheezes and rhonchi. Heart: Normal rate. Regular rhythm. S1 normal.    Abdomen: Abdomen is soft and non-distended. There is no abdominal tenderness. Extremities: Normal range of motion. Neurological: Patient is alert and oriented to person, place and time. Labs/Imaging/Diagnostics    Labs:  CBC:  Recent Labs     10/27/21  0018 10/28/21  1007   WBC 11.0* 17.8*   RBC 4.82 4.70   HGB 15.3 15.1   HCT 45.2 44.1   MCV 93.8 93.8   RDW 13.5 13.5    218     CHEMISTRIES:  Recent Labs     10/27/21  0018 10/28/21  1007    135   K 4.5 4.7    99   CO2 21 24   BUN 30* 32*   CREATININE 1.0 0.6*   GLUCOSE 150* 157*     PT/INR:  Recent Labs     10/28/21  1007   PROTIME 13.5   INR 1.05     APTT:  Recent Labs     10/28/21  1007   APTT 29.8     LIVER PROFILE:  Recent Labs     10/27/21  0018 10/28/21  1007   AST 32 37   ALT 19 18   BILITOT 0.7 0.8   ALKPHOS 58 60       Imaging Last 24 Hours:  XR CHEST PORTABLE    Result Date: 10/28/2021  EXAMINATION: ONE XRAY VIEW OF THE CHEST 10/28/2021 2:10 am COMPARISON: CT dated 10/27/2021 HISTORY: ORDERING SYSTEM PROVIDED HISTORY: Hypoxia TECHNOLOGIST PROVIDED HISTORY: Reason for exam:->Hypoxia Reason for Exam: Hypoxia Acuity: Acute Type of Exam: Initial FINDINGS: The heart size is upper normal.  There is extensive airspace disease bilaterally. A pneumothorax is not identified. Findings most consistent with severe bilateral COVID pneumonia. This appears progressed since the recent prior CT exam.     CTA PULMONARY W CONTRAST    Result Date: 10/28/2021  EXAMINATION: CTA OF THE CHEST 10/27/2021 1:58 am TECHNIQUE: CTA of the chest was performed after the administration of intravenous contrast.  Multiplanar reformatted images are provided for review. MIP images are provided for review. Dose modulation, iterative reconstruction, and/or weight based adjustment of the mA/kV was utilized to reduce the radiation dose to as low as reasonably achievable. COMPARISON: None.  HISTORY: ORDERING SYSTEM PROVIDED HISTORY: Shortness of breath, hypoxia, covid + TECHNOLOGIST PROVIDED HISTORY: Reason for exam:->Shortness of breath, hypoxia, covid + Decision Support Exception - unselect if not a suspected or confirmed emergency medical condition->Emergency Medical Condition (MA) Reason for Exam: Shortness of breath, hypoxia, covid + Type of Exam: Initial FINDINGS: Pulmonary Arteries: Pulmonary arteries are adequately opacified for evaluation. No evidence of intraluminal filling defect to suggest pulmonary embolism. Main pulmonary artery is normal in caliber. Mediastinum: Heart is normal in size without a pericardial effusion. The aorta, arch branches, and main pulmonary artery are normal in course and caliber. Mildly prominent subcarinal lymph node measures up to 17 mm in short axis. Left hilar lymph node measures 18 x 16 mm. An enlarged right hilar lymph node measures 30 x 14 mm. Lungs/pleura: Lungs demonstrate diffuse ground-glass opacities. No significant pleural effusions. Central airways are patent. No bronchial wall thickening or bronchiectasis. No dominant or suspicious pulmonary nodules. Upper Abdomen: Limited images of the upper abdomen are unremarkable. Soft Tissues/Bones: No acute bone or soft tissue abnormality. 1. No pulmonary embolus. 2. Pulmonary opacities compatible with viral pneumonia. 3. Reactive mediastinal and hilar lymphadenopathy. Assessment//Plan           Hospital Problems         Last Modified POA    Pneumonia due to COVID-19 virus 10/27/2021 Yes        Assessment:  Kristina Vizcarra is a 78 y.o. male admitted for progressive shortness of breath.     Assessment and plan:    Severe COVID-19 pneumonia  Cough and progressive shortness of breath  On BiPAP; tachypnea improved with RR in 20s  Fever 101 last night  Continue oxygen supplementation; wean off as able  Maintain on BiPAP for now; switched p.o. to IV meds  Continue dexamethasone  Consulted pharmacy for baricitinib  CXR with infiltrates  procal 0.22; monitor off antibiotics  Monitor inflammatory markers   Follow-up sputum cuture, legionella antigen, strep pneumonia antigen  Seen by pulmonary  Prone positioning as able  Bronchodilators  Zofran as needed for nausea;  add PPI    Hypertension  Continue antihypertensives).        Electronically signed by Krys Paul MD on 10/28/21 at 12:44 PM EDT

## 2021-10-28 NOTE — CARE COORDINATION
Pt on bipap and requested to eat his breakfast and take meds. Placed on 15 L high flow O2 and while laying he maintained sat in low 90s and with movement of sitting on the side of the bed his sats dropped rapidly into the 70s. Pt was placed back on bipap, spoke with Kota RT on phone who instructed RN to increased bipap FiO2 to 100%. Pt recovered back into 90s. Pt SO/Hollie called to check on him this morning,[phone] and call returned to her with update provided.

## 2021-10-28 NOTE — PROGRESS NOTES
Pharmacy Consult for Baricitinib Initiation per Dr. Crystal Hancock per [facility] OF THE [facility] P&T Committee  **All criteria need to be met to receive   Baricitinib for COVID-19 patients**   Age    >5years old     Yes   Laboratory Results    Confirmed positive COVID-19     PLUS    ANY elevation in biomarkers   (CRP, D-dimer, LDH, ferritin)       Yes     Concomitant Therapy    Receiving systemic steroids for treatment of COVID 19     Yes   Oxygen Status        Requiring supplemental oxygen   (>1 L NC, HFNC, Heated Vapotherm, biPAP, mechanical ventilation)        Yes  PAP @ 15 L/min   Special Considerations    Pregnancy  Severe Hepatic Impairment  Chronic/Recurrent Infections   Hemoglobin <8         None     Contraindications    1. Invasive active mycobacterial or fungal infection  2. Lymphocyte count <200  3. Neutrophil count, ANC <500   4. Significant immunosuppression    [X]    None    Epic labs, PMH, and medications list reviewed     HEMOGLOBIN AND HEMATOCRIT  Recent Labs     10/27/21  0018 10/27/21  0018 10/28/21  1007   HGB 15.3   < > 15.1   HCT 45.2  --  44.1    < > = values in this interval not displayed. RENAL LAB EVALUATION  Estimated Creatinine Clearance: 148 mL/min (A) (based on SCr of 0.6 mg/dL (L)).   Recent Labs     10/27/21  0018 10/28/21  1007   BUN 30* 32*   CREATININE 1.0 0.6*     LIVER FUNCTION LAB EVALUATION  Recent Labs     10/28/21  1007   AST 37   ALT 18   ALKPHOS 60   BILITOT 0.8   INR 1.05     Lymphocyte: 500    ANC  Recent Labs     10/27/21  0018   SEGSABS 10.0       Dosing Recommendations:    Baricitinib 4 mg PO daily x 14 days (or until hospital discharge)    Renal dose adjustment:  -eGFR > 60: no adjustment necessary   -eGFR 30 - 60: 2 mg PO daily   -eGFR 15 - 30: 1 mg PO daily   -eGFR <15: not recommended   - HD, PD, CRRT: no recommendations at this time     Thank you for the consult,  Zaid Urbina, [address] [facility], PharmD

## 2021-10-28 NOTE — PROGRESS NOTES
Pulmonary and Critical Care  Progress Note      VITALS:  BP (!) 110/46   Pulse 65   Temp 101 °F (38.3 °C) (Axillary)   Resp 22   Ht 5' 9\" (1.753 m)   Wt 248 lb 3.8 oz (112.6 kg)   SpO2 92%   BMI 36.66 kg/m²     Subjective:   CHIEF COMPLAINT :SOB     HPI:                The patient is lying in the bed. He is in mild resp distress    Objective:   PHYSICAL EXAM:    LUNGS:Occasional basal crackles  Abd-soft, BS+,NT  Ext- no pedal edema  CVS-s1s2, no murmurs      DATA:    CBC:  Recent Labs     10/27/21  0018   WBC 11.0*   RBC 4.82   HGB 15.3   HCT 45.2      MCV 93.8   MCH 31.7*   MCHC 33.8   RDW 13.5   SEGSPCT 90.5*      BMP:  Recent Labs     10/27/21  0018      K 4.5      CO2 21   BUN 30*   CREATININE 1.0   CALCIUM 8.7   GLUCOSE 150*      ABG:  Recent Labs     10/27/21  0000   PH 7.43   PO2ART 67*   RRA2WOX 37.0   O2SAT 92.1*     BNP  No results found for: BNP   D-Dimer:  Lab Results   Component Value Date    DDIMER 417 (H) 10/27/2021      Radiology:   Findings most consistent with severe bilateral COVID pneumonia.  This appears   progressed since the recent prior CT exam     1. Assessment/Plan     Patient Active Problem List    Diagnosis Date Noted    Pneumonia due to COVID-19 virus 10/27/2021   Acute Hypoxic resp failure  Bilateral Pneumonia sec to COVID-19  Obesity       1. Inhalers  2. Decadron  3. Insulin  4. Keep sats >92%  5. ICS  6. OOB  7. PT/OT  8. Prone ventilation  9. CXR in am  10.  C/w present management    Electronically signed by El Hamilton MD on 10/28/2021 at 8:40 AM

## 2021-10-28 NOTE — PROGRESS NOTES
Physician Progress Note      PATIENT:               Day Armstrong  CSN #:                  [id number]  :                       1955  ADMIT DATE:       10/26/2021 11:34 PM  DISCH DATE:  Brijesh Garcia  PROVIDER #:        Neha Kirk          QUERY TEXT:    Hospitalists,    Pt admitted with COVID-19 and noted to have (SIRS and meets [address] sepsis   criteria. If possible, please document in progress notes and discharge   summary if you are evaluating and/or treating: The medical record reflects the following:  Risk Factors: COVID  Clinical Indicators: temp 97.8-102.6, RR 13-34, HR 59-92, CRP >300, WBC   11-17.8, resp failure  Treatment: labs, imaging, O2, pulm consult, RT    Thank you,  Aileen Garay RN CDS  [phone]  Options provided:  -- Sepsis present on admission due to COVID-19 infection  -- Sepsis not present on admission due to COVID-19 infection  -- Sepsis present on admission due to COVID-19 pneumonia  -- Sepsis not present on admission due to COVID-19 pneumonia  -- Covid-19 infection without sepsis  -- Covid-19 pneumonia without sepsis  -- Other - I will add my own diagnosis  -- Disagree - Not applicable / Not valid  -- Disagree - Clinically unable to determine / Unknown  -- Refer to Clinical Documentation Reviewer    PROVIDER RESPONSE TEXT:    This patient has sepsis which was present on admission due to COVID-19   infection. Query created by: Ale Mccray on 10/28/2021 1:17 PM      Electronically signed by:   Neha Kirk 10/28/2021 3:28 PM

## 2021-10-29 NOTE — PROGRESS NOTES
10/29/21 0510   NIV Type   NIV Started/Stopped On   Equipment Type v60   Mode Bilevel   Mask Type Full face mask   Mask Size Medium   Bonnet size Medium   Settings/Measurements   IPAP 20 cmH20   CPAP/EPAP 14 cmH2O   Rate Ordered 12   Resp 27   FiO2  100 %   I Time/ I Time % 1.05 s   Vt Exhaled 590 mL   Minute Volume 15.6 Liters   Mask Leak (lpm) 82 lpm   Comfort Level Good   Using Accessory Muscles No   SpO2 92   Alarm Settings   Alarms On Y   Press Low Alarm 3 cmH2O   High Pressure Alarm 30 cmH2O   Delay Alarm 20 sec(s)   Apnea (secs) 20 secs   Resp Rate Low Alarm 13   High Respiratory Rate 40 br/min

## 2021-10-29 NOTE — PROGRESS NOTES
Hospitalist Progress Note       10/29/2021 4:22 PM  Admit Date: 10/26/2021    PCP: Clayton Gracia MD     Assessment and Plan:   1. Acute hypoxic respiratory failure: Due to COVID-19 pneumonia. On BiPAP. 2.  COVID-19 pneumonia: Dexamethasone is held for unknown reason--we will resume. Continue baricitinib. Monitor LDH and CRP. 3.  Hypertension: On Cozaar. Atenolol is probably held for bradycardia-placed parameter. 4.  Hyperlipidemia: Continue statin. DVT prophylaxis: Lovenox  Disposition:    Patient Active Problem List:     Pneumonia due to COVID-19 virus      Subjective:     Chief Complaint   Patient presents with    Other     covid + low 02 at home symptoms started last thursday tested positive this weekend.  Shortness of Breath       F/U:  Interval History: Discussed with his nurse. Patient is still having shortness of breath and on BiPAP. Objective: Intake/Output Summary (Last 24 hours) at 10/29/2021 1622  Last data filed at 10/29/2021 1235  Gross per 24 hour   Intake --   Output 1350 ml   Net -1350 ml      Vitals:   Vitals:    10/29/21 1600   BP: 134/80   Pulse: 67   Resp: 20   Temp: 98.4 °F (36.9 °C)   SpO2: 96%     Physical Exam:  General: On BiPAP. Eyes: Not pale. Anicteric. Neck: No neck mass or thyromegaly  Lymph node: No cervical or supraclavicular lymphadenopathy  Neurology: Oriented x3. No focal neurological deficit  Cardiovascular: Regular. No murmur or S3. Respiratory: Bilateral rhonchi. No wheezes or crepitation  Abdomen: Soft. No tenderness. No palpable mass. Extremities: No pedal edema. The feet are warm.     Electronically signed by Amanda Osborne MD on 10/29/2021 at 4:22 PM  Rounding Hospitalist

## 2021-10-29 NOTE — PROGRESS NOTES
Pulmonary and Critical Care  Progress Note      VITALS:  BP (!) 163/77   Pulse 76   Temp 98.9 °F (37.2 °C) (Axillary)   Resp (!) 35   Ht 5' 9\" (1.753 m)   Wt 248 lb 3.8 oz (112.6 kg)   SpO2 92%   BMI 36.66 kg/m²     Subjective:   CHIEF COMPLAINT :SOB     HPI:                The patient is sitting in the bed. He is on the BIPAP. He is in mild resp distress    Objective:   PHYSICAL EXAM:    LUNGS:Occasional basal crackles  Abd-soft, BS+,NT  Ext- no pedal edema  CVS-s1s2, no murmurs      DATA:    CBC:  Recent Labs     10/27/21  0018 10/28/21  1007   WBC 11.0* 17.8*   RBC 4.82 4.70   HGB 15.3 15.1   HCT 45.2 44.1    218   MCV 93.8 93.8   MCH 31.7* 32.1*   MCHC 33.8 34.2   RDW 13.5 13.5   SEGSPCT 90.5* 93.7*      BMP:  Recent Labs     10/27/21  0018 10/28/21  1007    135   K 4.5 4.7    99   CO2 21 24   BUN 30* 32*   CREATININE 1.0 0.6*   CALCIUM 8.7 8.5   GLUCOSE 150* 157*      ABG:  Recent Labs     10/27/21  0000   PH 7.43   PO2ART 67*   UPT9XGX 37.0   O2SAT 92.1*     BNP  No results found for: BNP   D-Dimer:  Lab Results   Component Value Date    DDIMER 837 (H) 10/29/2021      1. Radiology: Stable diffuse airspace opacities. Assessment/Plan     Patient Active Problem List    Diagnosis Date Noted    Pneumonia due to COVID-19 virus 10/27/2021   Acute Hypoxic resp failure  Bilateral Pneumonia sec to COVID-19  Obesity       1. Decadron  2. Insulin  3. Inhalers  4. Keep sats > 92%  5. ICS  6. OOB  7. PT/OT  8.  C/w present management    Electronically signed by Valentin Corbin MD on 10/29/2021 at 10:56 AM

## 2021-10-29 NOTE — PROGRESS NOTES
Michael brought to room and pt offered a bath and pt stated he will turn on his call light when he is ready to clean up and sit in the chair

## 2021-10-30 NOTE — PROGRESS NOTES
Pulmonary and Critical Care  Progress Note      VITALS:  /74   Pulse 69   Temp 99.8 °F (37.7 °C) (Oral)   Resp 22   Ht 5' 9\" (1.753 m)   Wt 248 lb 3.8 oz (112.6 kg)   SpO2 100%   BMI 36.66 kg/m²     Subjective:   Chief complaint: Acute hypoxemic respiratory failure, bilateral COVID-19 pneumonia, shortness of breath  Mild to moderate resp distress-remains on BiPAP and currently on 70% FiO2  Patient awake and alert  States that he does not feel any better over the past 24 hours  Objective:   PHYSICAL EXAM:    LUNGS: Fairly clear breath sounds bilaterally with a few scattered mid to basilar crackles. No wheezing noted  D-dimer 10/29/2021-837  On low-dose Lovenox  Remains mildly febrile with temp 99.8. No SVT noted  DATA:    CBC:  Recent Labs     10/28/21  1007   WBC 17.8*   RBC 4.70   HGB 15.1   HCT 44.1      MCV 93.8   MCH 32.1*   MCHC 34.2   RDW 13.5   SEGSPCT 93.7*      BMP:  Recent Labs     10/28/21  1007      K 4.7   CL 99   CO2 24   BUN 32*   CREATININE 0.6*   CALCIUM 8.5   GLUCOSE 157*      ABG:  No results for input(s): PH, PO2ART, [id number], HCO3, BEART, O2SAT in the last 72 hours. BNP  No results found for: BNP   D-Dimer:  Lab Results   Component Value Date    DDIMER 837 (H) 10/29/2021      Radiology: Chest x-ray 10/29/2021  No lines or tubes.  Stable cardiomediastinal silhouette.  Diffuse airspace   opacities are unchanged.  No significant pleural effusion.  No pneumothorax.       No acute osseous abnormality.           Impression   1. Stable diffuse airspace opacities.          Assessment:     Patient Active Problem List   Diagnosis    Pneumonia due to COVID-19 virus       Plan:   continue present treatment  Continue IV Decadron, baricitinib and aggressive bronchopulmonary toilet  Continue to wean FiO2 to keep O2 saturation greater than 90% and may be changed from BiPAP to air Vo or Vapotherm  Up in chair as much as possible    Hugo Lamar MD MD  10/30/2021  6:30 AM

## 2021-10-30 NOTE — PROGRESS NOTES
10/29/21 3268   NIV Type   NIV Started/Stopped On   Equipment Type v60   Mode Bilevel   Mask Type Full face mask   Mask Size Medium   Bonnet size Medium   Settings/Measurements   IPAP 20 cmH20   CPAP/EPAP 14 cmH2O   Rate Ordered 12   Resp 28   FiO2  85 %   I Time/ I Time % 1.05 s   Vt Exhaled 455 mL   Minute Volume 15.4 Liters   Mask Leak (lpm) 27 lpm   Comfort Level Good   Using Accessory Muscles No   SpO2 93   Alarm Settings   Alarms On Y   Press Low Alarm 3 cmH2O   High Pressure Alarm 30 cmH2O   Delay Alarm 20 sec(s)   Apnea (secs) 20 secs   Resp Rate Low Alarm 13   High Respiratory Rate 40 br/min

## 2021-10-30 NOTE — PROGRESS NOTES
PT bipap removed and placed on 15L HFNC from about 1145 to 1222 maintain O2 sat in upper 80's.  Patient was awake and alert having lunch in no increased distress

## 2021-10-30 NOTE — PROGRESS NOTES
Hospitalist Progress Note       10/30/2021 1:40 PM  Admit Date: 10/26/2021    PCP: Jeovanny Merino MD     Assessment and Plan:   1. Acute hypoxic respiratory failure: Due to COVID-19 pneumonia. On BiPAP. Pulmonary consult appreciated. 2.  COVID-19 pneumonia: Continue dexamethasone and baricitinib. Monitor LDH and CRP. 3.  Hypertension: On Cozaar and atenolol. 4.  Hyperlipidemia: Continue statin. DVT prophylaxis: Lovenox  Disposition:    Patient Active Problem List:     Pneumonia due to COVID-19 virus      Subjective:     Chief Complaint   Patient presents with    Other     covid + low 02 at home symptoms started last thursday tested positive this weekend.  Shortness of Breath       F/U:  Interval History:     He has no new complaint. He still having shortness of breath. Objective: Intake/Output Summary (Last 24 hours) at 10/30/2021 1340  Last data filed at 10/30/2021 0236  Gross per 24 hour   Intake --   Output 1075 ml   Net -1075 ml      Vitals:   Vitals:    10/30/21 0940   BP:    Pulse: 78   Resp:    Temp:    SpO2: 91%     Physical Exam:  General: On BiPAP. Eyes: Not pale. Anicteric. Neck: No neck mass or thyromegaly  Lymph node: No cervical or supraclavicular lymphadenopathy  Neurology: Oriented x3. No focal neurological deficit  Cardiovascular: Regular. No murmur or S3. Respiratory: Bilateral rhonchi. No wheezes or crepitation  Abdomen: Soft. No tenderness. No palpable mass. Extremities: No pedal edema. The feet are warm.     Electronically signed by Parth Pruett MD on 10/30/2021 at 1:40 PM  Rounding Hospitalist

## 2021-10-30 NOTE — PROGRESS NOTES
PT bipap removed and placed on 15L HFNC from about 1655 to 1720 maintain O2 sat in upper 80's.  Patient was awake and alert having lunch in no increased distress

## 2021-10-31 NOTE — PLAN OF CARE
Problem: Pain:  Goal: Pain level will decrease  Description: Pain level will decrease  Outcome: Ongoing  Goal: Control of acute pain  Description: Control of acute pain  Outcome: Ongoing  Goal: Control of chronic pain  Description: Control of chronic pain  Outcome: Ongoing     Problem: Airway Clearance - Ineffective  Goal: Achieve or maintain patent airway  Outcome: Ongoing     Problem: Gas Exchange - Impaired  Goal: Absence of hypoxia  Outcome: Ongoing  Goal: Promote optimal lung function  Outcome: Ongoing     Problem: Breathing Pattern - Ineffective  Goal: Ability to achieve and maintain a regular respiratory rate  Outcome: Ongoing

## 2021-10-31 NOTE — PROGRESS NOTES
10/31/21 1306   Oxygen Therapy/Pulse Ox   O2 Therapy Oxygen   O2 Device High flow nasal cannula   O2 Flow Rate (L/min) 15 L/min  (with non rebreather mask)   SpO2 (!) 87 %   Pulse Oximeter Device Mode Continuous   Pulse Oximeter Device Location Finger   Blood Gas  Performed?  No

## 2021-10-31 NOTE — PROGRESS NOTES
10/31/21 0944   NIV Type   $NIV $Daily Charge   NIV Started/Stopped On   Equipment Type v60   Mode Bilevel   Mask Type Full face mask   Mask Size Medium   Settings/Measurements   IPAP 20 cmH20   CPAP/EPAP 14 cmH2O   Rate Ordered 12   Resp 29   Insp Rise Time (%) 3 %   FiO2  75 %   I Time/ I Time % 1.05 s   Vt Exhaled 729 mL   Minute Volume 29 Liters   Mask Leak (lpm) 4 lpm   Comfort Level Good   Using Accessory Muscles No   SpO2 93   Breath Sounds   Right Upper Lobe Diminished   Right Middle Lobe Diminished   Right Lower Lobe Diminished   Left Upper Lobe Diminished   Left Lower Lobe Diminished   Alarm Settings   Alarms On Y   Press Low Alarm 5 cmH2O   High Pressure Alarm 30 cmH2O   Delay Alarm 20 sec(s)   Apnea (secs) 20 secs   Resp Rate Low Alarm 13   High Respiratory Rate 40 br/min

## 2021-10-31 NOTE — PROGRESS NOTES
Hospitalist Progress Note       10/31/2021 2:33 PM  Admit Date: 10/26/2021    PCP: Lovely Estevez MD     Assessment and Plan:   1. Acute hypoxic respiratory failure: Due to COVID-19 pneumonia. On BiPAP. Pulmonary consult appreciated. 2.  COVID-19 pneumonia: Continue dexamethasone and baricitinib. Monitor LDH and CRP. 3.  Chest pain: Related to COVID-19 pneumonia. CTA on 10/27-no pulmonary embolism. Resolved. 4.  Hypertension: On Cozaar and atenolol. 5.  Hyperlipidemia: Continue statin. DVT prophylaxis: Lovenox  Disposition:    Patient Active Problem List:     Pneumonia due to COVID-19 virus      Subjective:     Chief Complaint   Patient presents with    Other     covid + low 02 at home symptoms started last thursday tested positive this weekend.  Shortness of Breath       F/U:  Interval History:     He had chest pain this morning-sharp,resolved spontaneously. No increased shortness of breath. Objective: Intake/Output Summary (Last 24 hours) at 10/31/2021 1433  Last data filed at 10/31/2021 0435  Gross per 24 hour   Intake 10 ml   Output 750 ml   Net -740 ml      Vitals:   Vitals:    10/31/21 1306   BP:    Pulse:    Resp:    Temp:    SpO2: (!) 87%     Physical Exam:  General: On BiPAP. Eyes: Not pale. Anicteric. Neck: No neck mass or thyromegaly  Lymph node: No cervical or supraclavicular lymphadenopathy  Neurology: Oriented x3. No focal neurological deficit  Cardiovascular: Regular. No murmur or S3. Respiratory: Bilateral rhonchi. No wheezes or crepitation  Abdomen: Soft. No tenderness. No palpable mass. Extremities: No pedal edema. The feet are warm.     Electronically signed by Loyd Elise MD on 10/31/2021 at 2:33 PM  Rounding Hospitalist

## 2021-10-31 NOTE — PROGRESS NOTES
10/31/21 0031   NIV Type   NIV Started/Stopped On   Equipment Type v60   Mode Bilevel   Mask Type Full face mask   Mask Size Medium   Settings/Measurements   IPAP 20 cmH20   CPAP/EPAP 14 cmH2O   Rate Ordered 12   Resp 25   Insp Rise Time (%) 3 %   FiO2  75 %   I Time/ I Time % 1.05 s   Vt Exhaled 483 mL   Minute Volume 12 Liters

## 2021-10-31 NOTE — PROGRESS NOTES
Pulmonary and Critical Care  Progress Note      VITALS:  BP (!) 140/75   Pulse 73   Temp 99.7 °F (37.6 °C) (Axillary)   Resp 19   Ht 5' 9\" (1.753 m)   Wt 237 lb 3.4 oz (107.6 kg)   SpO2 94%   BMI 35.03 kg/m²     Subjective:   Chief complaint: Bilateral COVID-19 pneumonia, acute hypoxemic respiratory failure, shortness of breath, obesity  Moderate resp distress unchanged and perhaps slightly worsened over the past 24 hours  Unable to wean off BiPAP and currently on 90% FiO2  Quickly desaturates with any type of movement  Patient awake and alert  Complaining of chest discomfort anteriorly but states that this is due to his persistent cough  Objective:   PHYSICAL EXAM:    LUNGS: Mildly diminished breath sounds throughout all lung areas with a few scattered rhonchi. No wheezing is noted  WBC 14.7 with hemoglobin 14.8 on 10/30/2021  CRP greater than 300, procalcitonin 0.224 on 10/29/2021  DATA:    CBC:  Recent Labs     10/28/21  1007 10/30/21  1107   WBC 17.8* 14.7*   RBC 4.70 4.66   HGB 15.1 14.8   HCT 44.1 44.4    313   MCV 93.8 95.3   MCH 32.1* 31.8*   MCHC 34.2 33.3   RDW 13.5 13.5   SEGSPCT 93.7* 82.0*   BANDSPCT  --  10      BMP:  Recent Labs     10/28/21  1007 10/30/21  1107    139   K 4.7 4.8   CL 99 102   CO2 24 26   BUN 32* 35*   CREATININE 0.6* 0.8*   CALCIUM 8.5 8.7   GLUCOSE 157* 148*      ABG:  No results for input(s): PH, PO2ART, FWJ7ZDT, HCO3, BEART, O2SAT in the last 72 hours. BNP  No results found for: BNP   D-Dimer:  Lab Results   Component Value Date    DDIMER 837 (H) 10/29/2021      1. Radiology: No chest x-ray ordered for today      Assessment:     Patient Active Problem List   Diagnosis    Pneumonia due to COVID-19 virus       Plan:   1. continue present treatment  2. Continue BiPAP with high FiO2 to keep O2 saturation greater than 90%  3. Repeat chest x-ray in a.m.   4. Continue aggressive bronchopulmonary toilet, IV Decadron and baricitinib    Hollis Ortiz MD MD  10/31/2021  8:03 AM

## 2021-11-01 NOTE — PROGRESS NOTES
Hospitalist Progress Note       11/1/2021 5:17 PM  Admit Date: 10/26/2021    PCP: Arjun Naidu MD     Assessment and Plan:   1. Acute hypoxic respiratory failure: Due to COVID-19 pneumonia. On BiPAP. Pulmonary consult appreciated. 2.  COVID-19 pneumonia: Continue dexamethasone and baricitinib. Monitor LDH and CRP. 3.  Chest pain: Related to COVID-19 pneumonia. CTA on 10/27-no pulmonary embolism. Resolved. 4.  Hypertension: On Cozaar and atenolol. 5.  Hyperlipidemia: Continue statin. DVT prophylaxis: Lovenox  Disposition:    Patient Active Problem List:     Pneumonia due to COVID-19 virus      Subjective:     Chief Complaint   Patient presents with    Other     covid + low 02 at home symptoms started last thursday tested positive this weekend.  Shortness of Breath       F/U:  Interval History:     Desaturated on high flow oxygen. Placed on BiPAP. Objective: Intake/Output Summary (Last 24 hours) at 11/1/2021 1717  Last data filed at 11/1/2021 1200  Gross per 24 hour   Intake --   Output 1250 ml   Net -1250 ml      Vitals:   Vitals:    11/01/21 1515   BP:    Pulse:    Resp: 27   Temp:    SpO2:      Physical Exam:  General: On BiPAP. Eyes: Not pale. Anicteric. Neck: No neck mass or thyromegaly  Lymph node: No cervical or supraclavicular lymphadenopathy  Neurology: Oriented x3. No focal neurological deficit  Cardiovascular: Regular. No murmur or S3. Respiratory: Bilateral rhonchi. Diminished breth sounds. Abdomen: Soft. No tenderness. No palpable mass. Extremities: No pedal edema. The feet are warm.     Electronically signed by Carla Blakely MD on 11/1/2021 at 5:17 PM  Rounding Hospitalist

## 2021-11-01 NOTE — PROGRESS NOTES
Pulmonary and Critical Care  Progress Note      VITALS:  BP (!) 135/99   Pulse 82   Temp 98 °F (36.7 °C) (Axillary)   Resp 20   Ht 5' 9\" (1.753 m)   Wt 235 lb 14.3 oz (107 kg)   SpO2 (!) 85%   BMI 34.84 kg/m²     Subjective:   Chief complaint: Acute hypoxemic respiratory failure, bilateral COVID-19 pneumonia, shortness of breath, mild obesity  Mild to moderate resp distress  Patient awake and alert  Wore BiPAP most of the night but currently on high flow nasal oxygen with facemask trying to eat breakfast  Objective:   PHYSICAL EXAM:    LUNGS: Fine basilar rales in the mid to lower lung fields. No wheezing or rhonchi noted  O2 saturation currently 83% on high flow nasal oxygen  No new lab, WBC 13.6 on 10/31/2021  DATA:    CBC:  Recent Labs     10/30/21  1107 10/31/21  0932   WBC 14.7* 13.6*   RBC 4.66 4.77   HGB 14.8 15.2   HCT 44.4 45.2    355   MCV 95.3 94.8   MCH 31.8* 31.9*   MCHC 33.3 33.6   RDW 13.5 13.5   SEGSPCT 82.0* 84.0*   BANDSPCT 10 6      BMP:  Recent Labs     10/30/21  1107 10/31/21  0932    137   K 4.8 4.5    100   CO2 26 25   BUN 35* 32*   CREATININE 0.8* 0.7*   CALCIUM 8.7 8.9   GLUCOSE 148* 193*      ABG:  No results for input(s): PH, PO2ART, MQF5UXU, HCO3, BEART, O2SAT in the last 72 hours. BNP  No results found for: BNP   D-Dimer:  Lab Results   Component Value Date    DDIMER 837 (H) 10/29/2021      Radiology: Chest x-ray 10/31/2021 showed new pneumomediastinum  New pneumomediastinum and subcutaneous emphysema in the right cervical   region.  Similar bilateral airspace opacities.  Trace right apical   pneumothorax.  No gross bony abnormality.           Impression   New pneumomediastinum, subcutaneous emphysema and trace right apical   pneumothorax.          Assessment:     Patient Active Problem List   Diagnosis    Pneumonia due to COVID-19 virus       Plan:   continue present treatment  Continue to use high flow high concentration oxygen to maintain O2 saturation greater than 86%  Repeat chest x-ray today    Beto Badillo MD MD  11/1/2021  8:08 AM

## 2021-11-01 NOTE — PROGRESS NOTES
Pt call light answered, off SPO2. Pt on 15L high flow, remained 58% when back on SPO2.  Put back on bi-pap 100% FIo2, SPO2 now 89-90%

## 2021-11-02 NOTE — PROGRESS NOTES
Comprehensive Nutrition Assessment    Type and Reason for Visit:  Initial (los)    Nutrition Recommendations/Plan:   Continue current diet as ordered  Add standard oral nutrition supplement TID  Encourage po intake as able  Please document all po intake  Will monitor po intake, weight trends, poc    Nutrition Assessment:  Pt admitted for pneumonia due to covid-19, PMH: HTN, HLD, pt currently on bipap, pt currently on regular diet, 1 meal of % and 1 meal of 1-25% documented in the past 72 hr per chart review, weight loss since admission noted, will follow pt high nutrition risk    Malnutrition Assessment:  Malnutrition Status: At risk for malnutrition (Comment)    Context:  Acute Illness     Estimated Daily Nutrient Needs:  Energy (kcal):  3365-3006 ([address] w/ stress factor 1.0-1.2); Weight Used for Energy Requirements:  Current     Protein (g):  73-88 (1.0-1.2 g/kg); Weight Used for Protein Requirements:  Ideal      Nutrition Related Findings:  Labs: Glucose 193      Wounds:  None       Current Nutrition Therapies:    ADULT DIET; Regular    Anthropometric Measures:  · Height: 5' 9.02\" (175.3 cm)  · Current Body Weight: 231 lb 7.7 oz (105 kg)   · Admission Body Weight: 242 lb 15.2 oz (110.2 kg) (first measured weight)    · Ideal Body Weight: 160 lbs; % Ideal Body Weight 144.7 %   · BMI: 34.2  · BMI Categories: Obese Class 1 (BMI 30.0-34. 9)       Nutrition Diagnosis:   · Inadequate oral intake related to acute injury/trauma as evidenced by weight loss    Nutrition Interventions:   Food and/or Nutrient Delivery:  Continue Current Diet, Start Oral Nutrition Supplement  Nutrition Education/Counseling:  No recommendation at this time   Coordination of Nutrition Care:  Continue to monitor while inpatient    Goals:  Pt will consistently consume greater than 50-75% of meals and supplements       Nutrition Monitoring and Evaluation:   Behavioral-Environmental Outcomes:  None Identified   Food/Nutrient Intake Outcomes:  Food and Nutrient Intake, Supplement Intake  Physical Signs/Symptoms Outcomes:  Biochemical Data, Weight, GI Status, Hemodynamic Status, Fluid Status or Edema, Skin     Discharge Planning:     Too soon to determine     Electronically signed by Perry Vergara MS, RD, LD on 11/2/21 at 11:40 AM EDT    Contact: [phone]

## 2021-11-02 NOTE — PROGRESS NOTES
Hospitalist Progress Note       11/2/2021 10:18 AM  Admit Date: 10/26/2021    PCP: Chris Camacho MD     Assessment and Plan:   1. Acute hypoxic respiratory failure: Due to COVID-19 pneumonia. On BiPAP. Pulmonary consult appreciated. 2.  COVID-19 pneumonia: Continue dexamethasone and baricitinib. Monitor LDH and CRP. 3.  Chest pain: Related to COVID-19 pneumonia. CTA on 10/27-no pulmonary embolism. Resolved. 4.  Hypertension: On Cozaar and atenolol. 5.  Hyperlipidemia: Continue statin. DVT prophylaxis: Lovenox  Disposition:    Patient Active Problem List:     Pneumonia due to COVID-19 virus      Subjective:     Chief Complaint   Patient presents with    Other     covid + low 02 at home symptoms started last thursday tested positive this weekend.  Shortness of Breath       F/U:  Interval History:     Remains on BiPAP. No new complaints    Objective: Intake/Output Summary (Last 24 hours) at 11/2/2021 1018  Last data filed at 11/1/2021 2121  Gross per 24 hour   Intake 120 ml   Output 900 ml   Net -780 ml      Vitals:   Vitals:    11/02/21 0759   BP: (!) 149/79   Pulse: 80   Resp: 24   Temp: 99 °F (37.2 °C)   SpO2: 92%     Physical Exam:  General: On BiPAP. Eyes: Not pale. Anicteric. Neck: No neck mass or thyromegaly  Lymph node: No cervical or supraclavicular lymphadenopathy  Neurology: Oriented x3. No focal neurological deficit  Cardiovascular: Regular. No murmur or S3. Respiratory: Bilateral rhonchi. Diminished breth sounds. Abdomen: Soft. No tenderness. No palpable mass. Extremities: No pedal edema. The feet are warm.     Electronically signed by Yue Gibson MD on 11/2/2021 at 10:18 AM  Rounding Hospitalist

## 2021-11-02 NOTE — PROGRESS NOTES
Pulmonary and Critical Care  Progress Note      VITALS:  /73   Pulse 75   Temp 98.9 °F (37.2 °C) (Axillary)   Resp (!) 32   Ht 5' 9\" (1.753 m)   Wt 231 lb 7.7 oz (105 kg)   SpO2 90%   BMI 34.18 kg/m²     Subjective:   Chief complaint: Acute hypoxemic respiratory failure, bilateral COVID-19 pneumonia, shortness of breath, pneumomediastinum, mild obesity  Moderate resp distress-no improvement over the past 24 hours  Patient awake and alert-lethargic but will respond appropriately  Remains on BiPAP with 75% FiO2  Objective:   PHYSICAL EXAM:    LUNGS: Scattered rhonchi in the mid to lower lung fields with a few rales. Suspect there is some mild subcu emphysema along with pneumomediastinum  O2 saturation 91% on BiPAP presently  No new lab  DATA:    CBC:  Recent Labs     10/30/21  1107 10/31/21  0932   WBC 14.7* 13.6*   RBC 4.66 4.77   HGB 14.8 15.2   HCT 44.4 45.2    355   MCV 95.3 94.8   MCH 31.8* 31.9*   MCHC 33.3 33.6   RDW 13.5 13.5   SEGSPCT 82.0* 84.0*   BANDSPCT 10 6      BMP:  Recent Labs     10/30/21  1107 10/31/21  0932    137   K 4.8 4.5    100   CO2 26 25   BUN 35* 32*   CREATININE 0.8* 0.7*   CALCIUM 8.7 8.9   GLUCOSE 148* 193*      ABG:  No results for input(s): PH, PO2ART, GCI9GXY, HCO3, BEART, O2SAT in the last 72 hours. BNP  No results found for: BNP   D-Dimer:  Lab Results   Component Value Date    DDIMER 837 (H) 10/29/2021      1. Radiology: Chest x-ray 11/1/2021 reviewed  Patchy airspace opacities bilaterally, may be related to pulmonary edema   versus pneumonia.       Stable pneumomediastinum       No definite pneumothorax. Assessment:     Patient Active Problem List   Diagnosis    Pneumonia due to COVID-19 virus       Plan:   1. continue present treatment  2. Continue BiPAP with high FiO2 to maintain oxygen saturation greater than 88%  3.  Repeat chest x-ray in a.m. to watch for worsening pneumomediastinum or subcu emphysema    Willie Islas MD MD  11/2/2021  7:56 AM

## 2021-11-03 NOTE — PROGRESS NOTES
Hospitalist Progress Note       11/3/2021 12:42 PM  Admit Date: 10/26/2021    PCP: Lillian Harrison MD     Assessment and Plan:   1. Acute hypoxic respiratory failure: Due to COVID-19 pneumonia. On BiPAP. Pulmonary following. 2.  COVID-19 pneumonia: Continue dexamethasone and baricitinib. Monitor LDH and CRP. 3.  Chest pain: Related to COVID-19 pneumonia. CTA on 10/27-no pulmonary embolism. Resolved. 4.  Hypertension: On Cozaar and atenolol. 5.  Hyperlipidemia: Continue statin. DVT prophylaxis: Lovenox  Disposition:    Patient Active Problem List:     Pneumonia due to COVID-19 virus      Subjective:     Chief Complaint   Patient presents with    Other     covid + low 02 at home symptoms started last thursday tested positive this weekend.  Shortness of Breath       F/U:  Interval History:     Remains on BiPAP. Denies chest pain, fever, chills    Objective: Intake/Output Summary (Last 24 hours) at 11/3/2021 1242  Last data filed at 11/3/2021 4483  Gross per 24 hour   Intake 80 ml   Output 1350 ml   Net -1270 ml      Vitals:   Vitals:    11/03/21 1230   BP:    Pulse:    Resp: 23   Temp:    SpO2: (!) 89%     Physical Exam:  General: On BiPAP. Eyes: Not pale. Anicteric. Neck: No neck mass or thyromegaly  Lymph node: No cervical or supraclavicular lymphadenopathy  Neurology: Oriented x3. No focal neurological deficit  Cardiovascular: Regular. No murmur or S3. Respiratory: Bilateral rhonchi. Diminished breth sounds. Abdomen: Soft. No tenderness. No palpable mass. Extremities: No pedal edema. The feet are warm.     Electronically signed by Elvis Calderon MD on 11/3/2021 at 12:42 PM  Rounding Hospitalist

## 2021-11-03 NOTE — PROGRESS NOTES
Pulmonary and Critical Care  Progress Note      VITALS:  /81   Pulse 74   Temp 99 °F (37.2 °C) (Axillary)   Resp 26   Ht 5' 9.02\" (1.753 m)   Wt 231 lb 7.7 oz (105 kg)   SpO2 93%   BMI 34.17 kg/m²     Subjective:   Chief complaint: Acute hypoxemic respiratory failure, bilateral COVID-19 pneumonia, shortness of breath, pneumomediastinum, mild obesity  Moderate  resp distress-remains on BiPAP 22/12 with 100% oxygen  Patient awake but sleepy. Is arousable and will answer questions appropriately    Objective:   PHYSICAL EXAM:    LUNGS: Diminished breath sounds throughout all lung areas with basilar rales. No wheezing is noted  WBC 20.8 with hemoglobin 15.2  No ABGs on BiPAP  O2 saturation recorded at 93% on above settings  Low-grade temp 99    DATA:    CBC:  Recent Labs     11/02/21  1251 11/03/21  0943   WBC 16.3* 20.8*   RBC 4.80 4.86   HGB 15.0 15.2   HCT 45.2 46.6    344   MCV 94.2 95.9   MCH 31.3* 31.3*   MCHC 33.2 32.6   RDW 13.2 13.3   SEGSPCT 85.0*  --    BANDSPCT 6  --       BMP:  Recent Labs     11/02/21  1251      K 5.2*   CL 99   CO2 25   BUN 31*   CREATININE 1.0   CALCIUM 8.9   GLUCOSE 118*      ABG:  No results for input(s): PH, PO2ART, UGV0QDD, HCO3, BEART, O2SAT in the last 72 hours. BNP  No results found for: BNP   D-Dimer:  Lab Results   Component Value Date    DDIMER 837 (H) 10/29/2021      1. Radiology: Chest x-ray this a.m. reviewed  1. Extensive bilateral airspace opacities without significant change. 2. Pneumomediastinum, pneumopericardium, and trace biapical pneumothoraces   without significant change. Assessment:     Patient Active Problem List   Diagnosis    Pneumonia due to COVID-19 virus       Plan:   1. continue present treatment  2. He may require intubation mechanical ventilation in the near future if oxygenation gets any worse.   3. Overall prognosis guarded    Abdirahman Chang MD MD  11/3/2021  11:25 AM

## 2021-11-03 NOTE — ADT AUTH CERT
[facility], Acute - Care Day 7 (11/2/2021) by Trini Purcell RN       Review Status Review Entered   Completed 11/3/2021 12:55      Criteria Review      Care Day: 7 Care Date: 11/2/2021 Level of Care: Inpatient Floor    Guideline Day 2    Level Of Care    (X) Floor    Clinical Status    (X) * Hypotension absent    11/3/2021 12:55 PM EDT by Graeme Reno      VITALS:  /73   Pulse 75   Temp 98.9 °F (37.2 °C) (Axillary)   Resp (!) 32    (X) * No requirement for mechanical ventilation    ( ) * Oxygenation at baseline or improved    11/3/2021 12:55 PM EDT by Kuldip Corley remains on bipap and high flow    (X) * Mental status at baseline    Routes    (X) * Oral hydration    (X) Oral or IV medications    Interventions    (X) Possible isolation    (X) Pulse oximetry    (X) Possible oxygen    Medications    (X) Possible antipyretic medication    (X) Possible DVT prophylaxis    * Milestone   Additional Notes   11/2      Subjective:   Chief complaint: Acute hypoxemic respiratory failure, bilateral COVID-19 pneumonia, shortness of breath, pneumomediastinum, mild obesity   Moderate resp distress-no improvement over the past 24 hours   Patient awake and alert-lethargic but will respond appropriately   Remains on BiPAP with 75% FiO2   Objective:   PHYSICAL EXAM:     LUNGS: Scattered rhonchi in the mid to lower lung fields with a few rales. Suspect there is some mild subcu emphysema along with pneumomediastinum   O2 saturation 91% on BiPAP presently   No new lab            Pulm:       Assessment:       Patient Active Problem List   Diagnosis   · Pneumonia due to COVID-19 virus           Plan:   1. continue present treatment   2. Continue BiPAP with high FiO2 to maintain oxygen saturation greater than 88%   3.  Repeat chest x-ray in a.m. to watch for worsening pneumomediastinum or subcu emphysema             Medications   Iv protonix 40mg daily   Iv reglan 10mg x 3   Po cozaar 100mg daily   Sc lovenox 30mg bid   Po decadron 6mg daily   Po olumiant 4mg daily   Po lipitor 10mg daily   Po tenormin 50mg bid      [facility], Acute - Care Day 6 (11/1/2021) by Jose Anderson RN       Review Status Review Entered   Completed 11/3/2021 12:38      Criteria Review      Care Day: 6 Care Date: 11/1/2021 Level of Care: Inpatient Floor    Guideline Day 2    Level Of Care    (X) Floor    Clinical Status    (X) * Hypotension absent    11/3/2021 12:38 PM EDT by Saqib Palacios      VITALS:  BP (!) 135/99   Pulse 82   Temp 98 °F (36.7 °C) (Axillary)   Resp 20      (X) * No requirement for mechanical ventilation    ( ) * Oxygenation at baseline or improved    11/3/2021 12:38 PM EDT by Saqib Palacios      PAP and high flow    (X) * Mental status at baseline    Routes    (X) * Oral hydration    (X) Oral or IV medications    Interventions    (X) Possible isolation    11/3/2021 12:38 PM EDT by Saqib Palacios      droplet plus    (X) Pulse oximetry    11/3/2021 12:38 PM EDT by Saqib Palacios      continuous with vitals    (X) Possible oxygen    Medications    (X) Possible antipyretic medication    (X) Possible DVT prophylaxis    * Milestone   Additional Notes   11/1      Desaturated on high flow oxygen.  Placed on BiPAP.          VITALS:  BP (!) 135/99   Pulse 82   Temp 98 °F (36.7 °C) (Axillary)   Resp 20         PHYSICAL EXAM:     LUNGS: Fine basilar rales in the mid to lower lung fields.  No wheezing or rhonchi noted   O2 saturation currently 83% on high flow nasal oxygen   No new lab, WBC 13.6 on 10/31/2021      Chief complaint: Acute hypoxemic respiratory failure, bilateral COVID-19 pneumonia, shortness of breath, mild obesity   Mild to moderate resp distress   Patient awake and alert   Wore BiPAP most of the night but currently on high flow nasal oxygen with facemask trying to eat breakfast         Internal Medicine:   Cont Care         Pulm:   Assessment:       Patient Active Problem List   Diagnosis   · Pneumonia due to COVID-19 virus           Plan:   1. continue present treatment   2. Continue to use high flow high concentration oxygen to maintain O2 saturation greater than 86%   3. Repeat chest x-ray today             Nursing:   Pt call light answered, off SPO2.  Pt on 15L high flow, remained 58% when back on SPO2. Put back on bi-pap 100% FIo2, SPO2 now 89-90%         Medications:   Po tenormin 50mg bid   Po lipitor 10mg daily   Po olumiant 4mg daily   Po decadron 6mg daily   Sc lovenox 30mg bid   Po cozaar 100mg daily   Iv reglan 10mg q8h   Iv protonix 40mg daily         Po tylenol 650mg x 1   Po zofran 4mg x 1            [facility], Acute - Care Day 5 (10/31/2021) by Elvira Hendricks RN       Review Status Review Entered   Completed 11/3/2021 12:30      Criteria Review      Care Day: 5 Care Date: 10/31/2021 Level of Care: Inpatient Floor    Guideline Day 2    Level Of Care    (X) Floor    Clinical Status    (X) * Hypotension absent    (X) * No requirement for mechanical ventilation    ( ) * Oxygenation at baseline or improved    11/3/2021 12:30 PM EDT by Sarah Cameron      PAP/  high flow    (X) * Mental status at baseline    Routes    (X) * Oral hydration    (X) Oral or IV medications    Interventions    (X) Possible isolation    11/3/2021 12:30 PM EDT by Sarah Cameron      droplet plus    (X) Pulse oximetry    11/3/2021 12:30 PM EDT by Madie Caro still having periods of hypoxia 83-88%    (X) Possible oxygen    Medications    (X) Possible antipyretic medication    (X) Possible DVT prophylaxis    * Milestone   Additional Notes   10/31      VITALS:  BP (!) 140/75   Pulse 73   Temp 99.7 °F (37.6 °C) (Axillary)   Resp 19        Physical Exam:   General: On BiPAP. Eyes: Not pale.  Anicteric.    Neck: No neck mass or thyromegaly   Lymph node: No cervical or supraclavicular lymphadenopathy   Neurology: Oriented x3.  No focal neurological deficit   Cardiovascular: Regular.  No murmur or S3. Respiratory: Bilateral rhonchi.  No wheezes or crepitation   Abdomen: Soft.  No tenderness.  No palpable mass. Extremities: No pedal edema.  The feet are warm      Internal Medicine:   Chest pain: Related to COVID-19 pneumonia.  CTA on 10/27-no pulmonary embolism.  Resolved.          Pulm:   Assessment:       Patient Active Problem List   Diagnosis   · Pneumonia due to COVID-19 virus           Plan:   1. continue present treatment   2. Continue BiPAP with high FiO2 to keep O2 saturation greater than 90%   3. Repeat chest x-ray in a.m.    4. Continue aggressive bronchopulmonary toilet, IV Decadron and baricitinib                Medications:   Po tenormin 50mg bid   Po lipitor 10mg daily   Po olumiant 40mg daily   Po decadron 6mg daily   Sc lovenox 30mg bid   Po cozaar 100mg daily   Iv reglan 10mg q8h   Iv protonix 40mg daily      Po tylenol 650mg x 2                  [facility], Acute - Care Day 4 (10/30/2021) by Onesimo Alexander RN       Review Status Review Entered   Completed 11/3/2021 12:22      Criteria Review      Care Day: 4 Care Date: 10/30/2021 Level of Care: Inpatient Floor    Guideline Day 2    Level Of Care    (X) Floor    Clinical Status    (X) * Hypotension absent    ( ) * No requirement for mechanical ventilation    ( ) * Oxygenation at baseline or improved    11/3/2021 12:22 PM EDT by Jeffy Belt      PAP    (X) * Mental status at baseline    Routes    ( ) * Oral hydration    (X) Oral or IV medications    Interventions    (X) Possible isolation    11/3/2021 12:22 PM EDT by Jeffy Belt      droplet plus isolation    (X) Pulse oximetry    (X) Possible oxygen    Medications    (X) Possible antipyretic medication    (X) Possible DVT prophylaxis    * Milestone   Additional Notes   10/30      VITALS:  /74   Pulse 69   Temp 99.8 °F (37.7 °C) (Oral)   Resp 22             Subjective:   Chief complaint: Acute hypoxemic respiratory failure, bilateral COVID-19 pneumonia, shortness of breath   Mild to moderate resp distress-remains on BiPAP and currently on 70% FiO2         Pulm:   Assessment:       Patient Active Problem List   Diagnosis   · Pneumonia due to COVID-19 virus           Plan:   1. continue present treatment   2. Continue IV Decadron, baricitinib and aggressive bronchopulmonary toilet   3. Continue to wean FiO2 to keep O2 saturation greater than 90% and may be changed from BiPAP to air Vo or Vapotherm   4. Up in chair as much as possible         Internal  Medicine:   Assessment and Plan:   1.  Acute hypoxic respiratory failure: Due to COVID-19 pneumonia.  On BiPAP.  Pulmonary consult appreciated.       2.  COVID-19 pneumonia: Continue dexamethasone and baricitinib. Monitor LDH and CRP.       3.  Hypertension: On Cozaar and atenolol.

## 2021-11-04 NOTE — CARE COORDINATION
CM reviewed notes. Nursing attempted Vapotherm and pt did not elias well. Pt placed back on bipap.  CM will follow for discharge needs [facility]

## 2021-11-04 NOTE — PROGRESS NOTES
Hospitalist Progress Note       11/4/2021 11:10 AM  Admit Date: 10/26/2021    PCP: Jeovanny Merino MD     Assessment and Plan:   1. Acute hypoxic respiratory failure: Due to COVID-19 pneumonia. On BiPAP. Pulmonary following. 2.  COVID-19 pneumonia: Continue dexamethasone and baricitinib. Monitor LDH and CRP. 3.  Chest pain: Related to COVID-19 pneumonia. CTA on 10/27-no pulmonary embolism. Resolved. 4.  Hypertension: On Cozaar and atenolol. 5.  Hyperlipidemia: Continue statin. DVT prophylaxis: Lovenox  Disposition:    Patient Active Problem List:     Pneumonia due to COVID-19 virus      Subjective:     Chief Complaint   Patient presents with    Other     covid + low 02 at home symptoms started last thursday tested positive this weekend.  Shortness of Breath       F/U:  Interval History:     Remains on BiPAP. No new complaint. Denies chest pain, fever, chills    Objective: Intake/Output Summary (Last 24 hours) at 11/4/2021 1110  Last data filed at 11/4/2021 0843  Gross per 24 hour   Intake 10 ml   Output 1650 ml   Net -1640 ml      Vitals:   Vitals:    11/04/21 0800   BP: (!) 140/64   Pulse: 72   Resp: 26   Temp: 97.4 °F (36.3 °C)   SpO2: 95%     Physical Exam:  General: On BiPAP. Eyes: Not pale. Anicteric. Neck: No neck mass or thyromegaly  Lymph node: No cervical or supraclavicular lymphadenopathy  Neurology: Oriented x3. No focal neurological deficit  Cardiovascular: Regular. No murmur or S3. Respiratory: Bilateral rhonchi. Diminished breth sounds. Abdomen: Soft. No tenderness. No palpable mass. Extremities: No pedal edema. The feet are warm.     Electronically signed by Cornelio Escobedo MD on 11/4/2021 at 11:10 AM  Rounding Hospitalist

## 2021-11-04 NOTE — FLOWSHEET NOTE
Patient taken off bipap and put on vapotherm at 40L and 100% fiO2 for am meds and breakfast.  Patient unable to tolerate, oxgyen saturation would not come up past 84%. Patient stated he felt light headed. Patient placed back on bipap at 100% fiO2 for recovery. Will continue to monitor.

## 2021-11-04 NOTE — ADT AUTH CERT
mechanical ventilation    ( ) * Oxygenation at baseline or improved    11/3/2021 12:55 PM EDT by Chuck Urrutia remains on bipap and high flow    (X) * Mental status at baseline    Routes    (X) * Oral hydration    (X) Oral or IV medications    Interventions    (X) Possible isolation    (X) Pulse oximetry    (X) Possible oxygen    Medications    (X) Possible antipyretic medication    (X) Possible DVT prophylaxis    * Milestone   Additional Notes   11/2      Subjective:   Chief complaint: Acute hypoxemic respiratory failure, bilateral COVID-19 pneumonia, shortness of breath, pneumomediastinum, mild obesity   Moderate resp distress-no improvement over the past 24 hours   Patient awake and alert-lethargic but will respond appropriately   Remains on BiPAP with 75% FiO2   Objective:   PHYSICAL EXAM:     LUNGS: Scattered rhonchi in the mid to lower lung fields with a few rales. Suspect there is some mild subcu emphysema along with pneumomediastinum   O2 saturation 91% on BiPAP presently   No new lab            Pulm:       Assessment:       Patient Active Problem List   Diagnosis   · Pneumonia due to COVID-19 virus           Plan:   1. continue present treatment   2. Continue BiPAP with high FiO2 to maintain oxygen saturation greater than 88%   3.  Repeat chest x-ray in a.m. to watch for worsening pneumomediastinum or subcu emphysema             Medications   Iv protonix 40mg daily   Iv reglan 10mg x 3   Po cozaar 100mg daily   Sc lovenox 30mg bid   Po decadron 6mg daily   Po olumiant 4mg daily   Po lipitor 10mg daily   Po tenormin 50mg bid      [facility], Acute - Care Day 6 (11/1/2021) by Juvencio Gillette RN       Review Status Review Entered   Completed 11/3/2021 12:38      Criteria Review      Care Day: 6 Care Date: 11/1/2021 Level of Care: Inpatient Floor    Guideline Day 2    Level Of Care    (X) Floor    Clinical Status    (X) * Hypotension absent    11/3/2021 12:38 PM EDT by Natalya Roberson      VITALS:  BP (!) 135/99   Pulse 82   Temp 98 °F (36.7 °C) (Axillary)   Resp 20      (X) * No requirement for mechanical ventilation    ( ) * Oxygenation at baseline or improved    11/3/2021 12:38 PM EDT by Natalya Roberson      PAP and high flow    (X) * Mental status at baseline    Routes    (X) * Oral hydration    (X) Oral or IV medications    Interventions    (X) Possible isolation    11/3/2021 12:38 PM EDT by Natalya Roberson      droplet plus    (X) Pulse oximetry    11/3/2021 12:38 PM EDT by Natalya Roberson      continuous with vitals    (X) Possible oxygen    Medications    (X) Possible antipyretic medication    (X) Possible DVT prophylaxis    * Milestone   Additional Notes   11/1      Desaturated on high flow oxygen. Placed on BiPAP.          VITALS:  BP (!) 135/99   Pulse 82   Temp 98 °F (36.7 °C) (Axillary)   Resp 20         PHYSICAL EXAM:     LUNGS: Fine basilar rales in the mid to lower lung fields.  No wheezing or rhonchi noted   O2 saturation currently 83% on high flow nasal oxygen   No new lab, WBC 13.6 on 10/31/2021      Chief complaint: Acute hypoxemic respiratory failure, bilateral COVID-19 pneumonia, shortness of breath, mild obesity   Mild to moderate resp distress   Patient awake and alert   Wore BiPAP most of the night but currently on high flow nasal oxygen with facemask trying to eat breakfast         Internal Medicine:   Cont Care         Pulm:   Assessment:       Patient Active Problem List   Diagnosis   · Pneumonia due to COVID-19 virus           Plan:   1. continue present treatment   2. Continue to use high flow high concentration oxygen to maintain O2 saturation greater than 86%   3. Repeat chest x-ray today             Nursing:   Pt call light answered, off SPO2.  Pt on 15L high flow, remained 58% when back on SPO2.  Put back on bi-pap 100% FIo2, SPO2 now 89-90%         Medications:   Po tenormin 50mg bid   Po lipitor 10mg daily   Po olumiant 4mg daily   Po decadron 6mg daily   Sc lovenox 30mg bid   Po cozaar 100mg daily   Iv reglan 10mg q8h   Iv protonix 40mg daily         Po tylenol 650mg x 1   Po zofran 4mg x 1            [facility], Acute - Care Day 5 (10/31/2021) by Izabel Cabrales RN       Review Status Review Entered   Completed 11/3/2021 12:30      Criteria Review      Care Day: 5 Care Date: 10/31/2021 Level of Care: Inpatient Floor    Guideline Day 2    Level Of Care    (X) Floor    Clinical Status    (X) * Hypotension absent    (X) * No requirement for mechanical ventilation    ( ) * Oxygenation at baseline or improved    11/3/2021 12:30 PM EDT by Rakan Harley      PAP/  high flow    (X) * Mental status at baseline    Routes    (X) * Oral hydration    (X) Oral or IV medications    Interventions    (X) Possible isolation    11/3/2021 12:30 PM EDT by Rakan Harley      droplet plus    (X) Pulse oximetry    11/3/2021 12:30 PM EDT by Shanel Palmer still having periods of hypoxia 83-88%    (X) Possible oxygen    Medications    (X) Possible antipyretic medication    (X) Possible DVT prophylaxis    * Milestone   Additional Notes   10/31      VITALS:  BP (!) 140/75   Pulse 73   Temp 99.7 °F (37.6 °C) (Axillary)   Resp 19        Physical Exam:   General: On BiPAP. Eyes: Not pale.  Anicteric. Neck: No neck mass or thyromegaly   Lymph node: No cervical or supraclavicular lymphadenopathy   Neurology: Oriented x3.  No focal neurological deficit   Cardiovascular: Regular.  No murmur or S3. Respiratory: Bilateral rhonchi.  No wheezes or crepitation   Abdomen: Soft.  No tenderness.  No palpable mass. Extremities: No pedal edema.  The feet are warm      Internal Medicine:   Chest pain: Related to COVID-19 pneumonia.  CTA on 10/27-no pulmonary embolism.  Resolved.          Pulm:   Assessment:       Patient Active Problem List   Diagnosis   · Pneumonia due to COVID-19 virus           Plan:   1. continue present treatment   2.  Continue BiPAP with high FiO2 to keep O2 saturation greater than 90%   3. Repeat chest x-ray in a.m. 4. Continue aggressive bronchopulmonary toilet, IV Decadron and baricitinib                Medications:   Po tenormin 50mg bid   Po lipitor 10mg daily   Po olumiant 40mg daily   Po decadron 6mg daily   Sc lovenox 30mg bid   Po cozaar 100mg daily   Iv reglan 10mg q8h   Iv protonix 40mg daily      Po tylenol 650mg x 2                  [facility] Illness, Acute - Care Day 4 (10/30/2021) by Forest Perea RN       Review Status Review Entered   Completed 11/3/2021 12:22      Criteria Review      Care Day: 4 Care Date: 10/30/2021 Level of Care: Inpatient Floor    Guideline Day 2    Level Of Care    (X) Floor    Clinical Status    (X) * Hypotension absent    ( ) * No requirement for mechanical ventilation    ( ) * Oxygenation at baseline or improved    11/3/2021 12:22 PM EDT by Vicki Barnes      PAP    (X) * Mental status at baseline    Routes    ( ) * Oral hydration    (X) Oral or IV medications    Interventions    (X) Possible isolation    11/3/2021 12:22 PM EDT by Vicki Barnes      droplet plus isolation    (X) Pulse oximetry    (X) Possible oxygen    Medications    (X) Possible antipyretic medication    (X) Possible DVT prophylaxis    * Milestone   Additional Notes   10/30      VITALS:  /74   Pulse 69   Temp 99.8 °F (37.7 °C) (Oral)   Resp 22             Subjective:   Chief complaint: Acute hypoxemic respiratory failure, bilateral COVID-19 pneumonia, shortness of breath   Mild to moderate resp distress-remains on BiPAP and currently on 70% FiO2         Pulm:   Assessment:       Patient Active Problem List   Diagnosis   · Pneumonia due to COVID-19 virus           Plan:   1. continue present treatment   2. Continue IV Decadron, baricitinib and aggressive bronchopulmonary toilet   3. Continue to wean FiO2 to keep O2 saturation greater than 90% and may be changed from BiPAP to air Vo or Vapotherm   4.  Up in chair as much as possible Internal  Medicine:   Assessment and Plan:   1.  Acute hypoxic respiratory failure: Due to COVID-19 pneumonia.  On BiPAP.  Pulmonary consult appreciated.       2.  COVID-19 pneumonia: Continue dexamethasone and baricitinib.  Monitor LDH and CRP.       3.  Hypertension: On Cozaar and atenolol

## 2021-11-04 NOTE — PROGRESS NOTES
Pulmonary and Critical Care  Progress Note      VITALS:  /66   Pulse 63   Temp 97.4 °F (36.3 °C) (Oral)   Resp 24   Ht 5' 9.02\" (1.753 m)   Wt 231 lb 7.7 oz (105 kg)   SpO2 91%   BMI 34.17 kg/m²     Subjective:   CHIEF COMPLAINT :SOB     HPI:                The patient is lying in the bed. He is in mild resp distress    Objective:   PHYSICAL EXAM:    LUNGS:Occasional basal crackles  Abd-soft, BS+,NT  Ext- no pedal edema  CVS-s1s2, no murmurs      DATA:    CBC:  Recent Labs     11/02/21  1251 11/03/21  0943 11/04/21  0948   WBC 16.3* 20.8* 17.0*   RBC 4.80 4.86 4.97   HGB 15.0 15.2 16.1   HCT 45.2 46.6 53.7*    344 294   MCV 94.2 95.9 108.0*   MCH 31.3* 31.3* 32.4*   MCHC 33.2 32.6 30.0*   RDW 13.2 13.3 13.4   SEGSPCT 85.0* 83.0* 91.0*   BANDSPCT 6 4* 4*      BMP:  Recent Labs     11/02/21  1251 11/03/21  0943 11/04/21  0948    136 137   K 5.2* 4.6 5.1   CL 99 97* 99   CO2 25 25 24   BUN 31* 32* 34*   CREATININE 1.0 1.0 1.0   CALCIUM 8.9 9.0 9.1   GLUCOSE 118* 82 110*      ABG:  Recent Labs     11/03/21  1145   PH 7.44   PO2ART 54*   OKV2FWA 40.0   O2SAT 88.9*     BNP  No results found for: BNP   D-Dimer:  Lab Results   Component Value Date    DDIMER 837 (H) 10/29/2021      Radiology:   1. Diffuse bilateral airspace opacities without significant change. 2. Trace biapical pneumothoraces pneumomediastinum and pneumopericardium with   subcutaneous emphysematous changes involving the base the neck.  No   significant change. 1.       Assessment/Plan     Patient Active Problem List    Diagnosis Date Noted    Pneumonia due to COVID-19 virus 10/27/2021   Acute Hypoxic resp failure  Bilateral Pneumonia sec to COVID-19  Obesity       1. Barcitinib  2. Decadron  3. Insulin  4. Inhalers  5. Keep sats > 92%  6. ICS  7. OOB  8. PT/OT  9.  C/w present management    Electronically signed by Karri Granados MD on 11/4/2021 at 1:25 PM

## 2021-11-05 NOTE — PROGRESS NOTES
Hospitalist Progress Note       11/5/2021 8:15 AM  Admit Date: 10/26/2021    PCP: Sarbjit Mims MD     Assessment and Plan:   1. Acute hypoxic respiratory failure: Due to COVID-19 pneumonia. On BiPAP. Pulmonary following. 2.  COVID-19 pneumonia: Continue dexamethasone and baricitinib. Monitor LDH and CRP. 3.  Chest pain: Related to COVID-19 pneumonia. CTA on 10/27-no pulmonary embolism. Resolved. 4.  Hypertension: On Cozaar and atenolol. 5.  Hyperlipidemia: Continue statin. DVT prophylaxis: Lovenox  Disposition:    Patient Active Problem List:     Pneumonia due to COVID-19 virus      Subjective:     Chief Complaint   Patient presents with    Other     covid + low 02 at home symptoms started last thursday tested positive this weekend.  Shortness of Breath       F/U:  Interval History:     Is still on BiPAP. Denies chest pain, fever, chills    Objective: Intake/Output Summary (Last 24 hours) at 11/5/2021 0815  Last data filed at 11/5/2021 0809  Gross per 24 hour   Intake 20 ml   Output 2300 ml   Net -2280 ml      Vitals:   Vitals:    11/05/21 0803   BP:    Pulse: 80   Resp:    Temp:    SpO2:      Physical Exam:  General: On BiPAP. Eyes: Not pale. Anicteric. Neck: No neck mass or thyromegaly  Lymph node: No cervical or supraclavicular lymphadenopathy  Neurology: Oriented x3. No focal neurological deficit  Cardiovascular: Regular. No murmur or S3. Respiratory: Bilateral rhonchi. Diminished breth sounds. Abdomen: Soft. No tenderness. No palpable mass. Extremities: No pedal edema. The feet are warm.     Electronically signed by Ara Manjarrez MD on 11/5/2021 at 8:15 AM  Rounding Hospitalist

## 2021-11-05 NOTE — PLAN OF CARE
Nutrition Problem #1: Inadequate oral intake  Intervention: Food and/or Nutrient Delivery: Continue Current Diet, Modify Oral Nutrition Supplement  Nutritional Goals: Pt will consume greater than 50% of meals and supplements or EN be initiated to provide adequate nutrition

## 2021-11-05 NOTE — PROGRESS NOTES
Comprehensive Nutrition Assessment    Type and Reason for Visit:  Reassess    Nutrition Recommendations/Plan:   · Add fortified pudding oral nutrition supplement  · Continue current diet and supplement regimen as ordered  · If pt unable to meet greater than 50% of estimated energy needs via po intake, recommend initiating EN to provide adequate nutrition  · Please obtain updated measured weight so that nutrition status can be more accurately assessed  Will monitor po intake, weight trends, poc    Nutrition Assessment:  pt on regular diet with oral nutrition supplements, no po intake documented in the past 72 hr, pt currently on heated high flow % Fio2, pt unavailable for telephone interview, will continue to follow pt at high nutrition risk    Malnutrition Assessment:  Malnutrition Status: At risk for malnutrition (Comment)    Context:  Acute Illness       Estimated Daily Nutrient Needs:  Energy (kcal):  1180-4043 ([address] w/ stress factor 1.0-1.2); Weight Used for Energy Requirements:  Current     Protein (g):   (1.2-1.4 g/kg); Weight Used for Protein Requirements:  Ideal        Fluid (ml/day):  1800; Method Used for Fluid Requirements:  1 ml/kcal      Nutrition Related Findings:  Labs; WBC 20.6, Meds; Reglan, Decadron      Wounds:  None       Current Nutrition Therapies:    ADULT DIET; Regular  ADULT ORAL NUTRITION SUPPLEMENT; Breakfast, Lunch, Dinner; Standard High Calorie/High Protein Oral Supplement    Anthropometric Measures:  · Height: 5' 9.02\" (175.3 cm)  · Current Body Weight: 231 lb 7.7 oz (105 kg) ((11/5))   · Admission Body Weight: 242 lb 15.2 oz (110.2 kg) (first measured weight)    · Ideal Body Weight: 160 lbs; % Ideal Body Weight 144.7 %   · BMI: 34.2  · BMI Categories: Obese Class 1 (BMI 30.0-34. 9)       Nutrition Diagnosis:   · Inadequate oral intake related to acute injury/trauma as evidenced by weight loss    Nutrition Interventions:   Food and/or Nutrient Delivery: Continue Current Diet, Modify Oral Nutrition Supplement  Nutrition Education/Counseling:  No recommendation at this time   Coordination of Nutrition Care:  Continue to monitor while inpatient    Goals:  Pt will consume greater than 50% of meals and supplements or EN be initiated to provide adequate nutrition       Nutrition Monitoring and Evaluation:   Behavioral-Environmental Outcomes:  None Identified   Food/Nutrient Intake Outcomes:  Supplement Intake, Food and Nutrient Intake  Physical Signs/Symptoms Outcomes:  Biochemical Data, Diarrhea, Hemodynamic Status, Fluid Status or Edema, Weight, Skin     Discharge Planning:     Too soon to determine     Electronically signed by Igor Mistry, MS, RD, LD on 11/5/21 at 12:17 PM EDT    Contact: [phone]

## 2021-11-06 NOTE — PROGRESS NOTES
Hospitalist Progress Note       11/6/2021 10:44 AM  Admit Date: 10/26/2021    PCP: Chelsey Pappas MD     Assessment and Plan:   1. Acute hypoxic respiratory failure: Due to COVID-19 pneumonia. On BiPAP. Pulmonary following. 2.  COVID-19 pneumonia: Continue dexamethasone and baricitinib. 3.  Chest pain: Related to COVID-19 pneumonia. CTA on 10/27-no pulmonary embolism. Resolved. 4.  Hypertension: On Cozaar and atenolol. 5.  Hyperlipidemia: Continue statin. DVT prophylaxis: Lovenox  Disposition:    Patient Active Problem List:     Pneumonia due to COVID-19 virus      Subjective:     Chief Complaint   Patient presents with    Other     covid + low 02 at home symptoms started last thursday tested positive this weekend.  Shortness of Breath       F/U:  Interval History:     On BiPAP. No new complaint. Denies chest pain, fever, chills    Objective: Intake/Output Summary (Last 24 hours) at 11/6/2021 1044  Last data filed at 11/6/2021 0300  Gross per 24 hour   Intake --   Output 1300 ml   Net -1300 ml      Vitals:   Vitals:    11/06/21 1000   BP: 118/76   Pulse: 85   Resp: 29   Temp:    SpO2: 91%     Physical Exam:  General: On BiPAP. Eyes: Not pale. Anicteric. Neck: No neck mass or thyromegaly  Lymph node: No cervical or supraclavicular lymphadenopathy  Neurology: Oriented x3. No focal neurological deficit  Cardiovascular: Regular. No murmur or S3. Respiratory: Bilateral rhonchi. Diminished breth sounds. Abdomen: Soft. No tenderness. No palpable mass. Extremities: No pedal edema. The feet are warm.     Electronically signed by Juan Madera MD on 11/6/2021 at 10:44 AM  Rounding Hospitalist

## 2021-11-06 NOTE — PROGRESS NOTES
Pulmonary and Critical Care  Progress Note      VITALS:  /82   Pulse 75   Temp 101.3 °F (38.5 °C) (Axillary)   Resp 27   Ht 5' 9.02\" (1.753 m)   Wt 209 lb 7 oz (95 kg)   SpO2 91%   BMI 30.91 kg/m²     Subjective:   CHIEF COMPLAINT :SOB     HPI:                The patient is sitting in the bed. He is in mild resp distress    Objective:   PHYSICAL EXAM:    LUNGS:Occasional basal crackles  Abd-soft, BS+,NT  Ext- no pedal edema  CVS-s1s2, no murmurs      DATA:    CBC:  Recent Labs     11/04/21  0948 11/05/21  0916 11/06/21  0807   WBC 17.0* 20.6* 22.2*   RBC 4.97 4.87 5.10   HGB 16.1 15.4 16.0   HCT 53.7* 47.4 50.2    366 403   .0* 97.3 98.4   MCH 32.4* 31.6* 31.4*   MCHC 30.0* 32.5 31.9*   RDW 13.4 13.3 13.2   SEGSPCT 91.0* 89.3* 88.5*   BANDSPCT 4*  --   --       BMP:  Recent Labs     11/04/21  0948 11/05/21  0916 11/06/21  0807    141 142   K 5.1 4.7 4.7   CL 99 101 101   CO2 24 25 28   BUN 34* 37* 37*   CREATININE 1.0 1.1 1.0   CALCIUM 9.1 9.4 9.4   GLUCOSE 110* 128* 93      ABG:  No results for input(s): PH, PO2ART, UIL1ROH, HCO3, BEART, O2SAT in the last 72 hours. BNP  No results found for: BNP   D-Dimer:  Lab Results   Component Value Date    DDIMER 837 (H) 10/29/2021      1. Radiology: None      Assessment/Plan     Patient Active Problem List    Diagnosis Date Noted    Pneumonia due to COVID-19 virus 10/27/2021   Acute Hypoxic resp failure- slowly improving  Bilateral Pneumonia sec to COVID-19  Obesity       1. Barcitinib  2. Decadron  3. Insulin  4. Inhalers  5. ICS  6. OOB  7. Prone ventilation as tolerated  8. Keep sats > 92%  9.  C.w present management    Electronically signed by Joseph Calderon MD on 11/6/2021 at 12:29 PM

## 2021-11-07 NOTE — PROGRESS NOTES
Attempted to deflate balloon and withdraw dalton per pt request. Was unable to deflate at this time. Charge nurse attempted and was unsuccessful.  Notified hospitalist.

## 2021-11-07 NOTE — PROGRESS NOTES
Pulmonary and Critical Care  Progress Note      VITALS:  BP 94/70   Pulse 73   Temp 99.3 °F (37.4 °C) (Axillary)   Resp 24   Ht 5' 9.02\" (1.753 m)   Wt 209 lb 7 oz (95 kg)   SpO2 93%   BMI 30.91 kg/m²     Subjective:   CHIEF COMPLAINT :SOB     HPI:                The patient is lying in the bed. He is on the BIPAP. He is in mild resp distress    Objective:   PHYSICAL EXAM:    LUNGS:Occasional basal crackles  Abd-soft, BS+,NT  Ext- no pedal edema  CVS-s1s2, no murmurs      DATA:    CBC:  Recent Labs     11/05/21  0916 11/06/21  0807 11/07/21  0718   WBC 20.6* 22.2* 18.7*   RBC 4.87 5.10 5.05   HGB 15.4 16.0 15.9   HCT 47.4 50.2 50.6    403 334   MCV 97.3 98.4 100.2*   MCH 31.6* 31.4* 31.5*   MCHC 32.5 31.9* 31.4*   RDW 13.3 13.2 13.2   SEGSPCT 89.3* 88.5* 88.7*      BMP:  Recent Labs     11/05/21  0916 11/06/21  0807 11/07/21  0718    142 139   K 4.7 4.7 5.1    101 102   CO2 25 28 27   BUN 37* 37* 49*   CREATININE 1.1 1.0 1.4*   CALCIUM 9.4 9.4 9.3   GLUCOSE 128* 93 96      ABG:  Recent Labs     11/07/21  0200   PH 7.38   PO2ART 69*   PFO6FFG 49.0*   O2SAT 91.3*     BNP  No results found for: BNP   D-Dimer:  Lab Results   Component Value Date    DDIMER 837 (H) 10/29/2021      1. Radiology: None      Assessment/Plan     Patient Active Problem List    Diagnosis Date Noted    Pneumonia due to COVID-19 virus 10/27/2021   Acute Hypoxic resp failure- slowly improving  Bilateral Pneumonia sec to COVID-19  Obesity       1. Barcitinib  2. Inhalers  3. ICS  4. OOB  5. Prone ventilation as tolerated  6. Keep sats > 92%  7. BIPAP  8.  C/w present management    Electronically signed by Riley Reynolds MD on 11/7/2021 at 1:50 PM

## 2021-11-07 NOTE — PROGRESS NOTES
Straight cath performed, 400 mL urine return. Urine was an omar color and 1-2 small clots were noted.

## 2021-11-07 NOTE — PROGRESS NOTES
Pts bed alarm was going off, RN and NA entered the room at this time. Pt SPo2 in the 70s on 100% BIPAP. Pt was pulling at dalton complaining of pain. The dalton securement device was ripped off. Pt was assisted back to bed. Pt described pain as internal and not on the shaft of penis. Tip of the penis bleeding at this time. Hospitalist notified for pain medication.

## 2021-11-07 NOTE — PROGRESS NOTES
Hospitalist Progress Note       11/7/2021 4:14 PM  Admit Date: 10/26/2021    PCP: Sarbjit Mims MD     Assessment and Plan:   1. Acute hypoxic respiratory failure: Due to COVID-19 pneumonia. Remains on BiPAP. Pulmonary following. 2.  COVID-19 pneumonia: Continue dexamethasone and baricitinib. 3.  Chest pain: Related to COVID-19 pneumonia. CTA on 10/27-no pulmonary embolism. Resolved. 4.  Hypertension: On Cozaar and atenolol. 5.  Hyperlipidemia: Continue statin. 6. Hematuria. Urology consulted    DVT prophylaxis: Lovenox  Disposition:    Patient Active Problem List:     Pneumonia due to COVID-19 virus      Subjective:     Chief Complaint   Patient presents with    Other     covid + low 02 at home symptoms started last thursday tested positive this weekend.  Shortness of Breath       F/U:  Interval History:     On BiPAP. No significant improvement. Had hematuria during the night, retained dalton catheter. Urology consulted. Denies chest pain, fever, chills    Objective: Intake/Output Summary (Last 24 hours) at 11/7/2021 1614  Last data filed at 11/7/2021 0900  Gross per 24 hour   Intake --   Output 1750 ml   Net -1750 ml      Vitals:   Vitals:    11/07/21 1553   BP: 113/78   Pulse: 73   Resp: 30   Temp: 99 °F (37.2 °C)   SpO2: (!) 87%     Physical Exam:  General: On BiPAP. Eyes: Not pale. Anicteric. Neck: No neck mass or thyromegaly  Lymph node: No cervical or supraclavicular lymphadenopathy  Neurology: Oriented x3. No focal neurological deficit  Cardiovascular: Regular. No murmur or S3. Respiratory: Bilateral rhonchi. Diminished breth sounds. Abdomen: Soft. No tenderness. No palpable mass. Extremities: No pedal edema. The feet are warm.     Electronically signed by Ara Manjarrez MD on 11/7/2021 at [address] [facility]

## 2021-11-07 NOTE — PROGRESS NOTES
11/07/21 0426   NIV Type   NIV Started/Stopped On   Equipment Type v60   Mode Bilevel   Mask Type Full face mask   Mask Size Medium   Settings/Measurements   IPAP 20 cmH20   CPAP/EPAP 10 cmH2O   Rate Ordered 12   Resp 20   FiO2  100 %   I Time/ I Time % 1 s   Vt Exhaled 419 mL   Minute Volume 11.6 Liters   Comfort Level Good   Using Accessory Muscles No   SpO2 93   Alarm Settings   Alarms On Y

## 2021-11-07 NOTE — CONSULTS
[facility] Hospital[facility], [address]   Consult Note  [facility] 1 2 3 4 5    Date: 2021   Patient: Thi Heller   : 1955   DOA: 10/26/2021   MRN: [id number]   ROOM#: [id number]     Reason for Consult: Retained catheter  Requesting Physician:  Maury Grier  Collaborating Urologist on Call at time of admission: Dr. Latia Albert: Shortness of breath and cough    History Obtained From:  patient, non-family caregiver - RN, electronic medical record    HISTORY OF PRESENT ILLNESS:                The patient is a 78 y.o. male with significant past medical history of HLD and HTN who presented with shortness of breath and cough on 10/26/21. Work-up revealed hypoxia and pneumonia secondary to Covid-19. Last night, pt was trying to use a urinal and his O2 levels dropped to 27% (earlier 100% on bipap). A dalton catheter was easily placed with 150cc clear yellow urine return. About 1 hr later, pt was noted pulling on his catheter with most of the catheter out. Nursing staff were unable to deflate dalton balloon and fully remove catheter. CT pelvis showed catheter balloon inflated within distal penile urethra. Upon examination, the balloon was easily palpated ~2in from urethral meatus and unable to be deflated via inflation port. I also attempted to deflate the balloon by cutting inflation port with scissors and then inserting a guidewire through the port without success. Decision for balloon puncture with needle. I sterilely injected 1ml of lidocaine 1% into the corpus spongiosum and then carefully used the 23g needle to puncture the balloon, which immediately burst and catheter was then easily able to be removed. I then applied pressure to perineum and penile urethra to help control the bleeding, which resolved after 2-3 minutes. The pt tolerated well with no complications.  Decision to avoid catheter reinsertion at this time and perform periodic bladder scans to ensure proper bladder emptying. Past Medical History:        Diagnosis Date    Hyperlipidemia     Hypertension      Past Surgical History:        Procedure Laterality Date    ANKLE SURGERY      HAND SURGERY      KNEE SURGERY       Current Medications:   Current Facility-Administered Medications: tamsulosin (FLOMAX) capsule 0.4 mg, 0.4 mg, Oral, Daily  atenolol (TENORMIN) tablet 50 mg, 50 mg, Oral, BID  metoclopramide (REGLAN) injection 10 mg, 10 mg, IntraVENous, Q8H  0.9 % sodium chloride infusion, 25 mL, IntraVENous, PRN  pantoprazole (PROTONIX) injection 40 mg, 40 mg, IntraVENous, Daily  baricitinib (OLUMIANT) tablet 4 mg, 4 mg, Oral, Daily  losartan (COZAAR) tablet 100 mg, 100 mg, Oral, Daily  atorvastatin (LIPITOR) tablet 10 mg, 10 mg, Oral, Daily  sodium chloride flush 0.9 % injection 5-40 mL, 5-40 mL, IntraVENous, 2 times per day  sodium chloride flush 0.9 % injection 5-40 mL, 5-40 mL, IntraVENous, PRN  enoxaparin (LOVENOX) injection 30 mg, 30 mg, SubCUTAneous, BID  ondansetron (ZOFRAN-ODT) disintegrating tablet 4 mg, 4 mg, Oral, Q8H PRN **OR** ondansetron (ZOFRAN) injection 4 mg, 4 mg, IntraVENous, Q6H PRN  polyethylene glycol (GLYCOLAX) packet 17 g, 17 g, Oral, Daily PRN  acetaminophen (TYLENOL) tablet 650 mg, 650 mg, Oral, Q6H PRN **OR** acetaminophen (TYLENOL) suppository 650 mg, 650 mg, Rectal, Q6H PRN    Allergies:  Amoxicillin    Social History:   TOBACCO:   reports that he has never smoked. He does not have any smokeless tobacco history on file. ETOH:   reports current alcohol use. DRUGS:   reports no history of drug use. Family History:   History reviewed. No pertinent family history.     REVIEW OF SYSTEMS:     CONSTITUTIONAL:  positive for  fatigue  RESPIRATORY:  positive for  dyspnea  CARDIOVASCULAR:  negative  GASTROINTESTINAL:  negative  GENITOURINARY:  positive for hesitancy and decreased stream    PHYSICAL EXAM:      VITALS:  /67   Pulse 74   Temp 99.4 °F (37.4 °C) (Axillary)   Resp 20   Ht 5' 9.02\" (1.753 m)   Wt 209 lb 7 oz (95 kg)   SpO2 93%   BMI 30.91 kg/m²      TEMPERATURE:  Current - Temp: 99.4 °F (37.4 °C); Max - Temp  Av.8 °F (37.7 °C)  Min: 99 °F (37.2 °C)  Max: 101.3 °F (38.5 °C)  24HR BLOOD PRESSURE RANGE:  Systolic (28RYB), BBX:219 , Min:82 , LLY:500   ; Diastolic (08SEA), XEU:62, Min:54, Max:93    General appearance: appears stated age, cooperative, fatigued, mild distress and mildly obese  Head: Normocephalic, without obvious abnormality, atraumatic, Bipap in place  Back: No CVA tenderness  Abdomen: Soft, non-tender, non-distended   : Circumcised phallus with scant blood noted per urethra. Gómez in place with inflated balloon palpated ~2 inches from meatus. Scrotum and scrotal contents normal.    DATA:    WBC:    Lab Results   Component Value Date    WBC 22.2 2021     Hemoglobin/Hematocrit:    Lab Results   Component Value Date    HGB 16.0 2021    HCT 50.2 2021     BMP:    Lab Results   Component Value Date     2021    K 4.7 2021     2021    CO2 28 2021    BUN 37 2021    LABALBU 3.6 2021    CREATININE 1.0 2021    CALCIUM 9.4 2021    GFRAA >60 2021    LABGLOM >60 2021     PT/INR:    Lab Results   Component Value Date    PROTIME 13.5 10/28/2021    INR 1.05 10/28/2021     Imaging:  XR ABDOMEN (KUB) (SINGLE AP VIEW)    Result Date: 2021  EXAMINATION: ONE SUPINE XRAY VIEW(S) OF THE ABDOMEN 2021 9:04 pm COMPARISON: Lumbar spine radiographs 09/15/2015 HISTORY: ORDERING SYSTEM PROVIDED HISTORY: abdominal pain TECHNOLOGIST PROVIDED HISTORY: Reason for exam:->abdominal pain Reason for Exam: abdominal pain Acuity: Acute Type of Exam: Initial Additional signs and symptoms: na Relevant Medical/Surgical History: na FINDINGS: 2 images submitted for review. Nonspecific, nonobstructive bowel gas pattern. Punctate calcifications project over the kidneys. No acute osseous abnormality is seen. There are degenerative changes in the lumbar spine. Pelvic phleboliths are redemonstrated. 1. No evidence of bowel obstruction. 2. Suspected bilateral nephrolithiasis. CT PELVIS WO CONTRAST Additional Contrast? None    Result Date: 11/7/2021  EXAMINATION: CT OF THE PELVIS WITHOUT CONTRAST 11/7/2021 1:29 am TECHNIQUE: CT of the pelvis was performed without the administration of intravenous contrast.  Multiplanar reformatted images are provided for review. Adjustment of mA and/or kV according to patient size was utilized. Dose modulation, iterative reconstruction, and/or weight based adjustment of the mA/kV was utilized to reduce the radiation dose to as low as reasonably achievable. COMPARISON: None. HISTORY: ORDERING SYSTEM PROVIDED HISTORY: Dalton TECHNOLOGIST PROVIDED HISTORY: Reason for exam:->Dalton Additional Contrast?->None Reason for Exam: pulled on dalton Acuity: Acute Type of Exam: Initial FINDINGS: The pelvic ring is intact. There is no fracture. Degenerative changes are noted along the sacroiliac joints. The bladder is partially distended with urine. The Dalton catheter has been retracted with balloon inflated in the urethra along the penile portion. Prostate and seminal vesicles are unremarkable. No inguinal or pelvic sidewall adenopathy. Vascular calcifications are noted in the iliac arteries. No inguinal or pelvic sidewall adenopathy. There is a left inguinal hernia containing fat. No appreciable soft tissue swelling. 1. A Dalton catheter has been pulled back with the balloon portion inflated in the penile urethra. Repositioning is recommended. The catheter is not within the bladder.      XR CHEST PORTABLE    Result Date: 11/5/2021  EXAMINATION: ONE XRAY VIEW OF THE CHEST 11/5/2021 9:04 pm COMPARISON: 11/04/2021 HISTORY: ORDERING SYSTEM PROVIDED HISTORY: f/u on Trace biapical pneumothoraces pneumomediastinum and pneumopericardium TECHNOLOGIST PROVIDED HISTORY: Reason for exam:->f/u on Trace biapical pneumothoraces pneumomediastinum and pneumopericardium Reason for Exam: f/u on Trace biapical pneumothoraces pneumomediastinum and pneumopericardium Acuity: Acute Type of Exam: Initial Additional signs and symptoms: na Relevant Medical/Surgical History: hypertension FINDINGS: The cardiac silhouette and mediastinal contours are stable. There are bilateral lung infiltrates, unchanged. There is a tiny right apical pneumothorax. No left pneumothorax is identified. Pneumomediastinum is again noted. Subcutaneous emphysema is noted in the supraclavicular regions. The visualized osseous structures are unremarkable. 1. Bilateral lung infiltrates, compatible with pneumonia. 2. Trace right apical pneumothorax. 3. Pneumomediastinum. 4. No left pneumothorax. XR CHEST PORTABLE    Result Date: 11/4/2021  EXAMINATION: ONE XRAY VIEW OF THE CHEST 11/4/2021 1:57 am COMPARISON: Chest radiograph dated November 3, 2021 HISTORY: ORDERING SYSTEM PROVIDED HISTORY: Acute hypoxemic respiratory failure, Covid 19 pneumonia TECHNOLOGIST PROVIDED HISTORY: Reason for exam:->Acute hypoxemic respiratory failure, Covid 19 pneumonia Reason for Exam: Acute hypoxemic respiratory failure, Covid 19 pneumonia Acuity: Unknown Type of Exam: Subsequent/Follow-up Additional signs and symptoms: Acute hypoxemic respiratory failure, Covid 19 pneumonia Relevant Medical/Surgical History: Acute hypoxemic respiratory failure, Covid 19 pneumonia FINDINGS: Pneumomediastinum and pneumopericardium are noted without significant change. There is a small stable right apical pneumothorax. Trace left apical pneumothorax is noted. Bilateral airspace opacities are seen without significant change. Subcutaneous emphysematous changes are noted overlying the base of the neck. 1. Diffuse bilateral airspace opacities without significant change.  2. Trace biapical pneumothoraces pneumomediastinum and pneumopericardium with subcutaneous emphysematous changes involving the base the neck. No significant change. XR CHEST PORTABLE    Result Date: 11/3/2021  EXAMINATION: ONE XRAY VIEW OF THE CHEST 11/3/2021 6:34 am COMPARISON: Chest radiograph dated November 1, 2021 HISTORY: ORDERING SYSTEM PROVIDED HISTORY: Bilateral Covid pneumonia, acute hypoxemic respiratory failure TECHNOLOGIST PROVIDED HISTORY: Reason for exam:->Bilateral Covid pneumonia, acute hypoxemic respiratory failure Reason for Exam: Bilateral Covid pneumonia, acute hypoxemic respiratory failure Acuity: Unknown Type of Exam: Unknown FINDINGS: Pneumomediastinum and pneumopericardium is noted. Trace biapical pneumothoraces are present. Diffuse bilateral airspace opacities are noted. Subcutaneous emphysematous changes are seen overlying the base of the neck. 1. Extensive bilateral airspace opacities without significant change. 2. Pneumomediastinum, pneumopericardium, and trace biapical pneumothoraces without significant change. XR CHEST PORTABLE    Result Date: 11/1/2021  EXAMINATION: ONE XRAY VIEW OF THE CHEST 10/29/2021 2:04 am COMPARISON: October 28, 2021 HISTORY: ORDERING SYSTEM PROVIDED HISTORY: Hypoxia TECHNOLOGIST PROVIDED HISTORY: Reason for exam:->Hypoxia Reason for Exam: Hypoxia Acuity: Unknown Type of Exam: Subsequent/Follow-up Additional signs and symptoms: Hypoxia Relevant Medical/Surgical History: Hypoxia FINDINGS: No lines or tubes. Stable cardiomediastinal silhouette. Diffuse airspace opacities are unchanged. No significant pleural effusion. No pneumothorax. No acute osseous abnormality. 1. Stable diffuse airspace opacities. XR CHEST PORTABLE    Result Date: 11/1/2021  EXAMINATION: ONE XRAY VIEW OF THE CHEST 11/1/2021 10:58 am COMPARISON: None.  HISTORY: ORDERING SYSTEM PROVIDED HISTORY: Pneumomediastinum, COVID-19 pneumonia TECHNOLOGIST PROVIDED HISTORY: Reason for exam:->Pneumomediastinum, COVID-19 pneumonia Reason for Exam: Pneumomediastinum, COVID-19 pneumonia FINDINGS: The cardiomediastinal silhouette is stable. There is stable pneumomediastinum. There are patchy airspace opacities bilaterally, may be related to pulmonary edema versus pneumonia. There is no pleural effusion. There is no pneumothorax. The osseous structures are stable. There is soft tissue emphysema along bilateral lower neck. Patchy airspace opacities bilaterally, may be related to pulmonary edema versus pneumonia. Stable pneumomediastinum No definite pneumothorax. XR CHEST PORTABLE    Result Date: 10/31/2021  EXAMINATION: ONE XRAY VIEW OF THE CHEST 10/31/2021 9:01 am COMPARISON: 10/29/2021 HISTORY: ORDERING SYSTEM PROVIDED HISTORY: COVID-19 pneumonia with acute hypoxemic respiratory failure TECHNOLOGIST PROVIDED HISTORY: Reason for exam:->COVID-19 pneumonia with acute hypoxemic respiratory failure Reason for Exam: COVID-19 pneumonia with acute hypoxemic respiratory failure Acuity: Acute Type of Exam: Initial FINDINGS: New pneumomediastinum and subcutaneous emphysema in the right cervical region. Similar bilateral airspace opacities. Trace right apical pneumothorax. No gross bony abnormality. New pneumomediastinum, subcutaneous emphysema and trace right apical pneumothorax. XR CHEST PORTABLE    Result Date: 10/28/2021  EXAMINATION: ONE XRAY VIEW OF THE CHEST 10/28/2021 2:10 am COMPARISON: CT dated 10/27/2021 HISTORY: ORDERING SYSTEM PROVIDED HISTORY: Hypoxia TECHNOLOGIST PROVIDED HISTORY: Reason for exam:->Hypoxia Reason for Exam: Hypoxia Acuity: Acute Type of Exam: Initial FINDINGS: The heart size is upper normal.  There is extensive airspace disease bilaterally. A pneumothorax is not identified. Findings most consistent with severe bilateral COVID pneumonia.   This appears progressed since the recent prior CT exam.     CTA PULMONARY W CONTRAST    Result Date: 10/28/2021  EXAMINATION: CTA OF THE CHEST 10/27/2021 1:58 am TECHNIQUE: CTA of the chest was performed after the administration of intravenous contrast.  Multiplanar reformatted images are provided for review. MIP images are provided for review. Dose modulation, iterative reconstruction, and/or weight based adjustment of the mA/kV was utilized to reduce the radiation dose to as low as reasonably achievable. COMPARISON: None. HISTORY: ORDERING SYSTEM PROVIDED HISTORY: Shortness of breath, hypoxia, covid + TECHNOLOGIST PROVIDED HISTORY: Reason for exam:->Shortness of breath, hypoxia, covid + Decision Support Exception - unselect if not a suspected or confirmed emergency medical condition->Emergency Medical Condition (MA) Reason for Exam: Shortness of breath, hypoxia, covid + Type of Exam: Initial FINDINGS: Pulmonary Arteries: Pulmonary arteries are adequately opacified for evaluation. No evidence of intraluminal filling defect to suggest pulmonary embolism. Main pulmonary artery is normal in caliber. Mediastinum: Heart is normal in size without a pericardial effusion. The aorta, arch branches, and main pulmonary artery are normal in course and caliber. Mildly prominent subcarinal lymph node measures up to 17 mm in short axis. Left hilar lymph node measures 18 x 16 mm. An enlarged right hilar lymph node measures 30 x 14 mm. Lungs/pleura: Lungs demonstrate diffuse ground-glass opacities. No significant pleural effusions. Central airways are patent. No bronchial wall thickening or bronchiectasis. No dominant or suspicious pulmonary nodules. Upper Abdomen: Limited images of the upper abdomen are unremarkable. Soft Tissues/Bones: No acute bone or soft tissue abnormality. 1. No pulmonary embolus. 2. Pulmonary opacities compatible with viral pneumonia. 3. Reactive mediastinal and hilar lymphadenopathy. Assessment & Plan:      Gracie Henderson is a 78y.o. year old male admitted 10/26/2021 for Covid-19 pneumonia.     1) Retained Dalton Catheter: CT pelvis 11/7/21 shows the dalton catheter within the penile urethra with balloon inflated. Balloon punctured with needle and dalton removed early this morning. PVR 127cc 2hrs after dalton removal.   Continue Flomax   Avoid reinserting catheter as long as pt able to void without difficulty or decreased O2 levels. Patient seen and examined, chart reviewed.      Electronically signed by Michael Wooten PA-C on 11/7/2021 at 8:29 AM

## 2021-11-07 NOTE — PROGRESS NOTES
Gómez placement successful on initial insertion without difficulty by this RN with [employer] at bedside. 150ml clear yellow urine returned immediately. Pt had no pain or bleeding.

## 2021-11-07 NOTE — PROGRESS NOTES
[facility] [address]   Progress Note  [address] 0 1 2      Date: 2021   Patient: Columba Agustin   : 1955   DOA: 10/26/2021   MRN: [id number]   ROOM#: [id number]     Admit Date: 10/26/2021     Collaborating Urologist on Call at time of admission: Dr. Vita Chapa  CC: Shortness of breath and cough  Reason for Consult: Retained catheter    Subjective:     Pain: mild, no nausea and no vomiting,   Bowel Movement/Flatus:   Yes  Voiding:  Incompletely     Pt resting in bed, shakes his head when asked if in pain. Scant blood noted per urethra, not actively bleeding. He was straight cathed this morning with 400cc omar colored return and 1-2 small clots. Objective:    Vitals:    /71   Pulse 78   Temp 98.8 °F (37.1 °C) (Axillary)   Resp 28   Ht 5' 9.02\" (1.753 m)   Wt 209 lb 7 oz (95 kg)   SpO2 98%   BMI 30.91 kg/m²    Temp  Av.7 °F (37.6 °C)  Min: 98.8 °F (37.1 °C)  Max: 101.3 °F (38.5 °C)     Intake/Output Summary (Last 24 hours) at 2021 1101  Last data filed at 2021 2245  Gross per 24 hour   Intake 280 ml   Output 1350 ml   Net -1070 ml       Physical Exam:   General appearance: appears stated age, cooperative, fatigued, mild distress and mildly obese  Head: Normocephalic, without obvious abnormality, atraumatic, Bipap in place  Back: No CVA tenderness  Abdomen: Soft, non-tender, non-distended   : Circumcised phallus with scant blood noted per urethra.      Labs:   WBC:    Lab Results   Component Value Date    WBC 18.7 2021      Hemoglobin/Hematocrit:    Lab Results   Component Value Date    HGB 15.9 2021    HCT 50.6 2021      BMP:   Lab Results   Component Value Date     2021    K 5.1 2021     2021    CO2 27 2021    BUN 49 2021    LABALBU 3.4 2021    CREATININE 1.4 2021    CALCIUM 9.3 2021    GFRAA >60 2021    LABGLOM 51 2021      PT/INR:    Lab Results   Component Value Date    PROTIME 13.5 10/28/2021    INR 1.05 10/28/2021      PTT:    Lab Results   Component Value Date    APTT 29.8 10/28/2021     Imaging:  XR ABDOMEN (KUB) (SINGLE AP VIEW)    Result Date: 11/5/2021  EXAMINATION: ONE SUPINE XRAY VIEW(S) OF THE ABDOMEN 11/5/2021 9:04 pm COMPARISON: Lumbar spine radiographs 09/15/2015 HISTORY: ORDERING SYSTEM PROVIDED HISTORY: abdominal pain TECHNOLOGIST PROVIDED HISTORY: Reason for exam:->abdominal pain Reason for Exam: abdominal pain Acuity: Acute Type of Exam: Initial Additional signs and symptoms: na Relevant Medical/Surgical History: na FINDINGS: 2 images submitted for review. Nonspecific, nonobstructive bowel gas pattern. Punctate calcifications project over the kidneys. No acute osseous abnormality is seen. There are degenerative changes in the lumbar spine. Pelvic phleboliths are redemonstrated. 1. No evidence of bowel obstruction. 2. Suspected bilateral nephrolithiasis. CT PELVIS WO CONTRAST Additional Contrast? None    Result Date: 11/7/2021  EXAMINATION: CT OF THE PELVIS WITHOUT CONTRAST 11/7/2021 1:29 am TECHNIQUE: CT of the pelvis was performed without the administration of intravenous contrast.  Multiplanar reformatted images are provided for review. Adjustment of mA and/or kV according to patient size was utilized. Dose modulation, iterative reconstruction, and/or weight based adjustment of the mA/kV was utilized to reduce the radiation dose to as low as reasonably achievable. COMPARISON: None. HISTORY: ORDERING SYSTEM PROVIDED HISTORY: Dalton TECHNOLOGIST PROVIDED HISTORY: Reason for exam:->Dalton Additional Contrast?->None Reason for Exam: pulled on dalton Acuity: Acute Type of Exam: Initial FINDINGS: The pelvic ring is intact. There is no fracture. Degenerative changes are noted along the sacroiliac joints. The bladder is partially distended with urine.   The Dalton catheter has been retracted with balloon inflated in the urethra along the penile portion. Prostate and seminal vesicles are unremarkable. No inguinal or pelvic sidewall adenopathy. Vascular calcifications are noted in the iliac arteries. No inguinal or pelvic sidewall adenopathy. There is a left inguinal hernia containing fat. No appreciable soft tissue swelling. 1. A Gómez catheter has been pulled back with the balloon portion inflated in the penile urethra. Repositioning is recommended. The catheter is not within the bladder. XR CHEST PORTABLE    Result Date: 11/5/2021  EXAMINATION: ONE XRAY VIEW OF THE CHEST 11/5/2021 9:04 pm COMPARISON: 11/04/2021 HISTORY: ORDERING SYSTEM PROVIDED HISTORY: f/u on Trace biapical pneumothoraces pneumomediastinum and pneumopericardium TECHNOLOGIST PROVIDED HISTORY: Reason for exam:->f/u on Trace biapical pneumothoraces pneumomediastinum and pneumopericardium Reason for Exam: f/u on Trace biapical pneumothoraces pneumomediastinum and pneumopericardium Acuity: Acute Type of Exam: Initial Additional signs and symptoms: na Relevant Medical/Surgical History: hypertension FINDINGS: The cardiac silhouette and mediastinal contours are stable. There are bilateral lung infiltrates, unchanged. There is a tiny right apical pneumothorax. No left pneumothorax is identified. Pneumomediastinum is again noted. Subcutaneous emphysema is noted in the supraclavicular regions. The visualized osseous structures are unremarkable. 1. Bilateral lung infiltrates, compatible with pneumonia. 2. Trace right apical pneumothorax. 3. Pneumomediastinum. 4. No left pneumothorax.      XR CHEST PORTABLE    Result Date: 11/4/2021  EXAMINATION: ONE XRAY VIEW OF THE CHEST 11/4/2021 1:57 am COMPARISON: Chest radiograph dated November 3, 2021 HISTORY: ORDERING SYSTEM PROVIDED HISTORY: Acute hypoxemic respiratory failure, Covid 19 pneumonia TECHNOLOGIST PROVIDED HISTORY: Reason for exam:->Acute hypoxemic respiratory failure, Covid 19 pneumonia Reason for Exam: Acute hypoxemic respiratory failure, Covid 19 pneumonia Acuity: Unknown Type of Exam: Subsequent/Follow-up Additional signs and symptoms: Acute hypoxemic respiratory failure, Covid 19 pneumonia Relevant Medical/Surgical History: Acute hypoxemic respiratory failure, Covid 19 pneumonia FINDINGS: Pneumomediastinum and pneumopericardium are noted without significant change. There is a small stable right apical pneumothorax. Trace left apical pneumothorax is noted. Bilateral airspace opacities are seen without significant change. Subcutaneous emphysematous changes are noted overlying the base of the neck. 1. Diffuse bilateral airspace opacities without significant change. 2. Trace biapical pneumothoraces pneumomediastinum and pneumopericardium with subcutaneous emphysematous changes involving the base the neck. No significant change. XR CHEST PORTABLE    Result Date: 11/3/2021  EXAMINATION: ONE XRAY VIEW OF THE CHEST 11/3/2021 6:34 am COMPARISON: Chest radiograph dated November 1, 2021 HISTORY: ORDERING SYSTEM PROVIDED HISTORY: Bilateral Covid pneumonia, acute hypoxemic respiratory failure TECHNOLOGIST PROVIDED HISTORY: Reason for exam:->Bilateral Covid pneumonia, acute hypoxemic respiratory failure Reason for Exam: Bilateral Covid pneumonia, acute hypoxemic respiratory failure Acuity: Unknown Type of Exam: Unknown FINDINGS: Pneumomediastinum and pneumopericardium is noted. Trace biapical pneumothoraces are present. Diffuse bilateral airspace opacities are noted. Subcutaneous emphysematous changes are seen overlying the base of the neck. 1. Extensive bilateral airspace opacities without significant change. 2. Pneumomediastinum, pneumopericardium, and trace biapical pneumothoraces without significant change.      XR CHEST PORTABLE    Result Date: 11/1/2021  EXAMINATION: ONE XRAY VIEW OF THE CHEST 10/29/2021 2:04 am COMPARISON: October 28, 2021 HISTORY: ORDERING SYSTEM PROVIDED HISTORY: Hypoxia TECHNOLOGIST PROVIDED HISTORY: Reason for exam:->Hypoxia Reason for Exam: Hypoxia Acuity: Unknown Type of Exam: Subsequent/Follow-up Additional signs and symptoms: Hypoxia Relevant Medical/Surgical History: Hypoxia FINDINGS: No lines or tubes. Stable cardiomediastinal silhouette. Diffuse airspace opacities are unchanged. No significant pleural effusion. No pneumothorax. No acute osseous abnormality. 1. Stable diffuse airspace opacities. XR CHEST PORTABLE    Result Date: 11/1/2021  EXAMINATION: ONE XRAY VIEW OF THE CHEST 11/1/2021 10:58 am COMPARISON: None. HISTORY: ORDERING SYSTEM PROVIDED HISTORY: Pneumomediastinum, COVID-19 pneumonia TECHNOLOGIST PROVIDED HISTORY: Reason for exam:->Pneumomediastinum, COVID-19 pneumonia Reason for Exam: Pneumomediastinum, COVID-19 pneumonia FINDINGS: The cardiomediastinal silhouette is stable. There is stable pneumomediastinum. There are patchy airspace opacities bilaterally, may be related to pulmonary edema versus pneumonia. There is no pleural effusion. There is no pneumothorax. The osseous structures are stable. There is soft tissue emphysema along bilateral lower neck. Patchy airspace opacities bilaterally, may be related to pulmonary edema versus pneumonia. Stable pneumomediastinum No definite pneumothorax. XR CHEST PORTABLE    Result Date: 10/31/2021  EXAMINATION: ONE XRAY VIEW OF THE CHEST 10/31/2021 9:01 am COMPARISON: 10/29/2021 HISTORY: ORDERING SYSTEM PROVIDED HISTORY: COVID-19 pneumonia with acute hypoxemic respiratory failure TECHNOLOGIST PROVIDED HISTORY: Reason for exam:->COVID-19 pneumonia with acute hypoxemic respiratory failure Reason for Exam: COVID-19 pneumonia with acute hypoxemic respiratory failure Acuity: Acute Type of Exam: Initial FINDINGS: New pneumomediastinum and subcutaneous emphysema in the right cervical region. Similar bilateral airspace opacities. Trace right apical pneumothorax. No gross bony abnormality.      New pneumomediastinum, subcutaneous emphysema and trace right apical pneumothorax. XR CHEST PORTABLE    Result Date: 10/28/2021  EXAMINATION: ONE XRAY VIEW OF THE CHEST 10/28/2021 2:10 am COMPARISON: CT dated 10/27/2021 HISTORY: ORDERING SYSTEM PROVIDED HISTORY: Hypoxia TECHNOLOGIST PROVIDED HISTORY: Reason for exam:->Hypoxia Reason for Exam: Hypoxia Acuity: Acute Type of Exam: Initial FINDINGS: The heart size is upper normal.  There is extensive airspace disease bilaterally. A pneumothorax is not identified. Findings most consistent with severe bilateral COVID pneumonia. This appears progressed since the recent prior CT exam.     CTA PULMONARY W CONTRAST    Result Date: 10/28/2021  EXAMINATION: CTA OF THE CHEST 10/27/2021 1:58 am TECHNIQUE: CTA of the chest was performed after the administration of intravenous contrast.  Multiplanar reformatted images are provided for review. MIP images are provided for review. Dose modulation, iterative reconstruction, and/or weight based adjustment of the mA/kV was utilized to reduce the radiation dose to as low as reasonably achievable. COMPARISON: None. HISTORY: ORDERING SYSTEM PROVIDED HISTORY: Shortness of breath, hypoxia, covid + TECHNOLOGIST PROVIDED HISTORY: Reason for exam:->Shortness of breath, hypoxia, covid + Decision Support Exception - unselect if not a suspected or confirmed emergency medical condition->Emergency Medical Condition (MA) Reason for Exam: Shortness of breath, hypoxia, covid + Type of Exam: Initial FINDINGS: Pulmonary Arteries: Pulmonary arteries are adequately opacified for evaluation. No evidence of intraluminal filling defect to suggest pulmonary embolism. Main pulmonary artery is normal in caliber. Mediastinum: Heart is normal in size without a pericardial effusion. The aorta, arch branches, and main pulmonary artery are normal in course and caliber. Mildly prominent subcarinal lymph node measures up to 17 mm in short axis.  Left hilar lymph node measures 18 x 16 mm. An enlarged right hilar lymph node measures 30 x 14 mm. Lungs/pleura: Lungs demonstrate diffuse ground-glass opacities. No significant pleural effusions. Central airways are patent. No bronchial wall thickening or bronchiectasis. No dominant or suspicious pulmonary nodules. Upper Abdomen: Limited images of the upper abdomen are unremarkable. Soft Tissues/Bones: No acute bone or soft tissue abnormality. 1. No pulmonary embolus. 2. Pulmonary opacities compatible with viral pneumonia. 3. Reactive mediastinal and hilar lymphadenopathy. Assessment & Plan:      Gracie Henderson is a 78y.o. year old male admitted 10/26/2021 for Covid-19 pneumonia.    1) Retained Dalton Catheter: CT pelvis 11/7/21 shows the dalton catheter within the penile urethra with balloon inflated. Balloon punctured with needle and dalton removed early this morning. Pt straight cathed with 400cc omar colored urine return this AM.              Continue Flomax              Avoid reinserting catheter due to mild confusion and risk of pt pulling catheter. Recommend PVR bladder scans q6hrs and straight catheterization if >250cc. Will follow peripherally. Patient seen and examined, chart reviewed.      Electronically signed by Zoya Rios PA-C on 11/7/2021 at 11:01 AM

## 2021-11-07 NOTE — PROGRESS NOTES
Pt oxygen dropped to 27% while 100% on bipap while trying to use a urinal. Hospitalist notified and dalton requested.

## 2021-11-08 NOTE — PROGRESS NOTES
All sedation paused, pt opened eyes, followed commands in all 4 extremities at this time. Pt reassured and sedation restarted. Updated Dr. Mariajose Flores regarding following commands and purposeful movement so no need for hypothermia protocol.

## 2021-11-08 NOTE — PROGRESS NOTES
Tele called HR 36 O2 in the 70's. Went to room now tele shows flat-line no pulse. Called code and started cpr.

## 2021-11-08 NOTE — PROGRESS NOTES
Family in visiting with patient. Dr Bhavin Agarwal in up dated on patient status. Notified of code and intubation. Dr Bhavin Agarwal spoke with family  about plan of care. This RN up dated family to vent protocol and how long we keep patients on the vent. Emotional support provided.

## 2021-11-08 NOTE — CARE COORDINATION
Patient remains on BIPAP 100%. Attempted to reach child Irma Meehan at [phone]; LVM. Surinder Daly RN     [id number] Code called. Will remain available. Surinder Daly RN     [id number] Patient now intubated; will follow.  Surinder Daly RN

## 2021-11-08 NOTE — PROGRESS NOTES
Pt intubated @ 1122 by Bobbi Warner. One attempt to intubate made. Pt preoxygenated by bag mask prior to intubation. Pt intubated with size 8.0 tube, landmark 25 cm @ lips. Teeth checked and not damaged by this RT. Tube placement confirmed by bilateral breath sounds, tube fogging, colorimetric etco2. Patient placed on 840 ventilator. No complications.

## 2021-11-08 NOTE — PROGRESS NOTES
Pulmonary and Critical Care  Progress Note      VITALS:  /80   Pulse 116   Temp 98.7 °F (37.1 °C) (Axillary)   Resp 25   Ht 5' 9.02\" (1.753 m)   Wt 216 lb 4.3 oz (98.1 kg)   SpO2 91%   BMI 31.92 kg/m²     Subjective:   CHIEF COMPLAINT :SOB     HPI:                The patient is on the vent and sedated. He had a cardiac arrest, shocked with ROSC. He is not responding to painful stimuli. Objective:   PHYSICAL EXAM:    LUNGS:Occasional basal crackles  Abd-soft, BS+,NT  Ext- no pedal edema  CVS-s1s2, no murmurs      DATA:    CBC:  Recent Labs     11/06/21  0807 11/07/21  0718 11/08/21  0726   WBC 22.2* 18.7* 16.9*   RBC 5.10 5.05 5.17   HGB 16.0 15.9 16.2   HCT 50.2 50.6 50.9    334 330   MCV 98.4 100.2* 98.5   MCH 31.4* 31.5* 31.3*   MCHC 31.9* 31.4* 31.8*   RDW 13.2 13.2 13.0   SEGSPCT 88.5* 88.7* 90.5*      BMP:  Recent Labs     11/06/21  0807 11/07/21  0718 11/08/21  0726    139 139   K 4.7 5.1 4.8    102 99   CO2 28 27 28   BUN 37* 49* 50*   CREATININE 1.0 1.4* 1.1   CALCIUM 9.4 9.3 9.6   GLUCOSE 93 96 115*      ABG:  Recent Labs     11/07/21  0200   PH 7.38   PO2ART 69*   IUP2ANO 49.0*   O2SAT 91.3*     BNP  No results found for: BNP   D-Dimer:  Lab Results   Component Value Date    DDIMER 837 (H) 10/29/2021      1. Radiology: Pending      Assessment/Plan     Patient Active Problem List    Diagnosis Date Noted    Pneumonia due to COVID-19 virus 10/27/2021   s/p In hospital Cardiac arrest  VDRF  Acute Hypoxic resp failure- worsened  Bilateral Pneumonia sec to COVID-19  Obesity       1. Abx per ID  2. Pan cx  3. Barcitnib  4. Inhalers  5. Keep sats > 92%  6. CXR post intubation  7. ABG post intubation  8. DVT and GI Prophylaxis  9. CXR in am  10. CPT  11. NG/OG tube and tube feeds  12.  C/w present management    Electronically signed by Lori Helm MD on 11/8/2021 at 11:44 AM

## 2021-11-08 NOTE — PROGRESS NOTES
Perfect serve to [place], [place] regarding placement of dalton due to no UOP but bladder scan showing >200cc. Will place 14f or coude dalton per order.

## 2021-11-08 NOTE — PROGRESS NOTES
Chasidy Gimenez SIGNIFICANT EVENT: CODE BLUE  SUBJECTIVE:     CODE BLUE was called for Yue Gaitan for bradycardia, then pulseless, unresponsive. Patient seen and examined in 2012/2012-A. At about 1112 am patient was noted to be bradycardic then pulseless, unresponsive. Code blue called. PEA initially. Patient was coded per ACLS protocol. CPR, epinephrine. Went into V fib, shocked, amiodarone given. ROSC achieved. Intubated and placed on vent support. Patient was hypotensive. RIJ cvc placed. Started on pressors     OBJECTIVE:      VITALS  Vitals:    11/08/21 1143 11/08/21 1216 11/08/21 1252 11/08/21 1301   BP:  (!) 49/26 (!) 68/41 (!) 76/59   Pulse: 116 83 64 65   Resp: 25 20 25 21   Temp:       TempSrc:       SpO2: 91% 95% 96% 97%   Weight:       Height:              PHYSICAL EXAM   GEN Unresponsive  HENT Atraumatic  RESP Diminished  CARDIO/VASC S1, S2 present  ABD soft  NEURO Unresponsive      ASSESSMENT/PLAN:    -Acute hypoxic respiratory failure: Due to COVID-19 pneumonia.   -Placed on vent support.   -Continue dexamethasone  -Life support measures    -Hypertension    -Hematuria, retained dalton catheter, s/p catheter balloon puncture and removal by urology        Patient is critical with high probability of clinical deterioration. 40 minutes of critical care time spent. This time includes time spent at bedside interviewing and examining patient, coordinating care, review of records, discussing with patient and  family at bedside. This time is excluding time spent performing procedures.     Electronically signed by Fidelina Bhatti MD on 11/8/2021 at 2:28 PM

## 2021-11-08 NOTE — PROGRESS NOTES
1112: Staff alerted by tele that patients heart rate had become critically low. Patient found with no pulse. Code team alerted and CPR started. 1113: 1 amp Epi given. 1116: Bicarb given; Pulse check- no pulse; Vfib on monitor. 1117: Shock given CPR resumed. 1118: 1 amp Epi given. No pulse. CPR resumed  1119 300 Amiodarone given  1120: Pulse noted. Patient attempting movements. 1122: Patient intubation complete.  INT: 8.0 @ 24   1123: VS: /69, Pulse: 134; Resp 30; O2- 91%

## 2021-11-08 NOTE — PROGRESS NOTES
[address] [facility]  consulted by Dr. Mignon Cabral for monitoring and adjustment. Indication for treatment: Sepsis  Goal trough: 15 mcg/mL  AUC/SHANNON: 400-600    Pertinent Laboratory Values:   Temp Readings from Last 3 Encounters:   11/08/21 97.5 °F (36.4 °C) (Rectal)   12/15/15 98.1 °F (36.7 °C) (Temporal)     Recent Labs     11/06/21  0807 11/07/21  0718 11/08/21  0726   WBC 22.2* 18.7* 16.9*     Recent Labs     11/06/21  0807 11/07/21  0718 11/08/21  0726   BUN 37* 49* 50*   CREATININE 1.0 1.4* 1.1     Estimated Creatinine Clearance: 75 mL/min (based on SCr of 1.1 mg/dL). Intake/Output Summary (Last 24 hours) at 11/8/2021 1435  Last data filed at 11/7/2021 2231  Gross per 24 hour   Intake 640 ml   Output --   Net 640 ml       Pertinent Cultures:  Date    Source    Results  11/8   Blood    Ordered    Vancomycin level:   TROUGH:  No results for input(s): VANCOTROUGH in the last 72 hours. RANDOM:  No results for input(s): VANCORANDOM in the last 72 hours. Assessment:  · WBC and temperature: WBC @ 16.9; afebrile  · SCr, BUN, and urine output:   · Scr stable, BUN trending up  · Day(s) of therapy: 1  · Vancomycin concentration: to be collected    Plan:  · Vancomycin 1500 mg IVPB q18h   · Predicted trough: 15.8,   · Plan to collect a level in 48h  · Pharmacy will continue to monitor patient and adjust therapy as indicated    Ramesh 3 11/10 @ 0200    Thank you for the consult.   ALEC Mcclellan [facility] [place], PharmD  11/8/2021 2:35 PM

## 2021-11-08 NOTE — PROGRESS NOTES
Deyvi CRNA attempted to place an artline to right radial artery without success. Unable to thread line in x 2 attempts. Dyevi will send someone to place artline.

## 2021-11-09 NOTE — PROGRESS NOTES
[address] [facility]  consulted by Dr. Darrian Barnes for monitoring and adjustment. Indication for treatment: Sepsis  Goal trough: 15 mcg/mL  AUC/SHANNON: 400-600    Pertinent Laboratory Values:   Temp Readings from Last 3 Encounters:   11/09/21 98.1 °F (36.7 °C) (Rectal)   12/15/15 98.1 °F (36.7 °C) (Temporal)     Recent Labs     11/07/21  0718 11/08/21  0726 11/08/21  1815   WBC 18.7* 16.9* 23.7*     Recent Labs     11/07/21  0718 11/08/21  0726 11/08/21  1815   BUN 49* 50* 49*   CREATININE 1.4* 1.1 1.1     Estimated Creatinine Clearance: 75 mL/min (based on SCr of 1.1 mg/dL). Intake/Output Summary (Last 24 hours) at 11/9/2021 1557  Last data filed at 11/9/2021 0859  Gross per 24 hour   Intake 2175.86 ml   Output 2000 ml   Net 175.86 ml       Pertinent Cultures:  Date    Source    Results  11/8   Blood    Ordered    Vancomycin level:   TROUGH:  No results for input(s): VANCOTROUGH in the last 72 hours. RANDOM:  No results for input(s): VANCORANDOM in the last 72 hours. Assessment:  · WBC and temperature: WBC trending upward @ 23.7; patient remains afebrile  · SCr, BUN, and urine output:   · Scr stable, BUN improved; SCr @ 1.1, BUN @ 49  · Day(s) of therapy: 2  · Vancomycin concentration: to be collected    Plan:  · Vancomycin 1500 mg IVPB q18h   · Predicted trough: 15.8,   · Level due tomorrow AM  · Pharmacy will continue to monitor patient and adjust therapy as indicated    Ramesh 3 11/10 @ 0200    Thank you for the consult.   Thania David, [facility]  11/9/2021 3:57 PM

## 2021-11-09 NOTE — PROGRESS NOTES
Lumbar spine radiographs 09/15/2015 HISTORY: ORDERING SYSTEM PROVIDED HISTORY: abdominal pain TECHNOLOGIST PROVIDED HISTORY: Reason for exam:->abdominal pain Reason for Exam: abdominal pain Acuity: Acute Type of Exam: Initial Additional signs and symptoms: na Relevant Medical/Surgical History: na FINDINGS: 2 images submitted for review. Nonspecific, nonobstructive bowel gas pattern. Punctate calcifications project over the kidneys. No acute osseous abnormality is seen. There are degenerative changes in the lumbar spine. Pelvic phleboliths are redemonstrated. 1. No evidence of bowel obstruction. 2. Suspected bilateral nephrolithiasis. CT PELVIS WO CONTRAST Additional Contrast? None    Result Date: 11/7/2021  EXAMINATION: CT OF THE PELVIS WITHOUT CONTRAST 11/7/2021 1:29 am TECHNIQUE: CT of the pelvis was performed without the administration of intravenous contrast.  Multiplanar reformatted images are provided for review. Adjustment of mA and/or kV according to patient size was utilized. Dose modulation, iterative reconstruction, and/or weight based adjustment of the mA/kV was utilized to reduce the radiation dose to as low as reasonably achievable. COMPARISON: None. HISTORY: ORDERING SYSTEM PROVIDED HISTORY: Dalton TECHNOLOGIST PROVIDED HISTORY: Reason for exam:->Dalton Additional Contrast?->None Reason for Exam: pulled on dalton Acuity: Acute Type of Exam: Initial FINDINGS: The pelvic ring is intact. There is no fracture. Degenerative changes are noted along the sacroiliac joints. The bladder is partially distended with urine. The Dalton catheter has been retracted with balloon inflated in the urethra along the penile portion. Prostate and seminal vesicles are unremarkable. No inguinal or pelvic sidewall adenopathy. Vascular calcifications are noted in the iliac arteries. No inguinal or pelvic sidewall adenopathy. There is a left inguinal hernia containing fat. No appreciable soft tissue swelling. 1. A Gómez catheter has been pulled back with the balloon portion inflated in the penile urethra. Repositioning is recommended. The catheter is not within the bladder. XR CHEST PORTABLE    No change in life support. Moderate to severe bilateral airspace disease, similar to the prior exam.     XR CHEST PORTABLE    Result Date: 11/8/2021  EXAMINATION: ONE X-RAY VIEW OF THE CHEST 11/8/2021 1:16 pm COMPARISON: 11/05/2021 HISTORY: ORDERING SYSTEM PROVIDED HISTORY: ETT placement TECHNOLOGIST PROVIDED HISTORY: Reason for exam:->ett placement Reason for Exam: ETT and ng placement Type of Exam: Subsequent/Follow-up Initial evaluation FINDINGS: Monitor wires overlie the chest.  The patient has been intubated. The tip of the endotracheal tube is at the T4 level. There is a new right internal jugular central venous line with the tip in the superior vena cava. There is no pneumothorax. An NG tube courses below the level of the diaphragm and the tip is not included on this film. Patchy bilateral pulmonary opacities are unchanged. Endotracheal tube with the tip at T4. New right internal jugular central venous line with the tip in the superior vena cava. No pneumothorax. Bilateral pulmonary opacities are unchanged. XR CHEST PORTABLE    Result Date: 11/5/2021  EXAMINATION: ONE XRAY VIEW OF THE CHEST 11/5/2021 9:04 pm COMPARISON: 11/04/2021 HISTORY: ORDERING SYSTEM PROVIDED HISTORY: f/u on Trace biapical pneumothoraces pneumomediastinum and pneumopericardium TECHNOLOGIST PROVIDED HISTORY: Reason for exam:->f/u on Trace biapical pneumothoraces pneumomediastinum and pneumopericardium Reason for Exam: f/u on Trace biapical pneumothoraces pneumomediastinum and pneumopericardium Acuity: Acute Type of Exam: Initial Additional signs and symptoms: na Relevant Medical/Surgical History: hypertension FINDINGS: The cardiac silhouette and mediastinal contours are stable. There are bilateral lung infiltrates, unchanged. There is a tiny right apical pneumothorax. No left pneumothorax is identified. Pneumomediastinum is again noted. Subcutaneous emphysema is noted in the supraclavicular regions. The visualized osseous structures are unremarkable. 1. Bilateral lung infiltrates, compatible with pneumonia. 2. Trace right apical pneumothorax. 3. Pneumomediastinum. 4. No left pneumothorax. XR CHEST PORTABLE    Result Date: 11/4/2021  EXAMINATION: ONE XRAY VIEW OF THE CHEST 11/4/2021 1:57 am COMPARISON: Chest radiograph dated November 3, 2021 HISTORY: ORDERING SYSTEM PROVIDED HISTORY: Acute hypoxemic respiratory failure, Covid 19 pneumonia TECHNOLOGIST PROVIDED HISTORY: Reason for exam:->Acute hypoxemic respiratory failure, Covid 19 pneumonia Reason for Exam: Acute hypoxemic respiratory failure, Covid 19 pneumonia Acuity: Unknown Type of Exam: Subsequent/Follow-up Additional signs and symptoms: Acute hypoxemic respiratory failure, Covid 19 pneumonia Relevant Medical/Surgical History: Acute hypoxemic respiratory failure, Covid 19 pneumonia FINDINGS: Pneumomediastinum and pneumopericardium are noted without significant change. There is a small stable right apical pneumothorax. Trace left apical pneumothorax is noted. Bilateral airspace opacities are seen without significant change. Subcutaneous emphysematous changes are noted overlying the base of the neck. 1. Diffuse bilateral airspace opacities without significant change. 2. Trace biapical pneumothoraces pneumomediastinum and pneumopericardium with subcutaneous emphysematous changes involving the base the neck. No significant change.      XR CHEST PORTABLE    Result Date: 11/3/2021  EXAMINATION: ONE XRAY VIEW OF THE CHEST 11/3/2021 6:34 am COMPARISON: Chest radiograph dated November 1, 2021 HISTORY: ORDERING SYSTEM PROVIDED HISTORY: Bilateral Covid pneumonia, acute hypoxemic respiratory failure TECHNOLOGIST PROVIDED HISTORY: Reason for exam:->Bilateral Covid pneumonia, acute hypoxemic respiratory failure Reason for Exam: Bilateral Covid pneumonia, acute hypoxemic respiratory failure Acuity: Unknown Type of Exam: Unknown FINDINGS: Pneumomediastinum and pneumopericardium is noted. Trace biapical pneumothoraces are present. Diffuse bilateral airspace opacities are noted. Subcutaneous emphysematous changes are seen overlying the base of the neck. 1. Extensive bilateral airspace opacities without significant change. 2. Pneumomediastinum, pneumopericardium, and trace biapical pneumothoraces without significant change. XR CHEST PORTABLE    Result Date: 11/1/2021  EXAMINATION: ONE XRAY VIEW OF THE CHEST 10/29/2021 2:04 am COMPARISON: October 28, 2021 HISTORY: ORDERING SYSTEM PROVIDED HISTORY: Hypoxia TECHNOLOGIST PROVIDED HISTORY: Reason for exam:->Hypoxia Reason for Exam: Hypoxia Acuity: Unknown Type of Exam: Subsequent/Follow-up Additional signs and symptoms: Hypoxia Relevant Medical/Surgical History: Hypoxia FINDINGS: No lines or tubes. Stable cardiomediastinal silhouette. Diffuse airspace opacities are unchanged. No significant pleural effusion. No pneumothorax. No acute osseous abnormality. 1. Stable diffuse airspace opacities. XR CHEST PORTABLE    Result Date: 11/1/2021  EXAMINATION: ONE XRAY VIEW OF THE CHEST 11/1/2021 10:58 am COMPARISON: None. HISTORY: ORDERING SYSTEM PROVIDED HISTORY: Pneumomediastinum, COVID-19 pneumonia TECHNOLOGIST PROVIDED HISTORY: Reason for exam:->Pneumomediastinum, COVID-19 pneumonia Reason for Exam: Pneumomediastinum, COVID-19 pneumonia FINDINGS: The cardiomediastinal silhouette is stable. There is stable pneumomediastinum. There are patchy airspace opacities bilaterally, may be related to pulmonary edema versus pneumonia. There is no pleural effusion. There is no pneumothorax. The osseous structures are stable. There is soft tissue emphysema along bilateral lower neck.      Patchy airspace opacities bilaterally, may be related TECHNOLOGIST PROVIDED HISTORY: Reason for exam:->Shortness of breath, hypoxia, covid + Decision Support Exception - unselect if not a suspected or confirmed emergency medical condition->Emergency Medical Condition (MA) Reason for Exam: Shortness of breath, hypoxia, covid + Type of Exam: Initial FINDINGS: Pulmonary Arteries: Pulmonary arteries are adequately opacified for evaluation. No evidence of intraluminal filling defect to suggest pulmonary embolism. Main pulmonary artery is normal in caliber. Mediastinum: Heart is normal in size without a pericardial effusion. The aorta, arch branches, and main pulmonary artery are normal in course and caliber. Mildly prominent subcarinal lymph node measures up to 17 mm in short axis. Left hilar lymph node measures 18 x 16 mm. An enlarged right hilar lymph node measures 30 x 14 mm. Lungs/pleura: Lungs demonstrate diffuse ground-glass opacities. No significant pleural effusions. Central airways are patent. No bronchial wall thickening or bronchiectasis. No dominant or suspicious pulmonary nodules. Upper Abdomen: Limited images of the upper abdomen are unremarkable. Soft Tissues/Bones: No acute bone or soft tissue abnormality. 1. No pulmonary embolus. 2. Pulmonary opacities compatible with viral pneumonia. 3. Reactive mediastinal and hilar lymphadenopathy. XR ABDOMEN FOR NG/OG/NE TUBE PLACEMENT    Result Date: 11/8/2021  EXAMINATION: ONE SUPINE XRAY VIEW(S) OF THE ABDOMEN 11/8/2021 7:07 pm COMPARISON: 11/08/2021 chest x-ray HISTORY: ORDERING SYSTEM PROVIDED HISTORY: Confirmation of course of NG/OG/NE tube and location of tip of tube TECHNOLOGIST PROVIDED HISTORY: Reason for exam:->Confirmation of course of NG/OG/NE tube and location of tip of tube Portable? ->Yes Reason for Exam: Confirmation of course of NG/OG/NE tube and location of tip of tube Acuity: Acute Type of Exam: Initial Additional signs and symptoms: NA Relevant Medical/Surgical History: HYPERTENSION FINDINGS: Nasogastric tube tip is in the stomach with the side hole well past the GE junction. Endotracheal tube and right jugular central venous catheter again noted. Similar bilateral lung infiltrates. Nasogastric tube tip is in the stomach       Assessment & Plan:      Gracie Henderson is a 78y.o. year old male admitted 10/26/2021 for Covid-19 pneumonia.    1) Retained Dalton Catheter: CT pelvis 11/7/21 shows the dalton catheter within the penile urethra with balloon inflated.               Balloon punctured with needle and dalton removed 11/7/21. Dalton placed 11/8/21 after pt coded and was intubated.              Continue Flomax   Recommend removing dalton when no longer clinically necessary.     Pt stable from a  standpoint. Will sign off, please call with any questions. Pt to follow up in our office in 1 month for reevaluation. Patient seen and examined, chart reviewed.      Electronically signed by Zoya Rios PA-C on 11/9/2021 at 8:26 AM

## 2021-11-09 NOTE — PROGRESS NOTES
11/08/21 2330   Vent Information   Vent Type 840   Vent Mode AC/PC   Vt Ordered 500 mL   Pressure Ordered 28   Rate Set 20 bmp   FiO2  100 %   SpO2 95 %   SpO2/FiO2 ratio 95   Sensitivity 3   PEEP/CPAP 10   I Time/ I Time % 1 s   Humidification Source HME     Decreased FiO2 to 95%

## 2021-11-09 NOTE — PROGRESS NOTES
Pulmonary and Critical Care  Progress Note      VITALS:  /62   Pulse 57   Temp 98 °F (36.7 °C) (Rectal)   Resp 21   Ht 5' 9.02\" (1.753 m)   Wt 216 lb 4.3 oz (98.1 kg)   SpO2 96%   BMI 31.92 kg/m²     Subjective:   CHIEF COMPLAINT :SOB     HPI:                The patient is on the vent and sedated. She is still on high fio2. He has minimal response to painful stimuli. Objective:   PHYSICAL EXAM:    LUNGS:Occasional basal crackles  Abd-soft, BS+,NT  Ext- no pedal edema  CVS-s1s2, no murmurs      DATA:    CBC:  Recent Labs     11/07/21  0718 11/08/21  0726 11/08/21  1815   WBC 18.7* 16.9* 23.7*   RBC 5.05 5.17 4.54*   HGB 15.9 16.2 14.4   HCT 50.6 50.9 43.3    330 388   .2* 98.5 95.4   MCH 31.5* 31.3* 31.7*   MCHC 31.4* 31.8* 33.3   RDW 13.2 13.0 13.0   SEGSPCT 88.7* 90.5* 90.7*      BMP:  Recent Labs     11/07/21  0718 11/08/21  0726 11/08/21  1815    139 141   K 5.1 4.8 5.4*    99 104   CO2 27 28 25   BUN 49* 50* 49*   CREATININE 1.4* 1.1 1.1   CALCIUM 9.3 9.6 8.5   GLUCOSE 96 115* 202*      ABG:  Recent Labs     11/08/21  1500 11/08/21  1700 11/09/21  0600   PH 7.34 7.34 7.32*   PO2ART 91 87 65*   [id number] 50.0* 50.0* 47.0*   O2SAT 94.0* 95.0* 92.1*     BNP  No results found for: BNP   D-Dimer:  Lab Results   Component Value Date    DDIMER 837 (H) 10/29/2021      Radiology:  No change in life support.       Moderate to severe bilateral airspace disease, similar to the prior exam     1. Assessment/Plan     Patient Active Problem List    Diagnosis Date Noted    Pneumonia due to COVID-19 virus 10/27/2021   s/p In hospital Cardiac arrest  VDRF  Acute Hypoxic resp failure- worsened  Bilateral Pneumonia sec to COVID-19  Obesity       1. Abx  2. F/u C&S  3. Barcitinib  4. CXR in am  5. Keep sats > 92%  6. Inhalers  7. Pressors for a MAP > 65 mmhg  8. DVT and GI prophylaxis  9.  SAT and SBT trial as tolerated     Electronically signed by Levar Liu MD on 11/9/2021 at 1:37 PM

## 2021-11-09 NOTE — PROGRESS NOTES
.        Hospitalist Progress Note      Name:  Columba Agustin /Age/Sex: 1955  (89 y.o. male)   MRN & CSN:  [id number] & [id number] Admission Date/Time: 10/26/2021 11:34 PM   Location:  -A PCP: Glendell Litten, MD         Hospital Day: 15    Assessment and Plan:   Colmuba Agustin is a 78 y.o.  male  who presents with <principal problem not specified>    1. Acute hypoxic respiratory failure: Due to COVID-19 pneumonia. On vent support. Pulmonary following.     2.  COVID-19 pneumonia: Continue dexamethasone and baricitinib.      3.  Chest pain: Related to COVID-19 pneumonia. CTA on 10/27-no pulmonary embolism. Resolved.     4. Hypotension: On pressors. Wean as tolerated. Empirically on iv antibiotics. Possible sepsis. Leukocytosis 23. 7.     5. Hyperlipidemia: Continue statin.     6. Hematuria. Urology following     DVT prophylaxis: Lovenox  Disposition:    Diet No diet orders on file   DVT Prophylaxis [] Lovenox, []  Heparin, [] SCDs, [] Ambulation   GI Prophylaxis [] PPI,  [] H2 Blocker,  [] Carafate,  [] Diet/Tube Feeds   Code Status Full Code   Disposition Patient requires continued admission due to    MDM [] Low, [] Moderate,[]  High  Patient's risk as above due to      History of Present Illness:     Chief Complaint: <principal problem not specified>  Columba Agustin is a 78 y.o.  male  who presents with covid pneumonia. Patient intubated yesterday placed on vent support after he coded, was in PEA, ventricular fibrillation for which he was defibrillated. Ten point ROS reviewed negative, unless as noted above    Objective: Intake/Output Summary (Last 24 hours) at 2021 1759  Last data filed at 2021 1615  Gross per 24 hour   Intake 2175.86 ml   Output 3600 ml   Net -1424.14 ml      Vitals:   Vitals:    21 1700   BP: 101/61   Pulse: 55   Resp: 16   Temp: 98 °F (36.7 °C)   SpO2: 96%     Physical Exam:   GEN Sedated male, no apparent distress.  Appears given Q6H PRN            Electronically signed by Cecilio Aschoff, MD on 11/9/2021 at 5:59 PM

## 2021-11-09 NOTE — PROGRESS NOTES
Comprehensive Nutrition Assessment    Type and Reason for Visit:  Reassess    Nutrition Recommendations/Plan:   · Please consult RD for EN order and manage    Nutrition Assessment:  Pt does not have any intake recorded for over six days. Unsure of pt nutritional status due to this lack of documented intake or refusal. Please record all intake. Pt is now vented - please consult RD for EN order and mange    Malnutrition Assessment:  Malnutrition Status:  Severe malnutrition    Context:  Acute Illness     Findings of the 6 clinical characteristics of malnutrition:  Energy Intake:  7 - 50% or less of estimated energy requirements for 5 or more days  Weight Loss:  7 - Greater than 2% over 1 week     Body Fat Loss:  Unable to assess     Muscle Mass Loss:  Unable to assess    Fluid Accumulation:  No significant fluid accumulation Generalized   Strength:  Not Performed    Estimated Daily Nutrient Needs:  Energy (kcal):  1830-8844 (Guicho Spinner w/ stress factor 1.0-1.2); Weight Used for Energy Requirements:  Current     Protein (g):   (1.2-1.4 g/kg); Weight Used for Protein Requirements:  Ideal        Fluid (ml/day):  1800; Method Used for Fluid Requirements:  1 ml/kcal      Wounds:  None       Current Nutrition Therapies:    No diet orders on file    Anthropometric Measures:  · Height: 5' 9.02\" (175.3 cm)  · Current Body Weight: 216 lb (98 kg)   · Admission Body Weight: 242 lb (109.8 kg)    · Usual Body Weight: 254 lb (115.2 kg)     · Ideal Body Weight: 160 lbs; % Ideal Body Weight 144.7 %   · BMI: 31.9  · BMI Categories: Obese Class 1 (BMI 30.0-34. 9)       Nutrition Diagnosis:   · Inadequate oral intake related to acute injury/trauma as evidenced by weight loss      Nutrition Interventions:   Food and/or Nutrient Delivery:  Continue Current Diet, Modify Oral Nutrition Supplement  Nutrition Education/Counseling:  No recommendation at this time   Coordination of Nutrition Care:  Continue to monitor while inpatient    Goals:  Pt will consume greater than 50% of meals and supplements or EN be initiated to provide adequate nutrition       Nutrition Monitoring and Evaluation:   Behavioral-Environmental Outcomes:  None Identified   Food/Nutrient Intake Outcomes:  Supplement Intake, Food and Nutrient Intake  Physical Signs/Symptoms Outcomes:  Biochemical Data, Diarrhea, Hemodynamic Status, Fluid Status or Edema, Weight, Skin     Discharge Planning:     Too soon to determine     Electronically signed by Marla Hoffmann RD, LD on 17/7/53 at 7:46 PM EST    Contact: [phone]

## 2021-11-10 NOTE — PROGRESS NOTES
11/10/21 1249   Vent Information   Equipment Changed HME   Vent Type 840   Vent Mode AC/PC   Vt Ordered 500 mL   Pressure Ordered 28   Rate Set 20 bmp   Peak Flow 50 L/min   FiO2  70 %   SpO2 93 %   SpO2/FiO2 ratio 132.86   Sensitivity 3   PEEP/CPAP 10   Humidification Source HME   Vent Patient Data   Peak Inspiratory Pressure 39 cmH2O   Mean Airway Pressure 19 cmH20   Rate Measured 21 br/min   Vt Exhaled 590 mL   Minute Volume 12.2 Liters   I:E Ratio 1:1.8   Spontaneous Breathing Trial (SBT) RT Doc   Pulse 78   Breath Sounds   Right Upper Lobe Diminished   Right Middle Lobe Diminished   Right Lower Lobe Diminished   Left Upper Lobe Diminished   Left Lower Lobe Diminished   Additional Respiratory  Assessments   Resp 21   Position Semi-Conteh's   Alarm Settings   High Pressure Alarm 50 cmH2O   Delay Alarm 20 sec(s)   Low Minute Volume Alarm 2.5 L/min   Apnea (secs) 20 secs   High Respiratory Rate 40 br/min   Low Exhaled Vt  250 mL   ETT (adult)   Placement Date/Time: 11/08/21 1122   Preoxygenation: Yes  Mask Ventilation: Ventilated by mask (1)  Technique: Direct laryngoscopy  Type: Cuffed  Tube Size: 8 mm  Laryngoscope: Mac  Blade Size: 3  Location: Oral  Insertion attempts: 1  Placement Verif. ..    Secured at 25 cm   Measured From [address] [place] [address],[address] By Commercial tube culver   Site Condition Dry

## 2021-11-10 NOTE — PROGRESS NOTES
Pulmonary and Critical Care  Progress Note      VITALS:  /67   Pulse 69   Temp 99.6 °F (37.6 °C) (Rectal)   Resp 15   Ht 5' 9.02\" (1.753 m)   Wt 216 lb 4.3 oz (98.1 kg)   SpO2 91%   BMI 31.92 kg/m²     Subjective:   CHIEF COMPLAINT :SOB     HPI:                The patient is on the vent and sedated. He is still on 70% fio2. He is not responding to painful stimuli. Objective:   PHYSICAL EXAM:    LUNGS:Occasional basal crackles  Abd-soft, BS+,NT  Ext- no pedal edema  CVS-s1s2, no murmurs      DATA:    CBC:  Recent Labs     11/08/21  0726 11/08/21  1815 11/10/21  0330   WBC 16.9* 23.7* 25.2*   RBC 5.17 4.54* 4.31*   HGB 16.2 14.4 13.7   HCT 50.9 43.3 40.9*    388 305   MCV 98.5 95.4 94.9   MCH 31.3* 31.7* 31.8*   MCHC 31.8* 33.3 33.5   RDW 13.0 13.0 13.0   SEGSPCT 90.5* 90.7* 90.8*      BMP:  Recent Labs     11/08/21  0726 11/08/21  1815 11/10/21  0330    141 142   K 4.8 5.4* 4.4   CL 99 104 109   CO2 28 25 24   BUN 50* 49* 30*   CREATININE 1.1 1.1 0.9   CALCIUM 9.6 8.5 9.0   GLUCOSE 115* 202* 129*      ABG:  Recent Labs     11/08/21  1700 11/09/21  0600 11/10/21  0600   PH 7.34 7.32* 7.31*   PO2ART 87 65* 59*   [id number] 50.0* 47.0* 48.0*   O2SAT 95.0* 92.1* 89.7*     BNP  No results found for: BNP   D-Dimer:  Lab Results   Component Value Date    DDIMER 837 (H) 10/29/2021      1. Radiology:   Similar appearance of extensive bilateral airspace opacities,       Endotracheal tube in appropriate position in the midthoracic trachea. Remaining tubes and lines are also unchanged. Assessment/Plan     Patient Active Problem List    Diagnosis Date Noted    Pneumonia due to COVID-19 virus 10/27/2021   s/p In hospital Cardiac arrest  VDRF  Acute Hypoxic resp failure- worsened  Bilateral Pneumonia sec to COVID-19  Obesity       1. Abx  2. F/u C&S  3. Keep sats > 88%  4. Barcitinib  5. Decadron  6. Insulin  7. Inhalers  8. Levophed for a MAP > 65 mmhg  9. Tube feeds  10. PT/OT  11.  CXR in [place] [place]. C.w present management    Electronically signed by Wyatt Eisenmenger, MD on 11/10/2021 at 12:02 PM

## 2021-11-10 NOTE — FLOWSHEET NOTE
Patient awaking spontaneously, attempting to sit up and raising arms towards face. Patient propofol increased, see eMAR. Will continue to monitor.

## 2021-11-10 NOTE — PROGRESS NOTES
11/10/21 1634   Vent Information   Equipment Changed Suction catheter   Vent Type 840   Vent Mode AC/PC   Vt Ordered 500 mL   Pressure Ordered 28   Rate Set 20 bmp   Peak Flow 50 L/min   FiO2  70 %   SpO2 94 %   SpO2/FiO2 ratio 134.29   Sensitivity 3   PEEP/CPAP 10   Humidification Source HME   Vent Patient Data   Peak Inspiratory Pressure 38 cmH2O   Mean Airway Pressure 19 cmH20   Rate Measured 20 br/min   Vt Exhaled 532 mL   Minute Volume 12.1 Liters   I:E Ratio 1:2.0   Plateau Pressure 26 YRK25   Static Compliance 34 mL/cmH2O   Total PEEP 12 cmH20   Auto PEEP 1.9 cmH20   Cough/Sputum   Sputum How Obtained Endotracheal; Suctioned   $Obtained Sample $Induced Sputum   Cough Non-productive   Frequency Occasional   Sputum Amount None   Sputum Color None   Tenacity None   Spontaneous Breathing Trial (SBT) RT Doc   Pulse 62   Breath Sounds   Right Upper Lobe Diminished   Right Middle Lobe Diminished   Right Lower Lobe Diminished   Left Upper Lobe Diminished   Left Lower Lobe Diminished   Additional Respiratory  Assessments   Resp 12   Position Semi-Conteh's   Oral Care Mouth suctioned   Alarm Settings   High Pressure Alarm 50 cmH2O   Delay Alarm 20 sec(s)   Low Minute Volume Alarm 2.5 L/min   Apnea (secs) 20 secs   High Respiratory Rate 40 br/min   Low Exhaled Vt  250 mL   ETT (adult)   Placement Date/Time: 11/08/21 1122   Preoxygenation: Yes  Mask Ventilation: Ventilated by mask (1)  Technique: Direct laryngoscopy  Type: Cuffed  Tube Size: 8 mm  Laryngoscope: Mac  Blade Size: 3  Location: Oral  Insertion attempts: 1  Placement Verif. ..    Secured at 25 cm   Measured From [address] [place] [address],[address] By Commercial tube culver   Site Condition Dry

## 2021-11-10 NOTE — PROGRESS NOTES
11/10/21 0903   Vent Information   $Ventilation $Subsequent Day   Vent Type 840   Vent Mode AC/PC   Pressure Ordered 28   Rate Set 20 bmp   Peak Flow 50 L/min   FiO2  70 %   SpO2 93 %   SpO2/FiO2 ratio 132.86   Sensitivity 3   PEEP/CPAP 10   Humidification Source HME   Nitric Oxide/Epoprostenol In Use? No   Vent Patient Data   Peak Inspiratory Pressure 39 cmH2O   Mean Airway Pressure 20 cmH20   Rate Measured 21 br/min   Vt Exhaled 685 mL   Minute Volume 13.4 Liters   I:E Ratio 1:2   Plateau Pressure 29 VPI92   Static Compliance 33 mL/cmH2O   Total PEEP 15 cmH20   Auto PEEP 4.2 cmH20   Cough/Sputum   Sputum How Obtained Endotracheal; Suctioned   $Obtained Sample $Induced Sputum   Cough Non-productive   Frequency Occasional   Sputum Amount None   Sputum Color None   Tenacity None   Spontaneous Breathing Trial (SBT) RT Doc   Pulse 76   Breath Sounds   Right Upper Lobe Diminished   Right Middle Lobe Diminished   Right Lower Lobe Diminished   Left Upper Lobe Clear   Left Lower Lobe Diminished   Additional Respiratory  Assessments   Resp 15   Position Semi-Conteh's   Oral Care Mouth suctioned   Subglottic Suction Done? Yes   Alarm Settings   High Pressure Alarm 50 cmH2O   Delay Alarm 20 sec(s)   Low Minute Volume Alarm 2.5 L/min   Apnea (secs) 20 secs   High Respiratory Rate 40 br/min   Low Exhaled Vt  250 mL   ETT (adult)   Placement Date/Time: 11/08/21 1122   Preoxygenation: Yes  Mask Ventilation: Ventilated by mask (1)  Technique: Direct laryngoscopy  Type: Cuffed  Tube Size: 8 mm  Laryngoscope: Mac  Blade Size: 3  Location: Oral  Insertion attempts: 1  Placement Verif. ..    Secured at 25 cm   Measured From [address] [place] [address],[address] By Commercial tube culver   Site Condition Dry

## 2021-11-10 NOTE — PROGRESS NOTES
11/10/21 0348   Vent Information   Skin Assessment Clean, dry, & intact   Vent Type 840   Vent Mode AC/PC   Pressure Ordered 28   Rate Set 20 bmp   FiO2  70 %   SpO2 92 %   SpO2/FiO2 ratio 131.43   Sensitivity 3   PEEP/CPAP 10   I Time/ I Time % 1 s   Humidification Source HME   Nitric Oxide/Epoprostenol In Use? No   Vent Patient Data   Peak Inspiratory Pressure 39 cmH2O   Mean Airway Pressure 20 cmH20   Rate Measured 21 br/min   Vt Exhaled 605 mL   Minute Volume 11.7 Liters   I:E Ratio 1:2   Cough/Sputum   Sputum How Obtained Endotracheal   Sputum Amount Small   Sputum Color Creamy   Tenacity Thick   Spontaneous Breathing Trial (SBT) RT Doc   Pulse 65   Breath Sounds   Right Upper Lobe Diminished   Right Middle Lobe Diminished   Right Lower Lobe Diminished   Left Upper Lobe Diminished   Left Lower Lobe Diminished   Additional Respiratory  Assessments   Resp 13   Position Semi-Conteh's   Oral Care Completed? No   Subglottic Suction Done? Yes   Alarm Settings   High Pressure Alarm 50 cmH2O   Low Minute Volume Alarm 2.5 L/min   Apnea (secs) 20 secs   High Respiratory Rate 40 br/min   Low Exhaled Vt  250 mL   ETT (adult)   Placement Date/Time: 11/08/21 1122   Preoxygenation: Yes  Mask Ventilation: Ventilated by mask (1)  Technique: Direct laryngoscopy  Type: Cuffed  Tube Size: 8 mm  Laryngoscope: Mac  Blade Size: 3  Location: Oral  Insertion attempts: 1  Placement Verif. ..    Secured at 24 cm   Measured From Lips   ET Placement Right   Secured By Commercial tube culver   Site Condition Dry   Cuff Pressure   (mov)

## 2021-11-10 NOTE — PROGRESS NOTES
.        Hospitalist Progress Note      Name:  Samantha Delgadillo /Age/Sex: 1955  (40 y.o. male)   MRN & CSN:  [id number] & [id number] Admission Date/Time: 10/26/2021 11:34 PM   Location:  -A PCP: Deepthi Mcnair MD         Hospital Day: 16    Assessment and Plan:   Samantha Delgadillo is a 78 y.o.  male  who presents with <principal problem not specified>    1. Acute hypoxic respiratory failure: Due to COVID-19 pneumonia. On vent support. Pulmonary following.     2.  COVID-19 pneumonia: Continue dexamethasone and baricitinib.      3.  Chest pain: Related to COVID-19 pneumonia. CTA on 10/27-no pulmonary embolism. Resolved.     4. Hypotension: On pressors. Wean as tolerated. Empirically on iv antibiotics. Possible sepsis. Leukocytosis 23. 7.     5. Hyperlipidemia: Continue statin.     6. Hematuria. Urology following     DVT prophylaxis: Lovenox  Disposition:    Diet No diet orders on file   DVT Prophylaxis [] Lovenox, []  Heparin, [] SCDs, [] Ambulation   GI Prophylaxis [] PPI,  [] H2 Blocker,  [] Carafate,  [] Diet/Tube Feeds   Code Status Full Code   Disposition Patient requires continued admission due to    MDM [] Low, [] Moderate,[]  High  Patient's risk as above due to      History of Present Illness:     Chief Complaint: <principal problem not specified>  Samantha Delgadillo is a 78 y.o.  male  who presents with covid pneumonia. Patient intubated 2021 placed on vent support after he coded, was in PEA, ventricular fibrillation for which he was defibrillated. Ten point ROS reviewed negative, unless as noted above    Objective: Intake/Output Summary (Last 24 hours) at 11/10/2021 1526  Last data filed at 11/10/2021 1347  Gross per 24 hour   Intake 10 ml   Output 3650 ml   Net -3640 ml      Vitals:   Vitals:    11/10/21 1430   BP: (!) 124/56   Pulse: 68   Resp: 15   Temp:    SpO2: 93%     Physical Exam:   GEN Sedated male, no apparent distress. Appears given age.   EYES Pupils are equally round. No scleral erythema, discharge, or conjunctivitis. HENT Mucous membranes are moist. Oral pharynx without exudates, no evidence of thrush. NECK Supple, no apparent thyromegaly or masses. RESP Diminished breath sounds, few rhonchi. Symmetric chest movement. CARDIO/VASC S1/S2 auscultated. Regular rate without appreciable murmurs, rubs, or gallops. No JVD or carotid bruits. Peripheral pulses equal bilaterally and palpable. No peripheral edema. GI Abdomen is soft without significant tenderness, masses, or guarding. Bowel sounds are normoactive. Rectal exam deferred. MSK No gross joint deformities. SKIN Normal coloration, warm, dry. NEURO Sedated.       Medications:   Medications:    oxymetazoline  2 spray Each Nostril Once    lidocaine   Topical Once    dexamethasone  6 mg IntraVENous Daily    cefepime  2,000 mg IntraVENous Q8H    vancomycin  1,500 mg IntraVENous Q18H    albuterol sulfate HFA  4 puff Inhalation Q4H    ipratropium  4 puff Inhalation Q4H WA    tamsulosin  0.4 mg Oral Daily    atenolol  50 mg Oral BID    metoclopramide  10 mg IntraVENous Q8H    pantoprazole  40 mg IntraVENous Daily    losartan  100 mg Oral Daily    atorvastatin  10 mg Oral Daily    sodium chloride flush  5-40 mL IntraVENous 2 times per day    enoxaparin  30 mg SubCUTAneous BID      Infusions:    norepinephrine 15 mcg/min (11/10/21 1143)    propofol 65 mcg/kg/min (11/10/21 1440)    fentaNYL 50 mcg/hr (11/10/21 1402)    vasopressin (Septic Shock) infusion Stopped (11/08/21 1315)    sodium chloride 150 mL/hr at 11/10/21 1442    sodium chloride       PRN Meds: sodium chloride flush, 5-40 mL, PRN  ALPRAZolam, 0.25 mg, Q8H PRN  sodium chloride, 25 mL, PRN  sodium chloride flush, 5-40 mL, PRN  ondansetron, 4 mg, Q8H PRN   Or  ondansetron, 4 mg, Q6H PRN  polyethylene glycol, 17 g, Daily PRN  acetaminophen, 650 mg, Q6H PRN   Or  acetaminophen, 650 mg, Q6H PRN            Electronically signed by Aric Aldana Sid Kern MD on 11/10/2021 at 3:26 PM

## 2021-11-11 NOTE — FLOWSHEET NOTE
RT Kelsie Medina notified of [facility] nursing order to increase PEEP to 15 and decrease FiO2

## 2021-11-11 NOTE — PROGRESS NOTES
[address] [facility]  consulted by Dr. Danielle Caro for monitoring and adjustment. Indication for treatment: Sepsis  Goal trough: 15 mcg/mL  AUC/SHANNON: 400-600    Pertinent Laboratory Values:   Temp Readings from Last 3 Encounters:   11/11/21 99.9 °F (37.7 °C) (Rectal)   12/15/15 98.1 °F (36.7 °C) (Temporal)     Recent Labs     11/08/21  1815 11/10/21  0330 11/11/21  0520   WBC 23.7* 25.2* 13.5*     Recent Labs     11/08/21  1815 11/10/21  0330 11/11/21  0520   BUN 49* 30* 22   CREATININE 1.1 0.9 0.7*     Estimated Creatinine Clearance: 124 mL/min (A) (based on SCr of 0.7 mg/dL (L)). Intake/Output Summary (Last 24 hours) at 11/11/2021 1536  Last data filed at 11/11/2021 0816  Gross per 24 hour   Intake 1889.94 ml   Output 1590 ml   Net 299.94 ml       Pertinent Cultures:  Date    Source    Results  11/8   Blood    Ordered    Vancomycin level:   TROUGH:    Recent Labs     11/10/21  0330   VANCOTROUGH 9.6*     RANDOM:  No results for input(s): VANCORANDOM in the last 72 hours. Assessment:  · WBC and temperature: WBC improving @ 13.5; TMax 99.9 F  · SCr, BUN, and urine output:   · Scr stable, BUN improved; SCr @ 0.7, BUN @ 22  · Day(s) of therapy: 4  · Vancomycin concentration:   · 11/10:  9.6, 18h post-dose    Plan:  · Continue on Vancomycin 1500 mg IVPB q18h   · Trough = 9.6, AUC = 445  · Accumulation is expected. · Repeat level tomorrow AM  · Pharmacy will continue to monitor patient and adjust therapy as indicated    Ramesh 3 11/12 @ 0800    Thank you for the consult.   Ronni Fry, [facility]  11/11/2021 3:36 PM

## 2021-11-11 NOTE — PROGRESS NOTES
.        Hospitalist Progress Note      Name:  Gee Rangel /Age/Sex: 1955  (2 y.o. male)   MRN & CSN:  [id number] & [id number] Admission Date/Time: 10/26/2021 11:34 PM   Location:  -A PCP: Adam Garcia MD         Hospital Day: 17    Assessment and Plan:   Gee Rangel is a 78 y.o.  male  who presents with <principal problem not specified>    1. Acute hypoxic respiratory failure: Due to COVID-19 pneumonia. On vent support. Pulmonary following.     2.  COVID-19 pneumonia: Continue dexamethasone and baricitinib. Empiric antibiotics     3. Chest pain: Related to COVID-19 pneumonia. CTA on 10/27-no pulmonary embolism. Resolved.     4. Hypotension: On pressors. Wean as tolerated. Empirically on iv antibiotics. Possible sepsis. Leukocytosis 23. 7.     5. Hyperlipidemia: Continue statin.     6. Hematuria. Urology following     DVT prophylaxis: Lovenox  Disposition:    Diet No diet orders on file   DVT Prophylaxis [] Lovenox, []  Heparin, [] SCDs, [] Ambulation   GI Prophylaxis [] PPI,  [] H2 Blocker,  [] Carafate,  [] Diet/Tube Feeds   Code Status Full Code   Disposition Patient requires continued admission due to    MDM [] Low, [] Moderate,[]  High  Patient's risk as above due to      History of Present Illness:     Chief Complaint: <principal problem not specified>  Gee Rangel is a 78 y.o.  male  who presents with covid pneumonia. Patient intubated 2021 placed on vent support after he coded, was in PEA, ventricular fibrillation for which he was defibrillated. Ten point ROS reviewed negative, unless as noted above    Objective: Intake/Output Summary (Last 24 hours) at 2021 1515  Last data filed at 2021 0816  Gross per 24 hour   Intake 1889.94 ml   Output 1590 ml   Net 299.94 ml      Vitals:   Vitals:    21 1445   BP: 120/63   Pulse: 90   Resp: 15   Temp:    SpO2: 90%     Physical Exam:   GEN Sedated male, no apparent distress.  Appears given age.  Cyndra Arlette are equally round. No scleral erythema, discharge, or conjunctivitis. HENT Mucous membranes are moist. Oral pharynx without exudates, no evidence of thrush. NECK Supple, no apparent thyromegaly or masses. RESP Diminished breath sounds, few rhonchi. Symmetric chest movement. CARDIO/VASC S1/S2 auscultated. Regular rate without appreciable murmurs, rubs, or gallops. No JVD or carotid bruits. Peripheral pulses equal bilaterally and palpable. No peripheral edema. GI Abdomen is soft without significant tenderness, masses, or guarding. Bowel sounds are normoactive. Rectal exam deferred. MSK No gross joint deformities. SKIN Normal coloration, warm, dry. NEURO Sedated.       Medications:   Medications:    lidocaine   Topical Once    dexamethasone  6 mg IntraVENous Daily    cefepime  2,000 mg IntraVENous Q8H    vancomycin  1,500 mg IntraVENous Q18H    albuterol sulfate HFA  4 puff Inhalation Q4H    ipratropium  4 puff Inhalation Q4H WA    tamsulosin  0.4 mg Oral Daily    atenolol  50 mg Oral BID    metoclopramide  10 mg IntraVENous Q8H    pantoprazole  40 mg IntraVENous Daily    losartan  100 mg Oral Daily    atorvastatin  10 mg Oral Daily    sodium chloride flush  5-40 mL IntraVENous 2 times per day    enoxaparin  30 mg SubCUTAneous BID      Infusions:    norepinephrine 2 mcg/min (11/11/21 1310)    propofol 70 mcg/kg/min (11/11/21 1420)    fentaNYL 50 mcg/hr (11/11/21 1058)    vasopressin (Septic Shock) infusion Stopped (11/08/21 1315)    sodium chloride 150 mL/hr at 11/11/21 1422    sodium chloride       PRN Meds: sodium chloride flush, 5-40 mL, PRN  ALPRAZolam, 0.25 mg, Q8H PRN  sodium chloride, 25 mL, PRN  sodium chloride flush, 5-40 mL, PRN  ondansetron, 4 mg, Q8H PRN   Or  ondansetron, 4 mg, Q6H PRN  polyethylene glycol, 17 g, Daily PRN  acetaminophen, 650 mg, Q6H PRN   Or  acetaminophen, 650 mg, Q6H PRN            Electronically signed by Ada Slater MD on 11/11/2021 at 3:15 PM

## 2021-11-11 NOTE — PROGRESS NOTES
11/11/21 1704   Vent Information   Vent Type 840   Vent Mode AC/PC   Pressure Ordered 28   Rate Set 20 bmp   FiO2  70 %   SpO2 90 %   SpO2/FiO2 ratio 128.57   PEEP/CPAP 10   I Time/ I Time % 0.8 s   Vent Patient Data   Peak Inspiratory Pressure 39 cmH2O   Mean Airway Pressure 22 cmH20   Rate Measured 25 br/min   Vt Exhaled 520 mL   Minute Volume 12.5 Liters   I:E Ratio 1:2.3   Cough/Sputum   Sputum How Obtained Endotracheal; Suctioned   $Obtained Sample $Induced Sputum   Cough Strong   Frequency Occasional   Sputum Amount Small   Sputum Color Tan   Spontaneous Breathing Trial (SBT) RT Doc   Pulse 87   Additional Respiratory  Assessments   Resp 13   Alarm Settings   High Pressure Alarm 50 cmH2O   Press Low Alarm 3 cmH2O   Delay Alarm 20 sec(s)   Low Minute Volume Alarm 2.5 L/min   Apnea (secs) 20 secs   High Respiratory Rate 40 br/min   Resp Rate Low Alarm 13   Low Exhaled Vt  250 mL   ETT (adult)   Placement Date/Time: 11/08/21 1122   Preoxygenation: Yes  Mask Ventilation: Ventilated by mask (1)  Technique: Direct laryngoscopy  Type: Cuffed  Tube Size: 8 mm  Laryngoscope: Mac  Blade Size: 3  Location: Oral  Insertion attempts: 1  Placement Verif. ..    Secured at 25 cm   Measured From [address] [place] [address],[address] By [employer] tube culver   Site Condition Dry

## 2021-11-11 NOTE — PROGRESS NOTES
Pulmonary and Critical Care  Progress Note      VITALS:  BP (!) 121/55   Pulse 85   Temp 99 °F (37.2 °C) (Rectal)   Resp 20   Ht 5' 9.02\" (1.753 m)   Wt 237 lb 10.5 oz (107.8 kg)   SpO2 91%   BMI 35.08 kg/m²     Subjective:   CHIEF COMPLAINT :SOB     HPI:                The patient is on the vent and sedated. He is still on 70% fio2. He has minimal response to painful stimuli. Objective:   PHYSICAL EXAM:    LUNGS:Occasional basal crackles  Abd-soft, BS+,NT  Ext- no pedal edema  CVS-s1s2, no murmurs      DATA:    CBC:  Recent Labs     11/08/21  1815 11/10/21  0330 11/11/21  0520   WBC 23.7* 25.2* 13.5*   RBC 4.54* 4.31* 3.72*   HGB 14.4 13.7 11.7*   HCT 43.3 40.9* 35.7*    305 182   MCV 95.4 94.9 96.0   MCH 31.7* 31.8* 31.5*   MCHC 33.3 33.5 32.8   RDW 13.0 13.0 13.3   SEGSPCT 90.7* 90.8* 88.9*      BMP:  Recent Labs     11/08/21  1815 11/10/21  0330 11/11/21  0520    142 142   K 5.4* 4.4 3.9    109 110   CO2 25 24 23   BUN 49* 30* 22   CREATININE 1.1 0.9 0.7*   CALCIUM 8.5 9.0 8.3   GLUCOSE 202* 129* 119*      ABG:  Recent Labs     11/09/21  0600 11/10/21  0600 11/11/21  0600   PH 7.32* 7.31* 7.32*   PO2ART 65* 59* 83   FQP4EEM 47.0* 48.0* 48.0*   O2SAT 92.1* 89.7* 94.6*     BNP  No results found for: BNP   D-Dimer:  Lab Results   Component Value Date    DDIMER 837 (H) 10/29/2021      1. Radiology: Stable chest x-ray with multifocal opacities.  Lines and tubes are acceptable      Assessment/Plan     Patient Active Problem List    Diagnosis Date Noted    Pneumonia due to COVID-19 virus 10/27/2021   s/p In hospital Cardiac arrest  VDRF  Acute Hypoxic resp failure- worsened  Bilateral Pneumonia sec to COVID-19  Obesity       1. Abx  2. F/u C&S  3. Decadron  4. Insulin  5. Inhalers  6. Pressors for a MAP > 65 mmhg  7. Keep sats >88%  8. Prone ventilation as tolerated  9.  C.w present management    Electronically signed by Kalina Plaza MD on 11/11/2021 at 11:15 AM

## 2021-11-12 NOTE — CONSULTS
CARDIOLOGY CONSULT NOTE   Reason for consultation:  NSVT     Referring physician:  Vinnie Garcia MD     Primary care physician: Vera Tyler MD      Dear  Dr. Vinnie Garcia MD   Thanks for the consult. Chief Complaints :  Chief Complaint   Patient presents with    Other     covid + low 02 at home symptoms started last thursday tested positive this weekend.  Shortness of Breath        History of present illness:Cecilio is a 78 y. o.year old who presents with Covid pneumonia hypoxia currently intubated and sedated. Cardiology consulted due to runs of wide-complex tachycardia which did not sustain he is hypoxic last night with sats in 70%  EKG shows normal sinus rhythm with LVH, he is requiring some pressors    Past medical history:    has a past medical history of Hyperlipidemia and Hypertension. Past surgical history:   has a past surgical history that includes Ankle surgery; knee surgery; and Hand surgery. Social History:   reports that he has never smoked. He does not have any smokeless tobacco history on file. He reports current alcohol use. He reports that he does not use drugs.   Family history:   no family history of CAD, STROKE of DM at early age    Allergies   Allergen Reactions    Amoxicillin        norepinephrine (LEVOPHED) 16 mg in dextrose 5% 250 mL infusion, Continuous  propofol injection, Titrated  fentaNYL (SUBLIMAZE) 1,000 mcg in sodium chloride 0.9% 100 mL infusion, Continuous  sodium chloride flush 0.9 % injection 5-40 mL, PRN  lidocaine (XYLOCAINE) 2 % jelly, Once  vasopressin 20 Units in dextrose 5 % 100 mL infusion, Continuous  dexamethasone (DECADRON) injection 6 mg, Daily  cefepime (MAXIPIME) 2000 mg IVPB minibag, Q8H  vancomycin (VANCOCIN) 1500 mg in 300 mL IVPB, Q18H  albuterol sulfate  (90 Base) MCG/ACT inhaler 4 puff, Q4H  ipratropium (ATROVENT HFA) 17 MCG/ACT inhaler 4 puff, Q4H WA  tamsulosin (FLOMAX) capsule 0.4 mg, Daily  ALPRAZolam (XANAX) tablet 0.25 mg, Q8H PRN  atenolol (TENORMIN) tablet 50 mg, BID  metoclopramide (REGLAN) injection 10 mg, Q8H  0.9 % sodium chloride infusion, PRN  pantoprazole (PROTONIX) injection 40 mg, Daily  losartan (COZAAR) tablet 100 mg, Daily  atorvastatin (LIPITOR) tablet 10 mg, Daily  sodium chloride flush 0.9 % injection 5-40 mL, 2 times per day  sodium chloride flush 0.9 % injection 5-40 mL, PRN  enoxaparin (LOVENOX) injection 30 mg, BID  ondansetron (ZOFRAN-ODT) disintegrating tablet 4 mg, Q8H PRN   Or  ondansetron (ZOFRAN) injection 4 mg, Q6H PRN  polyethylene glycol (GLYCOLAX) packet 17 g, Daily PRN  acetaminophen (TYLENOL) tablet 650 mg, Q6H PRN   Or  acetaminophen (TYLENOL) suppository 650 mg, Q6H PRN      Current Facility-Administered Medications   Medication Dose Route Frequency Provider Last Rate Last Admin    norepinephrine (LEVOPHED) 16 mg in dextrose 5% 250 mL infusion  2-100 mcg/min IntraVENous Continuous Homa Mercer MD 9.4 mL/hr at 11/12/21 1145 10 mcg/min at 11/12/21 1145    propofol injection  5-50 mcg/kg/min IntraVENous Titrated Homa Mercer MD 38.3 mL/hr at 11/12/21 1127 65 mcg/kg/min at 11/12/21 1127    fentaNYL (SUBLIMAZE) 1,000 mcg in sodium chloride 0.9% 100 mL infusion  12.5-200 mcg/hr IntraVENous Continuous Homa Mercer MD 15 mL/hr at 11/12/21 1319 150 mcg/hr at 11/12/21 1319    sodium chloride flush 0.9 % injection 5-40 mL  5-40 mL IntraVENous PRN Bella Chamberlain MD        lidocaine (XYLOCAINE) 2 % jelly   Topical Once Belal Chamberlain MD        vasopressin 20 Units in dextrose 5 % 100 mL infusion  0.01-0.03 Units/min IntraVENous Continuous Homa Mercer MD   Held at 11/08/21 1315    dexamethasone (DECADRON) injection 6 mg  6 mg IntraVENous Daily Homa Mercer MD   6 mg at 11/12/21 0850    cefepime (MAXIPIME) 2000 mg IVPB minibag  2,000 mg IntraVENous Q8H Homa Mercer MD   Stopped at 11/12/21 0719    vancomycin (VANCOCIN) 1500 mg in 300 mL IVPB  1,500 mg IntraVENous Q18H Ara Manjarrez MD   Stopped at 11/12/21 1319    albuterol sulfate  (90 Base) MCG/ACT inhaler 4 puff  4 puff Inhalation Q4H Riley Reynolds MD   4 puff at 11/12/21 0911    ipratropium (ATROVENT HFA) 17 MCG/ACT inhaler 4 puff  4 puff Inhalation Q4H WA Riley Reynolds MD   4 puff at 11/12/21 0911    tamsulosin (FLOMAX) capsule 0.4 mg  0.4 mg Oral Daily Danita Duane, PA-C   0.4 mg at 11/12/21 0851    ALPRAZolam (XANAX) tablet 0.25 mg  0.25 mg Oral Q8H PRN Ara Manjarrez MD   0.25 mg at 11/07/21 1543    atenolol (TENORMIN) tablet 50 mg  50 mg Oral BID Rachel Buck MD   50 mg at 11/12/21 0850    metoclopramide (REGLAN) injection 10 mg  10 mg IntraVENous Q8H Eugene Matute MD   10 mg at 11/12/21 0536    0.9 % sodium chloride infusion  25 mL IntraVENous PRN Eugene Matute MD        pantoprazole (PROTONIX) injection 40 mg  40 mg IntraVENous Daily Eugene Matute MD   40 mg at 11/12/21 0849    losartan (COZAAR) tablet 100 mg  100 mg Oral Daily Nile Chelsea Graham MD   100 mg at 11/12/21 0850    atorvastatin (LIPITOR) tablet 10 mg  10 mg Oral Daily Nile Chelsea Graham MD   10 mg at 11/12/21 0850    sodium chloride flush 0.9 % injection 5-40 mL  5-40 mL IntraVENous 2 times per day Alba WOLFE MD   10 mL at 11/12/21 0850    sodium chloride flush 0.9 % injection 5-40 mL  5-40 mL IntraVENous PRN Nilenestor Graham MD   10 mL at 11/06/21 0820    enoxaparin (LOVENOX) injection 30 mg  30 mg SubCUTAneous BID Nile Chelsea Graham MD   30 mg at 11/12/21 0849    ondansetron (ZOFRAN-ODT) disintegrating tablet 4 mg  4 mg Oral Q8H PRN Nile Chelsea Graham MD   4 mg at 11/01/21 0307    Or    ondansetron (ZOFRAN) injection 4 mg  4 mg IntraVENous Q6H PRN Nile Graham MD   4 mg at 11/06/21 0817    polyethylene glycol (GLYCOLAX) packet 17 g  17 g Oral Daily PRN Dipti Alexander MD   17 g at 11/05/21 2308    acetaminophen (TYLENOL) tablet 650 mg  650 mg Oral Q6H PRN Nile Graham MD   650 mg at 11/06/21 1134 last 72 hours. Lab Results   Component Value Date    INR 1.05 10/28/2021    PROTIME 13.5 10/28/2021       EKG: (reviewed by myself)    ECHO:(reviewed by myself)    Chest Xray:(reviewed by myself)  XR ABDOMEN (KUB) (SINGLE AP VIEW)    Result Date: 11/5/2021  EXAMINATION: ONE SUPINE XRAY VIEW(S) OF THE ABDOMEN 11/5/2021 9:04 pm COMPARISON: Lumbar spine radiographs 09/15/2015 HISTORY: ORDERING SYSTEM PROVIDED HISTORY: abdominal pain TECHNOLOGIST PROVIDED HISTORY: Reason for exam:->abdominal pain Reason for Exam: abdominal pain Acuity: Acute Type of Exam: Initial Additional signs and symptoms: na Relevant Medical/Surgical History: na FINDINGS: 2 images submitted for review. Nonspecific, nonobstructive bowel gas pattern. Punctate calcifications project over the kidneys. No acute osseous abnormality is seen. There are degenerative changes in the lumbar spine. Pelvic phleboliths are redemonstrated. 1. No evidence of bowel obstruction. 2. Suspected bilateral nephrolithiasis. CT PELVIS WO CONTRAST Additional Contrast? None    Result Date: 11/7/2021  EXAMINATION: CT OF THE PELVIS WITHOUT CONTRAST 11/7/2021 1:29 am TECHNIQUE: CT of the pelvis was performed without the administration of intravenous contrast.  Multiplanar reformatted images are provided for review. Adjustment of mA and/or kV according to patient size was utilized. Dose modulation, iterative reconstruction, and/or weight based adjustment of the mA/kV was utilized to reduce the radiation dose to as low as reasonably achievable. COMPARISON: None. HISTORY: ORDERING SYSTEM PROVIDED HISTORY: Dalton TECHNOLOGIST PROVIDED HISTORY: Reason for exam:->Dalton Additional Contrast?->None Reason for Exam: pulled on dalton Acuity: Acute Type of Exam: Initial FINDINGS: The pelvic ring is intact. There is no fracture. Degenerative changes are noted along the sacroiliac joints. The bladder is partially distended with urine.   The Dalton catheter has been retracted with balloon inflated in the urethra along the penile portion. Prostate and seminal vesicles are unremarkable. No inguinal or pelvic sidewall adenopathy. Vascular calcifications are noted in the iliac arteries. No inguinal or pelvic sidewall adenopathy. There is a left inguinal hernia containing fat. No appreciable soft tissue swelling. 1. A Gómez catheter has been pulled back with the balloon portion inflated in the penile urethra. Repositioning is recommended. The catheter is not within the bladder. XR CHEST PORTABLE    Result Date: 11/12/2021  EXAMINATION: ONE XRAY VIEW OF THE CHEST 11/12/2021 2:20 am COMPARISON: 11/11/2021 and 11/10/2021 HISTORY: ORDERING SYSTEM PROVIDED HISTORY: mucous plug? hypoxia TECHNOLOGIST PROVIDED HISTORY: Reason for exam:->mucous plug? hypoxia Reason for Exam: mucous plug? hypoxia Acuity: Acute Type of Exam: Initial Mechanism of Injury: mucous plug? hypoxia Relevant Medical/Surgical History: mucous plug? hypoxia FINDINGS: Endotracheal tube, nasogastric tube, and right jugular line are redemonstrated. Lungs are hypoinflated. The apparent increase in the degree of opacities in the right lung may be due to the decreased inflation. Left lung is relatively stable and may have a small amount of left pleural effusion. No obvious pneumothorax is seen. Cardiac silhouette is not enlarged. Multifocal opacities are redemonstrated. The apparent worsening of the right lung may be due to decreased inflation. XR CHEST PORTABLE    Result Date: 11/11/2021  EXAMINATION: ONE XRAY VIEW OF THE CHEST 11/11/2021 2:45 am COMPARISON: 11/10/2021 and 11/09/2021 HISTORY: ORDERING SYSTEM PROVIDED HISTORY: ett placement TECHNOLOGIST PROVIDED HISTORY: Reason for exam:->ett placement Reason for Exam: vent Acuity: Acute Type of Exam: Ongoing FINDINGS: Endotracheal tube, nasogastric tube, and right jugular line are acceptable. Multifocal opacities are again noted in both lungs.   The distribution is similar to the recent exams. Cardiac silhouette is not enlarged. No pleural effusion or pneumothorax are identified. Stable chest x-ray with multifocal opacities. Lines and tubes are acceptable. XR CHEST PORTABLE    Result Date: 11/10/2021  EXAMINATION: ONE XRAY VIEW OF THE CHEST 11/9/2021 4:09 am COMPARISON: 11/08/2021 HISTORY: ORDERING SYSTEM PROVIDED HISTORY: ett placement TECHNOLOGIST PROVIDED HISTORY: Reason for exam:->ett placement Reason for Exam: vent Acuity: Acute Type of Exam: Ongoing FINDINGS: The endotracheal tube, nasogastric tube and right IJ catheter remain. There is no pneumothorax. Moderate to severe bilateral airspace disease appears similar to the previous exam.  A pneumothorax is not appreciated. No change in life support. Moderate to severe bilateral airspace disease, similar to the prior exam.     XR CHEST PORTABLE    Result Date: 11/10/2021  EXAMINATION: ONE XRAY VIEW OF THE CHEST 11/10/2021 3:50 am COMPARISON: Chest radiograph 11/09/2021 HISTORY: ORDERING SYSTEM PROVIDED HISTORY: ett placement TECHNOLOGIST PROVIDED HISTORY: Reason for exam:->ett placement Reason for Exam: vent Acuity: Acute Type of Exam: Ongoing FINDINGS: cardiomediastinal silhouette is unchanged. No definite pleural effusion or pneumothorax. Bilateral perihilar interstitial airspace opacities are unchanged. No acute osseous abnormality. Endotracheal tube in place with tip approximately 5 cm above the eladia. Enteric tube in place with tip and side port below the diaphragm and outside the field of view of this study. Right IJ central venous catheter in place with tip at the superior cavoatrial junction, also unchanged. Similar appearance of extensive bilateral airspace opacities, Endotracheal tube in appropriate position in the midthoracic trachea. Remaining tubes and lines are also unchanged.      XR CHEST PORTABLE    Result Date: 11/9/2021  EXAMINATION: ONE X-RAY VIEW OF THE CHEST 11/8/2021 1:16 pm COMPARISON: 11/05/2021 HISTORY: ORDERING SYSTEM PROVIDED HISTORY: ETT placement TECHNOLOGIST PROVIDED HISTORY: Reason for exam:->ett placement Reason for Exam: ETT and ng placement Type of Exam: Subsequent/Follow-up Initial evaluation FINDINGS: Monitor wires overlie the chest.  The patient has been intubated. The tip of the endotracheal tube is at the T4 level. There is a new right internal jugular central venous line with the tip in the superior vena cava. There is no pneumothorax. An NG tube courses below the level of the diaphragm and the tip is not included on this film. Patchy bilateral pulmonary opacities are unchanged. Endotracheal tube with the tip at T4. New right internal jugular central venous line with the tip in the superior vena cava. No pneumothorax. Bilateral pulmonary opacities are unchanged. XR CHEST PORTABLE    Result Date: 11/5/2021  EXAMINATION: ONE XRAY VIEW OF THE CHEST 11/5/2021 9:04 pm COMPARISON: 11/04/2021 HISTORY: ORDERING SYSTEM PROVIDED HISTORY: f/u on Trace biapical pneumothoraces pneumomediastinum and pneumopericardium TECHNOLOGIST PROVIDED HISTORY: Reason for exam:->f/u on Trace biapical pneumothoraces pneumomediastinum and pneumopericardium Reason for Exam: f/u on Trace biapical pneumothoraces pneumomediastinum and pneumopericardium Acuity: Acute Type of Exam: Initial Additional signs and symptoms: na Relevant Medical/Surgical History: hypertension FINDINGS: The cardiac silhouette and mediastinal contours are stable. There are bilateral lung infiltrates, unchanged. There is a tiny right apical pneumothorax. No left pneumothorax is identified. Pneumomediastinum is again noted. Subcutaneous emphysema is noted in the supraclavicular regions. The visualized osseous structures are unremarkable. 1. Bilateral lung infiltrates, compatible with pneumonia. 2. Trace right apical pneumothorax. 3. Pneumomediastinum. 4. No left pneumothorax.      XR CHEST PORTABLE    Result Date: 11/4/2021  EXAMINATION: ONE XRAY VIEW OF THE CHEST 11/4/2021 1:57 am COMPARISON: Chest radiograph dated November 3, 2021 HISTORY: ORDERING SYSTEM PROVIDED HISTORY: Acute hypoxemic respiratory failure, Covid 19 pneumonia TECHNOLOGIST PROVIDED HISTORY: Reason for exam:->Acute hypoxemic respiratory failure, Covid 19 pneumonia Reason for Exam: Acute hypoxemic respiratory failure, Covid 19 pneumonia Acuity: Unknown Type of Exam: Subsequent/Follow-up Additional signs and symptoms: Acute hypoxemic respiratory failure, Covid 19 pneumonia Relevant Medical/Surgical History: Acute hypoxemic respiratory failure, Covid 19 pneumonia FINDINGS: Pneumomediastinum and pneumopericardium are noted without significant change. There is a small stable right apical pneumothorax. Trace left apical pneumothorax is noted. Bilateral airspace opacities are seen without significant change. Subcutaneous emphysematous changes are noted overlying the base of the neck. 1. Diffuse bilateral airspace opacities without significant change. 2. Trace biapical pneumothoraces pneumomediastinum and pneumopericardium with subcutaneous emphysematous changes involving the base the neck. No significant change. XR CHEST PORTABLE    Result Date: 11/3/2021  EXAMINATION: ONE XRAY VIEW OF THE CHEST 11/3/2021 6:34 am COMPARISON: Chest radiograph dated November 1, 2021 HISTORY: ORDERING SYSTEM PROVIDED HISTORY: Bilateral Covid pneumonia, acute hypoxemic respiratory failure TECHNOLOGIST PROVIDED HISTORY: Reason for exam:->Bilateral Covid pneumonia, acute hypoxemic respiratory failure Reason for Exam: Bilateral Covid pneumonia, acute hypoxemic respiratory failure Acuity: Unknown Type of Exam: Unknown FINDINGS: Pneumomediastinum and pneumopericardium is noted. Trace biapical pneumothoraces are present. Diffuse bilateral airspace opacities are noted. Subcutaneous emphysematous changes are seen overlying the base of the neck.      1. Extensive bilateral hypoxemic respiratory failure Reason for Exam: COVID-19 pneumonia with acute hypoxemic respiratory failure Acuity: Acute Type of Exam: Initial FINDINGS: New pneumomediastinum and subcutaneous emphysema in the right cervical region. Similar bilateral airspace opacities. Trace right apical pneumothorax. No gross bony abnormality. New pneumomediastinum, subcutaneous emphysema and trace right apical pneumothorax. XR CHEST PORTABLE    Result Date: 10/28/2021  EXAMINATION: ONE XRAY VIEW OF THE CHEST 10/28/2021 2:10 am COMPARISON: CT dated 10/27/2021 HISTORY: ORDERING SYSTEM PROVIDED HISTORY: Hypoxia TECHNOLOGIST PROVIDED HISTORY: Reason for exam:->Hypoxia Reason for Exam: Hypoxia Acuity: Acute Type of Exam: Initial FINDINGS: The heart size is upper normal.  There is extensive airspace disease bilaterally. A pneumothorax is not identified. Findings most consistent with severe bilateral COVID pneumonia. This appears progressed since the recent prior CT exam.     CTA PULMONARY W CONTRAST    Result Date: 10/28/2021  EXAMINATION: CTA OF THE CHEST 10/27/2021 1:58 am TECHNIQUE: CTA of the chest was performed after the administration of intravenous contrast.  Multiplanar reformatted images are provided for review. MIP images are provided for review. Dose modulation, iterative reconstruction, and/or weight based adjustment of the mA/kV was utilized to reduce the radiation dose to as low as reasonably achievable. COMPARISON: None. HISTORY: ORDERING SYSTEM PROVIDED HISTORY: Shortness of breath, hypoxia, covid + TECHNOLOGIST PROVIDED HISTORY: Reason for exam:->Shortness of breath, hypoxia, covid + Decision Support Exception - unselect if not a suspected or confirmed emergency medical condition->Emergency Medical Condition (MA) Reason for Exam: Shortness of breath, hypoxia, covid + Type of Exam: Initial FINDINGS: Pulmonary Arteries: Pulmonary arteries are adequately opacified for evaluation.   No evidence of intraluminal filling defect to suggest pulmonary embolism. Main pulmonary artery is normal in caliber. Mediastinum: Heart is normal in size without a pericardial effusion. The aorta, arch branches, and main pulmonary artery are normal in course and caliber. Mildly prominent subcarinal lymph node measures up to 17 mm in short axis. Left hilar lymph node measures 18 x 16 mm. An enlarged right hilar lymph node measures 30 x 14 mm. Lungs/pleura: Lungs demonstrate diffuse ground-glass opacities. No significant pleural effusions. Central airways are patent. No bronchial wall thickening or bronchiectasis. No dominant or suspicious pulmonary nodules. Upper Abdomen: Limited images of the upper abdomen are unremarkable. Soft Tissues/Bones: No acute bone or soft tissue abnormality. 1. No pulmonary embolus. 2. Pulmonary opacities compatible with viral pneumonia. 3. Reactive mediastinal and hilar lymphadenopathy. XR ABDOMEN FOR NG/OG/NE TUBE PLACEMENT    Result Date: 11/8/2021  EXAMINATION: ONE SUPINE XRAY VIEW(S) OF THE ABDOMEN 11/8/2021 7:07 pm COMPARISON: 11/08/2021 chest x-ray HISTORY: ORDERING SYSTEM PROVIDED HISTORY: Confirmation of course of NG/OG/NE tube and location of tip of tube TECHNOLOGIST PROVIDED HISTORY: Reason for exam:->Confirmation of course of NG/OG/NE tube and location of tip of tube Portable? ->Yes Reason for Exam: Confirmation of course of NG/OG/NE tube and location of tip of tube Acuity: Acute Type of Exam: Initial Additional signs and symptoms: NA Relevant Medical/Surgical History: HYPERTENSION FINDINGS: Nasogastric tube tip is in the stomach with the side hole well past the GE junction. Endotracheal tube and right jugular central venous catheter again noted. Similar bilateral lung infiltrates. Nasogastric tube tip is in the stomach       All labs, medications and tests reviewed by myself including data  from outside source , patient and available family .   Continue all other

## 2021-11-12 NOTE — PROGRESS NOTES
During this time this nurse was preforming deep suctioning. When patients oxygen saturation was dropping rapidly. This nurse placed the patient vent settings to 100%. No change in patients oxygen levels. Dropping to the 70's. This nurse called for another nurse to assist. Patient was now in the 60's and still dropping. Notified unit respiratory therapist. At this time we began to manually bag the patient. The lowest oxygen reading was 43%. A rapid was called at [address] [facility]. Respiratory began lavaging the patient and peep was increased for the patient to recover. As well as a mucus plug removed. Patient peep back to 10 and oxygen percent remains at 100%.

## 2021-11-12 NOTE — CONSULTS
Comprehensive Nutrition Assessment    Type and Reason for Visit:  Reassess, consult    Nutrition Recommendations/Plan:     Will order Vital High Protein (Low calorie, high protein formula) with goal rate 50 mL/hr to provide 1200 mL total volume, 1200 kcal (2211 kcal with current propofol rate), 104 grams of protein and 1003 mL free water. Will monitor toleration of EN over the weekend and propofol rate. Will add protein modulars as needed on next assessment. Regimen as above will meet 100% estimated kcal needs and 71% estimated protein needs. Nutrition Assessment:  Pt with low oxygen saturations down to 72% on a vent. RD consulted for EN order and management. Pt continues at high nutrition risk. Malnutrition Assessment:  Malnutrition Status:  Severe malnutrition    Context:  Acute Illness     Findings of the 6 clinical characteristics of malnutrition:  Energy Intake:  7 - 50% or less of estimated energy requirements for 5 or more days  Weight Loss:  7 - Greater than 2% over 1 week     Body Fat Loss:  Unable to assess     Muscle Mass Loss:  Unable to assess    Fluid Accumulation:  No significant fluid accumulation Generalized   Strength:  Not Performed    Estimated Daily Nutrient Needs:  Energy (kcal):  4570 based on Haim stated 2010; Weight Used for Energy Requirements:  Current     Protein (g):  146+ (2+ g/kg IBW); Weight Used for Protein Requirements:  Ideal        Fluid (ml/day):  2200; Method Used for Fluid Requirements:  1 ml/kcal      Nutrition Related Findings:  Hypoactive bowel sounds, IVF at 150 mL/hr    Wounds:  None       Current Nutrition Therapies:    No diet orders on file  Current Tube Feeding (TF) Orders:  · Feeding Route: Orogastric    Additional Calorie Sources:   propofol running at 38.3 mL/hr provides 1011 kcal from lipid daily    Anthropometric Measures:  · Height: 5' 9.02\" (175.3 cm)  · Current Body Weight: 216 lb (98 kg)   · Weight significantly fluctuating.  [id number] during los  · Admission Body Weight: 242 lb (109.8 kg)    · Usual Body Weight: 254 lb (115.2 kg)     · Ideal Body Weight: 160 lbs; % Ideal Body Weight 135 %   · BMI: 31.9  · BMI Categories: Obese Class 1 (BMI 30.0-34. 9)       Nutrition Diagnosis:   · Severe malnutrition, In context of acute illness or injury related to catabolic illness, inadequate protein-energy intake, impaired respiratory function as evidenced by NPO or clear liquid status due to medical condition, weight loss      Nutrition Interventions:   Food and/or Nutrient Delivery:  Continue NPO, Start Tube Feeding  Nutrition Education/Counseling:  No recommendation at this time   Coordination of Nutrition Care:  Continue to monitor while inpatient    Goals:  Pt will tolerate nutrition support to meet needs while on vent       Nutrition Monitoring and Evaluation:   Behavioral-Environmental Outcomes:  None Identified   Food/Nutrient Intake Outcomes:  Supplement Intake, Food and Nutrient Intake  Physical Signs/Symptoms Outcomes:  Biochemical Data, Diarrhea, Hemodynamic Status, Fluid Status or Edema, Weight, Skin     Discharge Planning:     Too soon to determine     Electronically signed by Letitia Conner RD, LD on 11/12/21 at 9:07 AM EST    Contact: [phone]

## 2021-11-12 NOTE — PROGRESS NOTES
[address] [facility]  consulted by Dr. Layne Inman for monitoring and adjustment. Indication for treatment: Sepsis  Goal trough: 15 mcg/mL  AUC/SHANNON: 400-600    Pertinent Laboratory Values:   Temp Readings from Last 3 Encounters:   11/12/21 100.5 °F (38.1 °C) (Rectal)   12/15/15 98.1 °F (36.7 °C) (Temporal)     Recent Labs     11/10/21  0330 11/11/21  0520 11/12/21  0500   WBC 25.2* 13.5* 11.7*     Recent Labs     11/10/21  0330 11/11/21  0520 11/12/21  0500   BUN 30* 22 20   CREATININE 0.9 0.7* 0.6*     Estimated Creatinine Clearance: 147 mL/min (A) (based on SCr of 0.6 mg/dL (L)). Intake/Output Summary (Last 24 hours) at 11/12/2021 1511  Last data filed at 11/12/2021 1200  Gross per 24 hour   Intake 1597.92 ml   Output 3270 ml   Net -1672.08 ml       Pertinent Cultures:  Date    Source    Results  11/8   Blood    Ordered    Vancomycin level:   TROUGH:    Recent Labs     11/10/21  0330 11/12/21  0920   VANCOTROUGH 9.6* 7.8*     RANDOM:  No results for input(s): VANCORANDOM in the last 72 hours. Assessment:  · WBC and temperature: WBC @ 11.7;  Tmax = 101.6F  · SCr, BUN, and urine output:   · Scr trends improving, BUN WNL  · Day(s) of therapy: 5  · Vancomycin concentration:   · 11/10:  9.6, 18h post-dose  · 11/12: 7.8, 18h post-dose, sub-therapeutic (1500 q18h)    Plan:  · Increase vancomycin dosing to 1250 mg IVPB q12h with improving renal function  · Predicted trough: 10,   · Repeat level in 48h  · Pharmacy will continue to monitor patient and adjust therapy as indicated    Ramesh 3 11/14 @ 0600    Thank you for the consult,   Ezio Parry [facility], PharmD  11/12/2021 3:11 PM

## 2021-11-12 NOTE — SIGNIFICANT EVENT
Rapid response called for hypoxia  SUBJECTIVE:     Intubated, hypoxic into the 70s, with maximum ventilator settings    Patient seen and examined in 2012/2012-A.      OBJECTIVE:      VITALS  Vitals:    11/12/21 0152 11/12/21 0153 11/12/21 0154 11/12/21 0155   BP:       Pulse: 77 78 77 76   Resp: (!) 7 15 (!) 1 8   Temp:       TempSrc:       SpO2: 100% 100% 100% 100%   Weight:       Height:              PHYSICAL EXAM   GEN sedated, not oriented  HENT atraumatic/patent, intubated  RESP  ronchi respiratory distress  CARDIO/VASC RRR  GI Soft, non distended  SKIN warm        LABS  ABG  Lab Results   Component Value Date    PH 7.32 11/11/2021    O2SAT 94.6 11/11/2021         ASSESSMENT/PLAN:    Acute on chronic hypoxic respiratory failure likely secondary to mucous plug, after chest precautions and aggressive suctioning, patient is oxygen saturation improved, will repeat chest x-ray, will need follow-up    Mahsa Kemp MD  Internal Medicine Hospitalist  11/12/2021 2:15 AM    Due to the immediate potential for life-threatening deterioration due to acute hypoxic respiratory failure secondary to mucous plug, I spent 32 minutes providing critical care

## 2021-11-12 NOTE — PROGRESS NOTES
.        Hospitalist Progress Note      Name:  Christine Soto /Age/Sex: 1955  (58 y.o. male)   MRN & CSN:  [id number] & [id number] Admission Date/Time: 10/26/2021 11:34 PM   Location:  -A PCP: Clayton Gracia MD         Hospital Day: 18    Assessment and Plan:   Christine Soto is a 78 y.o.  male  who presents with <principal problem not specified>    1. Acute hypoxic respiratory failure: Due to COVID-19 pneumonia. On vent support. Pulmonary following.     2.  COVID-19 pneumonia: Continue dexamethasone and baricitinib. Empiric antibiotics     3. S/p Vfib arrest. Non sustained Vtach. Cardiology consult. Echocardiogram.     4. Hypotension: On pressors. Wean as tolerated. Empirically on iv antibiotics. Possible sepsis. Leukocytosis 23. 7.     5. Hyperlipidemia: Continue statin.     6. Hematuria. Urology following     DVT prophylaxis: Lovenox  Disposition:    Patient's wife updated. Diet Diet NPO  ADULT TUBE FEEDING; Orogastric; Peptide Based High Protein; Continuous; 20; Yes; 15; Q 4 hours; 50; 30; Q 4 hours   DVT Prophylaxis [] Lovenox, []  Heparin, [] SCDs, [] Ambulation   GI Prophylaxis [] PPI,  [] H2 Blocker,  [] Carafate,  [] Diet/Tube Feeds   Code Status Full Code   Disposition Patient requires continued admission due to    MDM [] Low, [] Moderate,[]  High  Patient's risk as above due to      History of Present Illness:     Chief Complaint: <principal problem not specified>  Christine Soto is a 78 y.o.  male  who presents with covid pneumonia. Patient intubated 2021 placed on vent support after he coded, was in PEA, ventricular fibrillation for which he was defibrillated. Had mucus plugging last night with desaturation. Improved with aggressive suctioning. Ten point ROS reviewed negative, unless as noted above    Objective:        Intake/Output Summary (Last 24 hours) at 2021 1113  Last data filed at 2021 0705  Gross per 24 hour   Intake 1597.92 ml   Output 2320 ml   Net -722.08 ml      Vitals:   Vitals:    11/12/21 1102   BP: (!) 98/53   Pulse: 85   Resp: 24   Temp:    SpO2: 93%     Physical Exam:   GEN Sedated male, no apparent distress. Appears given age. EYES Pupils are equally round. No scleral erythema, discharge, or conjunctivitis. HENT Mucous membranes are moist. Oral pharynx without exudates, no evidence of thrush. NECK Supple, no apparent thyromegaly or masses. RESP Diminished breath sounds, few rhonchi. Symmetric chest movement. CARDIO/VASC S1/S2 auscultated. Regular rate without appreciable murmurs, rubs, or gallops. No JVD or carotid bruits. Peripheral pulses equal bilaterally and palpable. No peripheral edema. GI Abdomen is soft without significant tenderness, masses, or guarding. Bowel sounds are normoactive. Rectal exam deferred. MSK No gross joint deformities. SKIN Normal coloration, warm, dry. NEURO Sedated.       Medications:   Medications:    furosemide  20 mg IntraVENous Once    lidocaine   Topical Once    dexamethasone  6 mg IntraVENous Daily    cefepime  2,000 mg IntraVENous Q8H    vancomycin  1,500 mg IntraVENous Q18H    albuterol sulfate HFA  4 puff Inhalation Q4H    ipratropium  4 puff Inhalation Q4H WA    tamsulosin  0.4 mg Oral Daily    atenolol  50 mg Oral BID    metoclopramide  10 mg IntraVENous Q8H    pantoprazole  40 mg IntraVENous Daily    losartan  100 mg Oral Daily    atorvastatin  10 mg Oral Daily    sodium chloride flush  5-40 mL IntraVENous 2 times per day    enoxaparin  30 mg SubCUTAneous BID      Infusions:    norepinephrine Stopped (11/11/21 1629)    propofol 65 mcg/kg/min (11/12/21 0848)    fentaNYL 100 mcg/hr (11/12/21 1042)    vasopressin (Septic Shock) infusion Stopped (11/08/21 1315)    sodium chloride       PRN Meds: sodium chloride flush, 5-40 mL, PRN  ALPRAZolam, 0.25 mg, Q8H PRN  sodium chloride, 25 mL, PRN  sodium chloride flush, 5-40 mL, PRN  ondansetron, 4 mg, Q8H PRN   Or  ondansetron, 4 mg, Q6H PRN  polyethylene glycol, 17 g, Daily PRN  acetaminophen, 650 mg, Q6H PRN   Or  acetaminophen, 650 mg, Q6H PRN            Electronically signed by Jim Arauz MD on 11/12/2021 at 11:13 AM

## 2021-11-12 NOTE — PROGRESS NOTES
Pulmonary and Critical Care  Progress Note      VITALS:  BP (!) 105/57   Pulse 76   Temp 100.5 °F (38.1 °C) (Rectal)   Resp 14   Ht 5' 9.02\" (1.753 m)   Wt 244 lb 14.9 oz (111.1 kg)   SpO2 94%   BMI 36.15 kg/m²     Subjective:   CHIEF COMPLAINT :SOB     HPI:                The patient is on the vent and sedated. His Fio2 is coming down slowly. He is responding to painful stimuli. Objective:   PHYSICAL EXAM:    LUNGS:Occasional basal crackles  Abd-soft, BS+,NT  Ext- no pedal edema  CVS-s1s2, no murmurs      DATA:    CBC:  Recent Labs     11/10/21  0330 11/11/21  0520 11/12/21  0500   WBC 25.2* 13.5* 11.7*   RBC 4.31* 3.72* 3.52*   HGB 13.7 11.7* 11.1*   HCT 40.9* 35.7* 34.7*    182 143   MCV 94.9 96.0 98.6   MCH 31.8* 31.5* 31.5*   MCHC 33.5 32.8 32.0   RDW 13.0 13.3 13.6   SEGSPCT 90.8* 88.9* 90.3*      BMP:  Recent Labs     11/10/21  0330 11/11/21  0520 11/12/21  0500    142 143   K 4.4 3.9 3.9    110 111*   CO2 24 23 25   BUN 30* 22 20   CREATININE 0.9 0.7* 0.6*   CALCIUM 9.0 8.3 8.3   GLUCOSE 129* 119* 85      ABG:  Recent Labs     11/10/21  0600 11/11/21  0600   PH 7.31* 7.32*   PO2ART 59* 83   VKB0RLL 48.0* 48.0*   O2SAT 89.7* 94.6*     BNP  No results found for: BNP   D-Dimer:  Lab Results   Component Value Date    DDIMER 837 (H) 10/29/2021      Radiology:   Multifocal opacities are redemonstrated. [place] apparent worsening of the right   lung may be due to decreased inflation     1. Assessment/Plan     Patient Active Problem List    Diagnosis Date Noted    Pneumonia due to COVID-19 virus 10/27/2021   s/p In hospital Cardiac arrest  VDRF  Acute Hypoxic resp failure- worsened  Bilateral Pneumonia sec to COVID-19  Obesity       1. Abx  2. Decadron  3. F/u C&S  4. Insulin  5. Inhalers  6. Prone ventilation as tolerated  7.  C/w present management    Electronically signed by Riley Reynolds MD on 11/12/2021 at 1:22 PM

## 2021-11-13 NOTE — PROGRESS NOTES
Pulmonary and Critical Care  Progress Note      VITALS:  BP (!) 98/55   Pulse 111   Temp 100 °F (37.8 °C) (Rectal)   Resp 20   Ht 5' 9.02\" (1.753 m)   Wt 245 lb (111.1 kg)   SpO2 95%   BMI 36.16 kg/m²     Subjective:   CHIEF COMPLAINT :SOB     HPI:                The patient is on the vent and sedated. He is still on 85% fio2. His CXR showed worsening    Objective:   PHYSICAL EXAM:    LUNGS:Occasional basal crackles  Abd-soft, BS+,NT  Ext- no pedal edema  CVS-s1s2, no murmurs      DATA:    CBC:  Recent Labs     11/11/21  0520 11/12/21  0500 11/13/21  0245   WBC 13.5* 11.7* 18.8*   RBC 3.72* 3.52* 3.74*   HGB 11.7* 11.1* 11.8*   HCT 35.7* 34.7* 37.5*    143 165   MCV 96.0 98.6 100.3*   MCH 31.5* 31.5* 31.6*   MCHC 32.8 32.0 31.5*   RDW 13.3 13.6 14.3   SEGSPCT 88.9* 90.3* 89.0*      BMP:  Recent Labs     11/11/21  0520 11/12/21  0500 11/13/21  0245    143 140   K 3.9 3.9 4.5    111* 106   CO2 23 25 22   BUN 22 20 27*   CREATININE 0.7* 0.6* 1.6*   CALCIUM 8.3 8.3 8.5   GLUCOSE 119* 85 131*      ABG:  Recent Labs     11/11/21  0600 11/13/21  0600   PH 7.32* 7.22*   PO2ART 83 122*   SLG8KML 48.0* 54.0*   O2SAT 94.6* 95.4*     BNP  No results found for: BNP   D-Dimer:  Lab Results   Component Value Date    DDIMER 837 (H) 10/29/2021      Radiology:   Interval worsening.  Severe diffuse infiltrates and/or congestion identified   in the lungs. 1.       Assessment/Plan     Patient Active Problem List    Diagnosis Date Noted    Pneumonia due to COVID-19 virus 10/27/2021   s/p In hospital Cardiac arrest  VDRF  Acute Hypoxic resp failure- worsened  Bilateral Pneumonia sec to COVID-19  Obesity  MARIA ELENA       1. Abx  2. F/u C&S  3. Decadron  4. Insulin  5. Keep sats > 88%  6. Inhalers  7. CPT  8. Prone ventilation as tolerated  9. CXR in  AM  10. Hold Diuretics  11. BMP in am  12. C/w present management  13. SAT and SBT trial when stable  12. C.w present management  13.  Reglan and then tube feeds restart    Electronically signed by Bella Chamberlain MD on 11/13/2021 at 11:39 AM

## 2021-11-13 NOTE — PROGRESS NOTES
.        Hospitalist Progress Note      Name:  Vidal Sarkar /Age/Sex: 1955  (80 y.o. male)   MRN & CSN:  [id number] & [id number] Admission Date/Time: 10/26/2021 11:34 PM   Location:  -A PCP: Jeovanny Merino MD         Hospital Day:     Assessment and Plan:   Vidal Sarkar is a 78 y.o.  male  who presents with <principal problem not specified>    1. Acute hypoxic respiratory failure: Due to COVID-19 pneumonia. On Vent support. Intubated 21. Pulmonary following.     2.  COVID-19 pneumonia: Continue dexamethasone and baricitinib. Empiric antibiotics     3. S/p Vfib arrest. Non sustained Vtach. Cardiology following. On atenolol. Echocardiogram essentially normal. EF estimated at 55%     4. Hypotension: On pressors. Wean as tolerated. Empirically on iv antibiotics. Possible sepsis.     5. Hyperlipidemia: Continue statin.     6. Hematuria. Urology following     DVT prophylaxis: Lovenox  Disposition:    Patient's wife updated. Diet Diet NPO  ADULT TUBE FEEDING; Orogastric; Peptide Based High Protein; Continuous; 20; Yes; 15; Q 4 hours; 50; 30; Q 4 hours   DVT Prophylaxis [] Lovenox, []  Heparin, [] SCDs, [] Ambulation   GI Prophylaxis [] PPI,  [] H2 Blocker,  [] Carafate,  [] Diet/Tube Feeds   Code Status Full Code   Disposition Patient requires continued admission due to    MDM [] Low, [] Moderate,[]  High  Patient's risk as above due to      History of Present Illness:     Chief Complaint: <principal problem not specified>  Vidal Sarkar is a 78 y.o.  male  who presents with covid pneumonia. Patient intubated 2021 placed on vent support after he coded, was in PEA, ventricular fibrillation for which he was defibrillated. Ten point ROS reviewed negative, unless as noted above    Objective:        Intake/Output Summary (Last 24 hours) at 2021 1402  Last data filed at 2021 1200  Gross per 24 hour   Intake 140 ml   Output 2505 ml   Net -2365 ml      Vitals: Vitals:    11/13/21 1236   BP:    Pulse: 95   Resp: 19   Temp:    SpO2: 95%     Physical Exam:   GEN Sedated male, no apparent distress. Appears given age. EYES Pupils are equally round. No scleral erythema, discharge, or conjunctivitis. HENT Mucous membranes are moist. Oral pharynx without exudates, no evidence of thrush. NECK Supple, no apparent thyromegaly or masses. RESP Diminished breath sounds, few rhonchi. Symmetric chest movement. CARDIO/VASC S1/S2 auscultated. Regular rate without appreciable murmurs, rubs, or gallops. No JVD or carotid bruits. Peripheral pulses equal bilaterally and palpable. No peripheral edema. GI Abdomen is soft without significant tenderness, masses, or guarding. Bowel sounds are normoactive. Rectal exam deferred. MSK No gross joint deformities. SKIN Normal coloration, warm, dry. NEURO Sedated.       Medications:   Medications:    vancomycin  1,250 mg IntraVENous Q12H    lidocaine   Topical Once    dexamethasone  6 mg IntraVENous Daily    cefepime  2,000 mg IntraVENous Q8H    ipratropium  4 puff Inhalation Q4H WA    tamsulosin  0.4 mg Oral Daily    atenolol  50 mg Oral BID    metoclopramide  10 mg IntraVENous Q8H    pantoprazole  40 mg IntraVENous Daily    losartan  100 mg Oral Daily    atorvastatin  10 mg Oral Daily    sodium chloride flush  5-40 mL IntraVENous 2 times per day    enoxaparin  30 mg SubCUTAneous BID      Infusions:    phenylephrine (KEIKO-SYNEPHRINE) 50mg/250mL infusion 200 mcg/min (11/13/21 1055)    vasopressin (Septic Shock) infusion 0.04 Units/min (11/13/21 0842)    norepinephrine Stopped (11/12/21 2335)    propofol 20 mcg/kg/min (11/13/21 1300)    fentaNYL 100 mcg/hr (11/13/21 0538)    sodium chloride       PRN Meds: magnesium sulfate, 1,000 mg, PRN  sodium chloride flush, 5-40 mL, PRN  ALPRAZolam, 0.25 mg, Q8H PRN  sodium chloride, 25 mL, PRN  sodium chloride flush, 5-40 mL, PRN  ondansetron, 4 mg, Q8H PRN   Or  ondansetron, 4 mg, Q6H

## 2021-11-13 NOTE — FLOWSHEET NOTE
Paged hospitalist several times re elevated HR and rhythm change to Afib with RVR, meds changed as per Emar. Called Dr. Carlos Breen twice and meds given per Emar. States he is ok with HR elevation as long as BP and O2 maintained.

## 2021-11-13 NOTE — PROGRESS NOTES
11/13/21 1236   Vent Information   Equipment Changed HME   Vent Type 840   Vent Mode AC/PC   Pressure Ordered 26   Rate Set 20 bmp   Peak Flow 50 L/min   FiO2  85 %   SpO2 95 %   SpO2/FiO2 ratio 111.76   PEEP/CPAP 14   Humidification Source HME   Vent Patient Data   Peak Inspiratory Pressure 41 cmH2O   Mean Airway Pressure 21 cmH20   Rate Measured 20 br/min   Vt Exhaled 482 mL   Minute Volume 9.6 Liters   I:E Ratio 1:2.8   Spontaneous Breathing Trial (SBT) RT Doc   Pulse 95   Additional Respiratory  Assessments   Resp 19   Position Semi-Conteh's   Alarm Settings   High Pressure Alarm 50 cmH2O   Delay Alarm 20 sec(s)   Low Minute Volume Alarm 2.5 L/min   Apnea (secs) 20 secs   High Respiratory Rate 40 br/min   Low Exhaled Vt  250 mL   ETT (adult)   Placement Date/Time: 11/08/21 1122   Preoxygenation: Yes  Mask Ventilation: Ventilated by mask (1)  Technique: Direct laryngoscopy  Type: Cuffed  Tube Size: 8 mm  Laryngoscope: Mac  Blade Size: 3  Location: Oral  Insertion attempts: 1  Placement Verif. ..    Secured at 25 cm   Measured From [address] [place] [address],[address] By Commercial tube culver   Site Condition Dry

## 2021-11-13 NOTE — PROGRESS NOTES
11/13/21 1704   Vent Information   Vent Type 840   Vent Mode AC/PC   Pressure Ordered 26   Rate Set 20 bmp   Peak Flow 50 L/min   FiO2  85 %   SpO2 96 %   SpO2/FiO2 ratio 112.94   Sensitivity 3   Humidification Source HME   Vent Patient Data   Peak Inspiratory Pressure 41 cmH2O   Mean Airway Pressure 25 cmH20   Rate Measured 23 br/min   Vt Exhaled 488 mL   Minute Volume 8.13 Liters   I:E Ratio 1:2.8   Plateau Pressure 35 HPL62   Static Compliance 11 mL/cmH2O   Total PEEP 15 cmH20   Auto PEEP 0.6 cmH20   Cough/Sputum   Sputum How Obtained Endotracheal; Suctioned   $Obtained Sample $Induced Sputum   Cough Productive   Frequency Infrequent   Sputum Amount Small   Sputum Color Bright red   Tenacity Thick   Spontaneous Breathing Trial (SBT) RT Doc   Pulse 92   Breath Sounds   Right Upper Lobe Diminished   Right Middle Lobe Diminished   Right Lower Lobe Diminished   Left Upper Lobe Diminished   Left Lower Lobe Diminished   Additional Respiratory  Assessments   Resp 20   Position Semi-Conteh's   Oral Care Mouth suctioned   Alarm Settings   High Pressure Alarm 50 cmH2O   Delay Alarm 20 sec(s)   Low Minute Volume Alarm 2.5 L/min   Apnea (secs) 20 secs   High Respiratory Rate 40 br/min   Low Exhaled Vt  250 mL   ETT (adult)   Placement Date/Time: 11/08/21 1122   Preoxygenation: Yes  Mask Ventilation: Ventilated by mask (1)  Technique: Direct laryngoscopy  Type: Cuffed  Tube Size: 8 mm  Laryngoscope: Mac  Blade Size: 3  Location: Oral  Insertion attempts: 1  Placement Verif. ..    Secured at 25 cm   Measured From Lips   ET Placement Right   Secured By Commercial tube culver   Site Condition Dry

## 2021-11-13 NOTE — ANESTHESIA PROCEDURE NOTES
Arterial Line:    An arterial line was placed using ultrasound guidance, in the ICU for the following indication(s): continuous blood pressure monitoring. A 20 gauge (size), 1 and 3/4 inch (length), Arrow (type) catheter was placed, Seldinger technique used, into the left radial artery, secured by tape and Tegaderm. Events:  patient tolerated procedure well with no complications and EBL 0mL.   11/13/2021 2:20 PM11/13/2021 2:25 PM  Resident/CRNA: KELLEY Glover - CRNA  Performed: Resident/CRNA   Preanesthetic Checklist  Completed: patient identified, IV checked, site marked, risks and benefits discussed, surgical consent, monitors and equipment checked, pre-op evaluation, timeout performed, anesthesia consent given, oxygen available and patient being monitored

## 2021-11-13 NOTE — PROGRESS NOTES
11/13/21 0503   Vent Information   Vent Type 840   Vent Mode AC/PC   Pressure Ordered 26   Rate Set 20 bmp   Peak Flow 41 L/min   SpO2 97 %   Sensitivity 3   PEEP/CPAP 14   I Time/ I Time % 0.8 s   Humidification Source HME   Circuit Condensation Drained   Nitric Oxide/Epoprostenol In Use? No   Vent Patient Data   Peak Inspiratory Pressure 41 cmH2O   Mean Airway Pressure 22 cmH20   Rate Measured 20 br/min   Vt Exhaled 381 mL   Minute Volume 7.61 Liters   I:E Ratio 1:2.8   Breath Sounds   Right Upper Lobe Diminished   Right Middle Lobe Diminished   Right Lower Lobe Diminished   Left Upper Lobe Diminished   Left Lower Lobe Diminished   Additional Respiratory  Assessments   Position Semi-Conteh's   Oral Care Completed? No   Alarm Settings   High Pressure Alarm 50 cmH2O   Delay Alarm 20 sec(s)   Low Minute Volume Alarm 2.5 L/min   Apnea (secs) 320 secs   High Respiratory Rate 40 br/min   Low Exhaled Vt  250 mL   Patient Observation   Observations Pt resting in bed   ETT (adult)   Placement Date/Time: 11/08/21 1122   Preoxygenation: Yes  Mask Ventilation: Ventilated by mask (1)  Technique: Direct laryngoscopy  Type: Cuffed  Tube Size: 8 mm  Laryngoscope: Mac  Blade Size: 3  Location: Oral  Insertion attempts: 1  Placement Verif. ..    Secured at 25 cm   Measured From [address] [place] [address],[address] By Commercial tube culver   Site Condition Dry

## 2021-11-13 NOTE — PROGRESS NOTES
11/13/21 0858   Vent Information   $Ventilation $Subsequent Day   Vent Type 840   Vent Mode AC/PC   Pressure Ordered 26   Rate Set 20 bmp   Peak Flow 50 L/min   FiO2  85 %   SpO2 96 %   SpO2/FiO2 ratio 112.94   Sensitivity 3   PEEP/CPAP 14   Humidification Source HME   Vent Patient Data   Peak Inspiratory Pressure 41 cmH2O   Mean Airway Pressure 21 cmH20   Rate Measured 20 br/min   Vt Exhaled 494 mL   Minute Volume 8.91 Liters   I:E Ratio 1:2.8   Plateau Pressure 15 HFS79   Static Compliance 39 mL/cmH2O   Total PEEP 14 cmH20   Auto PEEP 0 cmH20   Cough/Sputum   Sputum How Obtained Endotracheal; Suctioned   $Obtained Sample $Induced Sputum   Cough Non-productive   Frequency Infrequent   Sputum Amount None   Sputum Color None   Tenacity None   Spontaneous Breathing Trial (SBT) RT Doc   Pulse 94   Additional Respiratory  Assessments   Resp 20   Position Semi-Conteh's   Alarm Settings   High Pressure Alarm 50 cmH2O   Delay Alarm 20 sec(s)   Low Minute Volume Alarm 2.5 L/min   Apnea (secs) 20 secs   High Respiratory Rate 40 br/min   Low Exhaled Vt  250 mL

## 2021-11-14 NOTE — PROGRESS NOTES
Attempted to turn patient to the R side for repositioning. Pt did not tolerate and immediately dropped BP. Increased Usman per MAR and pt placed back in supine.      Electronically signed by Jony Garza RN on 11/14/2021 at 1:08 AM

## 2021-11-14 NOTE — PROGRESS NOTES
Message sent to hospitalist to confirm if atenolol needs to be given this PM. Notified that aware that there are parameters but pt is on 200 of Usman and 0.04 of Vaso. Waiting for response. Electronically signed by Alfredito Elias RN on 11/13/2021 at 8:08 PM    Responded to give atenolol due to patients erratic HR.      Electronically signed by Alfredito Elias RN on 11/13/2021 at 8:10 PM

## 2021-11-14 NOTE — PROGRESS NOTES
EXAMINATION:   ONE XRAY VIEW OF THE CHEST       11/14/2021 5:58 am       COMPARISON:   11/13/2021       HISTORY:   ORDERING SYSTEM PROVIDED HISTORY: ett placement   TECHNOLOGIST PROVIDED HISTORY:   Reason for exam:->ett placement   Reason for Exam: ett placement   Acuity: Unknown   Type of Exam: Unknown       FINDINGS:   Again seen is an endotracheal tube with the tip at the level of the   clavicular heads.  There is redemonstration of right IJ central venous   catheter an enteric tube, appearing grossly similar to the prior study.       The cardiac silhouette appears magnified.  There is redemonstration of   diffuse interstitial and airspace opacities, appearing grossly similar to the   prior study.  There appears to be trace left pleural effusion.  No evidence   of pneumothorax seen.           Impression   Redemonstration of diffuse bilateral interstitial and airspace opacities,   which may reflect multifocal pneumonia or pulmonary edema, appearing grossly   similar to the prior study.       Supporting devices as above, appearing grossly similar to the prior study.

## 2021-11-14 NOTE — PROGRESS NOTES
Pulmonary and Critical Care  Progress Note    Subjective: The patient is sedated on vent. Shortness of breath none  Chest pain none  Addressing respiratory complaints Patient is negative for  hemoptysis and cyanosis  CONSTITUTIONAL:  negative for fevers and chills      Past Medical History:     has a past medical history of Hyperlipidemia and Hypertension. has a past surgical history that includes Ankle surgery; knee surgery; and Hand surgery. reports that he has never smoked. He does not have any smokeless tobacco history on file. He reports current alcohol use. He reports that he does not use drugs. Family history:  family history is not on file. Allergies   Allergen Reactions    Amoxicillin      Social History:    Reviewed; no changes    Objective:   PHYSICAL EXAM:        VITALS:  /60   Pulse 81   Temp 99.1 °F (37.3 °C) (Rectal)   Resp 26   Ht 5' 9.02\" (1.753 m)   Wt 245 lb (111.1 kg)   SpO2 91%   BMI 36.16 kg/m²     24HR INTAKE/OUTPUT:      Intake/Output Summary (Last 24 hours) at 11/14/2021 1340  Last data filed at 11/14/2021 0523  Gross per 24 hour   Intake 3827.78 ml   Output 2175 ml   Net 1652.78 ml       CONSTITUTIONAL:  somnolent  LUNGS:  decreased breath sounds, basilar crackles. CARDIOVASCULAR:  normal S1 and S2 and negative JVD  ABD:Abdomen soft, non-tender. BS normal. No masses,  No organomegaly  NEURO:Sedated on vent.   DATA:    CBC:  Recent Labs     11/12/21  0500 11/13/21  0245 11/14/21  0541   WBC 11.7* 18.8* 16.2*   RBC 3.52* 3.74* 3.40*   HGB 11.1* 11.8* 10.7*   HCT 34.7* 37.5* 33.5*    165 171   MCV 98.6 100.3* 98.5   MCH 31.5* 31.6* 31.5*   MCHC 32.0 31.5* 31.9*   RDW 13.6 14.3 14.2   SEGSPCT 90.3* 89.0* 88.1*      BMP:  Recent Labs     11/12/21  0500 11/13/21  0245 11/14/21  0541    140 135   K 3.9 4.5 4.7   * 106 103   CO2 25 22 23   BUN 20 27* 40*   CREATININE 0.6* 1.6* 1.3   CALCIUM 8.3 8.5 8.6   GLUCOSE 85 131* 175*      ABG:  Recent Labs 11/13/21  0600 11/14/21  0600   PH 7.22* 7.15*   PO2ART 122* 151*   OZZ3PKZ 54.0* 65.0*   O2SAT 95.4* 96.3     Lab Results   Component Value Date    PROBNP 84.30 10/27/2021     No results found for: [facility]    Radiology Review:  Pertinent images / reports were reviewed as a part of this visit. Assessment:     Patient Active Problem List   Diagnosis    Pneumonia due to COVID-19 virus       Plan:   1. Overall the patient is unchanged  2. Inc. A/C rate, repeat ABGs. 3. Discussed with the RN and RT.     Roslyn Santos MD   11/14/2021  1:40 PM

## 2021-11-14 NOTE — PROGRESS NOTES
[address] [facility]  consulted by Dr. Ebony Drummond for monitoring and adjustment. Indication for treatment: Sepsis  Goal trough: 15 mcg/mL  AUC/SHANNON: 400-600    Pertinent Laboratory Values:   Temp Readings from Last 3 Encounters:   11/14/21 99.1 °F (37.3 °C) (Rectal)   12/15/15 98.1 °F (36.7 °C) (Temporal)     Recent Labs     11/12/21  0500 11/13/21  0245 11/14/21  0541   WBC 11.7* 18.8* 16.2*     Recent Labs     11/12/21  0500 11/13/21  0245 11/14/21  0541   BUN 20 27* 40*   CREATININE 0.6* 1.6* 1.3     Estimated Creatinine Clearance: 68 mL/min (based on SCr of 1.3 mg/dL).     Intake/Output Summary (Last 24 hours) at 11/14/2021 1524  Last data filed at 11/14/2021 0523  Gross per 24 hour   Intake 3827.78 ml   Output 2175 ml   Net 1652.78 ml       Pertinent Cultures:  Date    Source    Results  11/8   Blood    Ordered    Vancomycin level:   TROUGH:    Recent Labs     11/12/21  0920   VANCOTROUGH 7.8*     RANDOM:    Recent Labs     11/14/21  0541   VANCORANDOM 29.1       Assessment:  · WBC and temperature: WBC @ 16.2  · SCr, BUN, and urine output:   · Scr trends decreasing, BUN WNL  · Day(s) of therapy: 7  · Vancomycin concentration:   · 11/10:  9.6, 18h post-dose  · 11/12: 7.8, 18h post-dose, sub-therapeutic (1500 q18h)  · 11/14: 29.1= 7.5h pst dose, supra- Tx     Plan:  · Supra-tx level-  ·  thus temporarily hold/ dc the Vanco iv  · Obtain Vanco Trough in am  · Pharmacy will continue to monitor patient and adjust therapy as indicated    VANCOMYCIN CONCENTRATION SCHEDULED FOR 11/15 @ 0600    Thank you for the consult,   Constantino Oliva [facility]  11/14/2021 3:24 PM

## 2021-11-14 NOTE — PROGRESS NOTES
Lia Bustos MD, [address]                Internal Medicine Hospitalist             Daily Progress  Note   Subjective:     Chief Complaint   Patient presents with    Other     covid + low 02 at home symptoms started last thursday tested positive this weekend.  Shortness of Breath     Mr. Zulema Reid is in ICU intubated, off his tube feeds as well as not tolerating. Objective:    BP (!) 117/48   Pulse 78   Temp 99.1 °F (37.3 °C) (Rectal)   Resp 20   Ht 5' 9.02\" (1.753 m)   Wt 245 lb (111.1 kg)   SpO2 96%   BMI 36.16 kg/m²      Intake/Output Summary (Last 24 hours) at 11/14/2021 0725  Last data filed at 11/14/2021 0523  Gross per 24 hour   Intake 3827.78 ml   Output 3080 ml   Net 747.78 ml      Physical Exam:  Heart:  Regular rate and rhythm, normal S1 and S2 in all 4 auscultatory areas. No rubs  Murmurs or gallops heard. Lungs: Mostly clear to auscultation, decreased breath sounds at bases. No wheezes appreciated no crackles heard. Difficult to evaluate  Abdomen: Soft, non distended. Bowel sounds not appreciated. No obvious liver or spleen enlargement. Non tender, no rebound noted. Extremities: Non tender, no swelling noted, strength 5/5 both legs. CNS: Unable to evaluate.     Labs:  CBC with Differential:    Lab Results   Component Value Date    WBC 16.2 11/14/2021    RBC 3.40 11/14/2021    HGB 10.7 11/14/2021    HCT 33.5 11/14/2021     11/14/2021    MCV 98.5 11/14/2021    MCH 31.5 11/14/2021    MCHC 31.9 11/14/2021    RDW 14.2 11/14/2021    SEGSPCT 88.1 11/14/2021    BANDSPCT 4 11/04/2021    LYMPHOPCT 3.4 11/14/2021    MONOPCT 4.8 11/14/2021    MYELOPCT 1 11/03/2021    BASOPCT 0.4 11/14/2021    MONOSABS 0.8 11/14/2021    LYMPHSABS 0.6 11/14/2021    EOSABS 0.1 11/14/2021    BASOSABS 0.1 11/14/2021    DIFFTYPE AUTOMATED DIFFERENTIAL 11/14/2021     CMP:    Lab Results   Component Value Date     11/14/2021    K 4.7 11/14/2021     11/14/2021    CO2 23 11/14/2021    BUN 40 11/14/2021 CREATININE 1.3 11/14/2021    GFRAA >60 11/14/2021    LABGLOM 55 11/14/2021    GLUCOSE 175 11/14/2021    PROT 5.1 11/14/2021    LABALBU 3.0 11/14/2021    CALCIUM 8.6 11/14/2021    BILITOT 0.5 11/14/2021    ALKPHOS 77 11/14/2021    AST 29 11/14/2021    ALT 20 11/14/2021     No results for input(s): TROPONINT in the last 72 hours. No results found for: [facility]      phenylephrine (KEIKO-SYNEPHRINE) 50mg/250mL infusion 155 mcg/min (11/14/21 0629)    vasopressin (Septic Shock) infusion 0.04 Units/min (11/14/21 0303)    norepinephrine Stopped (11/12/21 8695)    propofol 20 mcg/kg/min (11/14/21 0540)    fentaNYL 100 mcg/hr (11/14/21 0115)    sodium chloride        vancomycin  1,250 mg IntraVENous Q12H    lidocaine   Topical Once    dexamethasone  6 mg IntraVENous Daily    cefepime  2,000 mg IntraVENous Q8H    ipratropium  4 puff Inhalation Q4H WA    tamsulosin  0.4 mg Oral Daily    atenolol  50 mg Oral BID    metoclopramide  10 mg IntraVENous Q8H    pantoprazole  40 mg IntraVENous Daily    losartan  100 mg Oral Daily    atorvastatin  10 mg Oral Daily    sodium chloride flush  5-40 mL IntraVENous 2 times per day    enoxaparin  30 mg SubCUTAneous BID         Assessment:       Patient Active Problem List    Diagnosis Date Noted    Pneumonia due to COVID-19 virus 10/27/2021       Plan:     Problems being addressed this admission:   Acute hypoxic respiratory failure: Due to COVID-19 pneumonia.    11/13/2021 -on Vent support. Intubated 11/8/21. Pulmonary following. COVID-19 pneumonia: Continue dexamethasone and baricitinib. Empiric antibiotics  11/14/2021-patient stays on vent. Also on Maxipime 2 g every 8 hours Decadron 6 mg daily and vancomycin every 12 hours. Further recommendations as per pulmonary. His tube feeds have been held due to an increased residual, I will get an x-ray of the abdomen rule out bowel obstruction. May need to be on TPN if needed. His ABG suggestive of respiratory acidosis.   May need to have his vent settings adjusted. His elevated white count could be secondary to demargination being on steroids      S/p Vfib arrest. Non sustained Vtach. 11/13/2021-Cardiology following. On atenolol. Echocardiogram essentially normal. EF estimated at 55%  11/14/2021-cardiology saw him few days ago wrote wide-complex tachycardia nonsustained probe probably in the setting of hypoxemia continue supportive care keep mag and phosphorus within normal range pressors as needed add small dose of beta-blocker if blood pressure allows will follow up as needed.     Hypotension:   11/13/2021- on pressors. Wean as tolerated. Empirically on iv antibiotics. Possible sepsis. 11/14/2021-his blood pressure 114/48 I will hold his Cozaar as already on pressors.     Hyperlipidemia:   11/13/2021-continue statin.     Hematuria. 11/13/2021- urology following    Consultants:  Pulmonary  Cardiology  Urology    General Orders:  Repeat basic labs again in am.  Discussed with RN by the bedside. The above chart was generated partly using Dragon dictation system, it may contain dictation errors given the limitations of this technology.      Guadalupe Abreu MD, [place]

## 2021-11-15 NOTE — PROGRESS NOTES
Pulmonary and Critical Care  Progress Note      VITALS:  BP (!) 131/51   Pulse 62   Temp 98.4 °F (36.9 °C) (Rectal)   Resp 28   Ht 5' 9.02\" (1.753 m)   Wt 242 lb 4.6 oz (109.9 kg)   SpO2 94%   BMI 35.76 kg/m²     Subjective:   CHIEF COMPLAINT :SOB     HPI:                The patient is on the vent and sedated. His Fio2 is coming down. He is responding to painful stimuli. Objective:   PHYSICAL EXAM:    LUNGS:Occasional basal crackles  Abd-soft, BS+,NT  Ext- no pedal edema  CVS-s1s2, no murmurs      DATA:    CBC:  Recent Labs     11/13/21  0245 11/14/21  0541 11/15/21  0509   WBC 18.8* 16.2* 9.8   RBC 3.74* 3.40* 3.12*   HGB 11.8* 10.7* 9.8*   HCT 37.5* 33.5* 29.8*    171 136*   .3* 98.5 95.5   MCH 31.6* 31.5* 31.4*   MCHC 31.5* 31.9* 32.9   RDW 14.3 14.2 13.9   SEGSPCT 89.0* 88.1* 88.3*      BMP:  Recent Labs     11/13/21  0245 11/14/21  0541 11/15/21  0509    135 132*   K 4.5 4.7 4.2    103 100   CO2 22 23 23   BUN 27* 40* 54*   CREATININE 1.6* 1.3 1.0   CALCIUM 8.5 8.6 8.6   GLUCOSE 131* 175* 171*      ABG:  Recent Labs     11/14/21  1345 11/14/21  1715 11/15/21  0800   PH 7.12* 7.16* 7.27*   PO2ART 119* 79 71*   GLL3YIL 79.0* 67.0* 59.0*   O2SAT 95.2* 93.3* 92.9*     BNP  No results found for: BNP   D-Dimer:  Lab Results   Component Value Date    DDIMER 837 (H) 10/29/2021      Radiology:   1. Stable lines, tubes, and support devices. 2. Unchanged patchy ground-glass opacities with small effusions     1. Assessment/Plan     Patient Active Problem List    Diagnosis Date Noted    Pneumonia due to COVID-19 virus 10/27/2021   s/p In hospital Cardiac arrest  VDRF  Acute Hypoxic resp failure- worsened  Bilateral Pneumonia sec to COVID-19  Obesity  MARIA ELENA- improved         1. Decadron  2. Insulin  3. Abx  4. F/u C&S  5. CPT  6. Tube feeds  7. Keep sats > 88%  8. Increase PEEP to decrease Fio2  9. SAT and SBT trial when stable  10.  C.w present management    Electronically signed by

## 2021-11-15 NOTE — PROGRESS NOTES
Comprehensive Nutrition Assessment    Type and Reason for Visit:  Reassess    Nutrition Recommendations/Plan:   · Recommend initiating trickle feeds (10 ml/hr) and closely monitor for signs of GI intolerance   · Please document BM  · Awaiting nutrition plan from attending physician  · Will monitor nutrition status, poc    Nutrition Assessment:  pt remains intubated, noted EN on hold d/t absent bowl sound in LUQ and RUQ, tinking in LLQ and RLQ per RN, per RN, abdomen round and soft, spoke with RN who agreeable to contact physician regarding nutrition plan, no BM documented in the past 7 days per flowsheet documentation, KUB on 11/14 showed no evidence for bowel obstruction, will continue to follow pt at high nutrition risk    Malnutrition Assessment:  Malnutrition Status:  Severe malnutrition    Context:  Acute Illness       Estimated Daily Nutrient Needs:  Energy (kcal):  0267 based on Haim stated 2010; Weight Used for Energy Requirements:  Current     Protein (g):  146+ (2+ g/kg IBW);  Weight Used for Protein Requirements:  Ideal        Fluid (ml/day):  2200; Method Used for Fluid Requirements:  1 ml/kcal      Nutrition Related Findings:  Meds: vasopressin, versed, fentanyl, reglan, protonix      Wounds:  None       Current Nutrition Therapies:    Diet NPO  ADULT TUBE FEEDING; Orogastric; Peptide Based; Continuous; 10; Yes; 10; Q 4 hours; 20; 30; Q 4 hours (not running at this time)    Anthropometric Measures:  · Height: 5' 9.02\" (175.3 cm)  · Current Body Weight: 242 lb 4.6 oz (109.9 kg)   · Admission Body Weight: 242 lb (109.8 kg)    · Usual Body Weight: 254 lb (115.2 kg)     · Ideal Body Weight: 160 lbs; % Ideal Body Weight 151.4 %   · BMI: 35.8   · BMI Categories: Obese Class 2 (BMI 35.0 -39.9)       Nutrition Diagnosis:   · Inadequate oral intake related to acute injury/trauma as evidenced by NPO or clear liquid status due to medical condition    Nutrition Interventions:   Food and/or Nutrient Delivery: Start Tube Feeding (Initiate trickle feeds and closely monitor for s/s of GI intolerance)  Nutrition Education/Counseling:  No recommendation at this time   Coordination of Nutrition Care:  Continue to monitor while inpatient    Goals:  Pt will tolerate nutrition support to meet needs while on vent       Nutrition Monitoring and Evaluation:   Behavioral-Environmental Outcomes:  None Identified   Food/Nutrient Intake Outcomes:  Enteral Nutrition Intake/Tolerance  Physical Signs/Symptoms Outcomes:  Biochemical Data, Hemodynamic Status, Fluid Status or Edema, GI Status, Weight, Skin     Discharge Planning:     Too soon to determine     Electronically signed by Joi Stanford MS, RD, LD on 11/15/21 at 1:11 PM EST    Contact: [phone]

## 2021-11-15 NOTE — PROGRESS NOTES
Received call from Damir Holder who was inquiring nutrition plan, Dr. Radha Painter updated on conversation and inquired about pending nutrition plan

## 2021-11-15 NOTE — PROGRESS NOTES
Hospitalist Progress Note      PCP: Aziza Yancey MD    Date of Admission: 10/26/2021    Chief Complaint on Admission: sob    Pt Seen/Examined and Chart Reviewed. Admitting dx acute resp failure, covid    SUBJECTIVE:     On vent, sedated, on vasopressin      OBJECTIVE:   Vitals    TEMPERATURE:  Current - Temp: 98.4 °F (36.9 °C); Max - Temp  Av.3 °F (36.8 °C)  Min: 97.9 °F (36.6 °C)  Max: 98.4 °F (36.9 °C)  RESPIRATIONS RANGE: Resp  Av.9  Min: 12  Max: 38  PULSE RANGE: Pulse  Av  Min: 38  Max: 70  BLOOD PRESSURE RANGE:  Systolic (48FPD), HVX:978 , Min:120 , ZDP:853   ; Diastolic (30GHE), GZV:13, Min:47, Max:93    PULSE OXIMETRY RANGE: SpO2  Av.5 %  Min: 90 %  Max: 96 %  24HR INTAKE/OUTPUT:    Intake/Output Summary (Last 24 hours) at 11/15/2021 1607  Last data filed at 11/15/2021 1500  Gross per 24 hour   Intake 2532. 95 ml   Output 1365 ml   Net 1167.95 ml       Exam:    General appearance: intubated and sedated  HEENT Normal cephalic, atraumatic without obvious deformity. Pupils equal, round, and reactive to light. Extra ocular muscles intact. Conjunctivae/corneas clear. Neck: Supple, No jugular venous distention/bruits. Trachea midline without thyromegaly or adenopathy with full range of motion. Lungs: bilateral coarse rales, on rhonchi  Heart: Regular rate and rhythm with Normal S1/S2 without  murmurs, rubs or gallops, point of maximum impulse non-displaced  Abdomen: Soft, non-tender or non-distended without rigidity or guarding and positive bowel sounds all four quadrants. Extremities: No clubbing, cyanosis, or edema bilaterally. Full range of motion without deformity   Skin: Skin color, texture, turgor normal. No rashes or lesions. Neurologic:  neurovascularly intact with sensory/motor intact upper extremities/lower extremities, bilaterally.  Cranial nerves:II-XII intact, grossly non-focal.  Mental status: sedated      Data    Recent Labs     21  0245 21  6154 11/15/21  0509   WBC 18.8* 16.2* 9.8   HGB 11.8* 10.7* 9.8*   HCT 37.5* 33.5* 29.8*    171 136*      Recent Labs     11/13/21  0245 11/14/21  0541 11/15/21  0509    135 132*   K 4.5 4.7 4.2    103 100   CO2 22 23 23   BUN 27* 40* 54*   CREATININE 1.6* 1.3 1.0     Recent Labs     11/13/21  0245 11/14/21  0541 11/15/21  0509   AST 28 29 19   ALT 19 20 16   BILITOT 0.7 0.5 0.5   ALKPHOS 88 77 69     No results for input(s): INR in the last 72 hours. No results for input(s): CKTOTAL, CKMB, CKMBINDEX, TROPONINI in the last 72 hours.     Imaging/Test Results    Hb 9.8  EF 55%        Consults:     IP CONSULT TO HOSPITALIST  IP CONSULT TO PULMONOLOGY  IP CONSULT TO PHARMACY  IP CONSULT TO PULMONOLOGY  PHARMACY TO DOSE VANCOMYCIN  IP CONSULT TO DIETITIAN  IP CONSULT TO CARDIOLOGY  IP CONSULT TO PALLIATIVE CARE    Allergies  Amoxicillin    Medications      Scheduled Meds:   vancomycin (VANCOCIN) intermittent dosing (placeholder)   Other RX Placeholder    lidocaine   Topical Once    dexamethasone  6 mg IntraVENous Daily    cefepime  2,000 mg IntraVENous Q8H    ipratropium  4 puff Inhalation Q4H WA    tamsulosin  0.4 mg Oral Daily    metoclopramide  10 mg IntraVENous Q8H    pantoprazole  40 mg IntraVENous Daily    atorvastatin  10 mg Oral Daily    sodium chloride flush  5-40 mL IntraVENous 2 times per day    enoxaparin  30 mg SubCUTAneous BID       Infusions:   midazolam 4 mg/hr (11/15/21 1043)    vasopressin (Septic Shock) infusion 0.04 Units/min (11/15/21 0711)    fentaNYL 125 mcg/hr (11/15/21 1041)    sodium chloride         PRN Meds:  magnesium sulfate, sodium chloride flush, ALPRAZolam, sodium chloride, sodium chloride flush, ondansetron **OR** ondansetron, polyethylene glycol, acetaminophen **OR** acetaminophen    ASSESSMENT AND PLAN      Active Hospital Problems    Diagnosis Date Noted    Pneumonia due to COVID-19 virus [U07.1, J12.82] 10/27/2021       Problems being addressed this admission:   Acute hypoxic respiratory failure: Due to COVID-19 pneumonia.    on Vent support. Intubated 11/8/21. Pulmonary following. COVID-19 pneumonia: Continue dexamethasone and baricitinib. Empiric antibiotics cefepime and vanc, tube feeds held d/t high residuals, no ileus or bowel obstruction, on 80% FIO2, wean oxygen as tolerated, pulm following, EF 55%, will restart TF at slow rate, CXR ordered today with Unchanged patchy ground-glass opacities with small effusions, 20 days since positive covid test, d/c isolation          S/p Vfib arrest. Non sustained Vtach. Cardio consulted,  wide-complex tachycardia nonsustained probe probably in the setting of hypoxemia continue supportive care keep mag and phosphorus within normal range pressors as needed add small dose of beta-blocker if blood pressure allows will follow up as needed.     Hypotension/septic shock  Wean as tolerated. Empirically on iv antibiotics. Possible sepsis. Off levo and phenylephrine, wean vasopressin as tolerated, holding BP meds, cx ngtd, day 8 of vanc/cefepime, will stop vanc today, plan to d/c cefepime tomorrow if stable       Hyperlipidemia:   continue statin.     Hematuria.    urology following    Anemia--h/h stable, check iron studies, no bleeding clinically    RIJ and a line and dalton in place     Consultants:  Pulmonary  Cardiology  Urology       PT/OT Eval Status:deferred    DVT Prophylaxis: SQ lovenox  Diet: ADULT TUBE FEEDING; Orogastric; Peptide Based; Continuous; 10; Yes; 10; Q 4 hours; 20; 30; Q 4 hours  Code Status: Full Code      Dispo - prolonged intubation, palliative consulted, restart TF, d/c vasopressin today, d/c vanc today, 8 days of vanc/cefepime, d/c cefepime tomorrow if stable    Mauro Baldwin MD

## 2021-11-15 NOTE — PLAN OF CARE
Problem: Pain:  Goal: Pain level will decrease  Description: Pain level will decrease  Outcome: Ongoing  Goal: Control of acute pain  Description: Control of acute pain  Outcome: Ongoing  Goal: Control of chronic pain  Description: Control of chronic pain  Outcome: Ongoing     Problem: Airway Clearance - Ineffective  Goal: Achieve or maintain patent airway  Outcome: Ongoing     Problem: Gas Exchange - Impaired  Goal: Absence of hypoxia  Outcome: Ongoing  Goal: Promote optimal lung function  Outcome: Ongoing     Problem: Breathing Pattern - Ineffective  Goal: Ability to achieve and maintain a regular respiratory rate  Outcome: Ongoing     Problem: Falls - Risk of:  Goal: Will remain free from falls  Description: Will remain free from falls  Outcome: Ongoing  Goal: Absence of physical injury  Description: Absence of physical injury  Outcome: Ongoing     Problem: Nutrition  Goal: Optimal nutrition therapy  Outcome: Ongoing     Problem: Skin Integrity:  Goal: Will show no infection signs and symptoms  Description: Will show no infection signs and symptoms  Outcome: Ongoing  Goal: Absence of new skin breakdown  Description: Absence of new skin breakdown  Outcome: Ongoing

## 2021-11-15 NOTE — PROGRESS NOTES
Pt has absent bowl sound in LUQ and RUQ, tinking in LLQ and RLQ. Per Dr. Al Rodriguez, hold tube feed.

## 2021-11-15 NOTE — PLAN OF CARE
Nutrition Problem #1: Inadequate oral intake  Intervention: Food and/or Nutrient Delivery: Start Tube Feeding (Initiate trickle feeds and closely monitor for s/s of GI intolerance)  Nutritional Goals: Pt will tolerate nutrition support to meet needs while on vent

## 2021-11-15 NOTE — PROGRESS NOTES
[address] [facility]  consulted by Dr. Layne Inman for monitoring and adjustment. Indication for treatment: Sepsis  Goal trough: 15 mcg/mL  AUC/SHANNON: 400-600    Pertinent Laboratory Values:   Temp Readings from Last 3 Encounters:   11/15/21 98.4 °F (36.9 °C) (Rectal)   12/15/15 98.1 °F (36.7 °C) (Temporal)     Recent Labs     11/13/21  0245 11/14/21  0541 11/15/21  0509 11/15/21  1010   WBC 18.8* 16.2* 9.8  --    LACTATE  --   --   --  1.0     Recent Labs     11/13/21  0245 11/14/21  0541 11/15/21  0509   BUN 27* 40* 54*   CREATININE 1.6* 1.3 1.0     Estimated Creatinine Clearance: 88 mL/min (based on SCr of 1 mg/dL). Intake/Output Summary (Last 24 hours) at 11/15/2021 1301  Last data filed at 11/15/2021 3910  Gross per 24 hour   Intake 2502.95 ml   Output 2365 ml   Net 137.95 ml       Pertinent Cultures:  Date    Source    Results  11/8   Blood    Ordered    Vancomycin level:   TROUGH:    No results for input(s): VANCOTROUGH in the last 72 hours.   RANDOM:    Recent Labs     11/14/21  0541 11/15/21  0509   VANCORANDOM 29.1 22.8       Assessment:  · WBC and temperature: WBC WNL;   · SCr, BUN, and urine output:   · Previously MARIA ELENA, then renal trends improved, now in MARIA ELENA again  · Day(s) of therapy: 8  · Vancomycin concentration:   · 11/10:  9.6, 18h post-dose  · 11/12: 7.8, 18h post-dose, sub-therapeutic (1500 q18h)  · 11/15: 22.8. 18h post-dose, above goal    Plan:  · Dosing adjusted from scheduled to intermittent based on MARIA ELENA  · Based on level, hold additional vancomycin  · Repeat next random level tomorrow AM  · Pharmacy will continue to monitor patient and adjust therapy as indicated    Ramesh 3 11/16 @ 0600    Thank you for the consult,   Ezio Parry, [facility], PharmD  11/15/2021 1:01 PM

## 2021-11-15 NOTE — PROGRESS NOTES
Today's plan:  [facility]      Admit Date:  10/26/2021    Subjective:OK      Chief complaints on admission  Chief Complaint   Patient presents with    Other     covid + low 02 at home symptoms started last thursday tested positive this weekend.  Shortness of Breath         History of present illness:Cecilio is a 78 y. o.year old who  presents with had concerns including Other (covid + low 02 at home symptoms started last thursday tested positive this weekend.) and Shortness of Breath. Past medical history:    has a past medical history of Hyperlipidemia and Hypertension. Past surgical history:   has a past surgical history that includes Ankle surgery; knee surgery; and Hand surgery. Social History:   reports that he has never smoked. He does not have any smokeless tobacco history on file. He reports current alcohol use. He reports that he does not use drugs. Family history:  family history is not on file. Allergies   Allergen Reactions    Amoxicillin          Objective:   BP (!) 131/51   Pulse 62   Temp 98.4 °F (36.9 °C) (Rectal)   Resp 28   Ht 5' 9.02\" (1.753 m)   Wt 242 lb 4.6 oz (109.9 kg)   SpO2 94%   BMI 35.76 kg/m²       Intake/Output Summary (Last 24 hours) at 11/15/2021 1025  Last data filed at 11/15/2021 2016  Gross per 24 hour   Intake 2502.95 ml   Output 2365 ml   Net 137.95 ml       TELEMETRY:SINUS  Physical Exam:  Constitutional:  Well developed, Well nourished, No acute distress, Non-toxic appearance. HENT:  Normocephalic, Atraumatic, Bilateral external ears normal, Oropharynx moist, No oral exudates, Nose normal. Neck- Normal range of motion, No tenderness, Supple, No stridor. Eyes:  PERRL, EOMI, Conjunctiva normal, No discharge. Respiratory:  Normal breath sounds, No respiratory distress, No wheezing, No chest tenderness. ,no use of accessory muscles, diaphragm movement is normal  Cardiovascular: (PMI) apex non displaced,no lifts no thrills, no s3,no s4, Normal heart rate, Normal rhythm, No murmurs, No rubs, No gallops. Carotid arteries pulse and amplitude are normal no bruit, no abdominal bruit noted ( normal abdominal aorta ausculation), femoral arteries pulse and amplitude are normal no bruit, pedal pulses are normal  GI:  Bowel sounds normal, Soft, No tenderness, No masses, No pulsatile masses. : External genitalia appear normal, No masses or lesions. No discharge. No CVA tenderness. Musculoskeletal:  Intact distal pulses, No edema, No tenderness, No cyanosis, No clubbing. Good range of motion in all major joints. No tenderness to palpation or major deformities noted. Back- No tenderness. Integument:  Warm, Dry, No erythema, No rash. Lymphatic:  No lymphadenopathy noted.    Neurologic:  INTUBATED     Medications:    vancomycin (VANCOCIN) intermittent dosing (placeholder)   Other RX Placeholder    lidocaine   Topical Once    dexamethasone  6 mg IntraVENous Daily    cefepime  2,000 mg IntraVENous Q8H    ipratropium  4 puff Inhalation Q4H WA    tamsulosin  0.4 mg Oral Daily    metoclopramide  10 mg IntraVENous Q8H    pantoprazole  40 mg IntraVENous Daily    [Held by provider] losartan  100 mg Oral Daily    atorvastatin  10 mg Oral Daily    sodium chloride flush  5-40 mL IntraVENous 2 times per day    enoxaparin  30 mg SubCUTAneous BID      midazolam 3 mg/hr (11/15/21 0545)    phenylephrine (KEIKO-SYNEPHRINE) 50mg/250mL infusion Stopped (11/15/21 0956)    vasopressin (Septic Shock) infusion 0.04 Units/min (11/15/21 0711)    norepinephrine Stopped (11/12/21 2335)    fentaNYL 100 mcg/hr (11/15/21 0822)    sodium chloride       magnesium sulfate, sodium chloride flush, ALPRAZolam, sodium chloride, sodium chloride flush, ondansetron **OR** ondansetron, polyethylene glycol, acetaminophen **OR** acetaminophen    Lab Data:  CBC:   Recent Labs     11/13/21  0245 11/14/21  0541 11/15/21  0509   WBC 18.8* 16.2* 9.8   HGB 11.8* 10.7* 9.8*   HCT 37.5* 33.5* 29.8*   MCV 100.3* 98.5 95.5    171 136*     BMP:   Recent Labs     11/13/21  0245 11/14/21  0541 11/15/21  0509    135 132*   K 4.5 4.7 4.2    103 100   CO2 22 23 23   BUN 27* 40* 54*   CREATININE 1.6* 1.3 1.0     LIVER PROFILE:   Recent Labs     11/13/21  0245 11/14/21  0541 11/15/21  0509   AST 28 29 19   ALT 19 20 16   BILITOT 0.7 0.5 0.5   ALKPHOS 88 77 69     PT/INR: No results for input(s): PROTIME, INR in the last 72 hours. APTT: No results for input(s): APTT in the last 72 hours. BNP:  No results for input(s): BNP in the last 72 hours. TROPONIN: @TROPONINI:3@      Assessment:  78 y. o.year old who is admitted for          Plan:ADMITTED TO ICU  Covid 19 PNEUMONIA with respiratory failure intubated, and sedated,   Bradycardia\" better since propofol changed to versed  Shock\" on pressors  NSVT; resolved  All labs, medications and tests reviewed, continue all other medications of all above medical condition listed as is.       Hernandez Santiago MD, MD 11/15/2021 10:25 AM

## 2021-11-16 PROBLEM — I46.9 CARDIAC ARREST WITH VENTRICULAR FIBRILLATION (HCC): Status: ACTIVE | Noted: 2021-01-01

## 2021-11-16 PROBLEM — A41.9 SEPTIC SHOCK (HCC): Status: ACTIVE | Noted: 2021-01-01

## 2021-11-16 PROBLEM — R31.9 HEMATURIA: Status: ACTIVE | Noted: 2021-01-01

## 2021-11-16 PROBLEM — R65.21 SEPTIC SHOCK (HCC): Status: ACTIVE | Noted: 2021-01-01

## 2021-11-16 PROBLEM — I49.01 CARDIAC ARREST WITH VENTRICULAR FIBRILLATION (HCC): Status: ACTIVE | Noted: 2021-01-01

## 2021-11-16 PROBLEM — J96.01 ACUTE RESPIRATORY FAILURE WITH HYPOXIA (HCC): Status: ACTIVE | Noted: 2021-01-01

## 2021-11-16 NOTE — FLOWSHEET NOTE
Wife Adenike Anderson called and notified per Nirav Cisneros RN nurse manager, patient is out of isolation (not ordered and not flagged in chart) therefore patient's wife only may visit today for one hour only.  No other visitors will be permitted at this time     Cadence Lai RN 10:39 AM

## 2021-11-16 NOTE — PLAN OF CARE
Problem: Pain:  Description: Pain management should include both nonpharmacologic and pharmacologic interventions.   Goal: Pain level will decrease  Description: Pain level will decrease  11/16/2021 1326 by Sylvie Kaba RN  Outcome: Ongoing  11/16/2021 0213 by Stephany Anderson RN  Outcome: Met This Shift  Goal: Control of acute pain  Description: Control of acute pain  11/16/2021 1326 by Sylvie Kaba RN  Outcome: Ongoing  11/16/2021 0213 by Stephany Anderson RN  Outcome: Met This Shift  Goal: Control of chronic pain  Description: Control of chronic pain  11/16/2021 1326 by Sylvie Kaba RN  Outcome: Ongoing  11/16/2021 0213 by Stephany Anderson RN  Outcome: Met This Shift  Goal: Patient's pain/discomfort is manageable  Description: Patient's pain/discomfort is manageable  Outcome: Ongoing     Problem: Airway Clearance - Ineffective  Goal: Achieve or maintain patent airway  11/16/2021 1326 by Sylvie Kaba RN  Outcome: Ongoing  11/16/2021 0213 by Stephany Anderson RN  Outcome: Met This Shift     Problem: Gas Exchange - Impaired  Goal: Absence of hypoxia  11/16/2021 1326 by Sylvie Kaba RN  Outcome: Ongoing  11/16/2021 0213 by Stephany Anderson RN  Outcome: Ongoing  Goal: Promote optimal lung function  11/16/2021 1326 by Sylvie Kaab RN  Outcome: Ongoing  11/16/2021 0213 by Stephany Anderson RN  Outcome: Ongoing     Problem: Breathing Pattern - Ineffective  Goal: Ability to achieve and maintain a regular respiratory rate  11/16/2021 1326 by Sylvie Kaba RN  Outcome: Ongoing  11/16/2021 0213 by Stephany Anderson RN  Outcome: Ongoing     Problem: Falls - Risk of:  Goal: Will remain free from falls  Description: Will remain free from falls  11/16/2021 1326 by Sylvie Kaba RN  Outcome: Ongoing  11/16/2021 0213 by Stephany Anderson RN  Outcome: Met This Shift  Goal: Absence of physical injury  Description: Absence of physical injury  11/16/2021 1326 by Sylvie Kaba RN  Outcome: Ongoing  11/16/2021 0213 by Stephany Anderson RN  Outcome: Met This Shift

## 2021-11-16 NOTE — PLAN OF CARE
Nutrition Problem #1: Inadequate oral intake  Intervention: Food and/or Nutrient Delivery: Modify Tube Feeding  Nutritional Goals: Pt will meet greater than 75% of estimated nutrient needs via EN

## 2021-11-16 NOTE — FLOWSHEET NOTE
Dr Domonique Green notified of patient's wife request for a physician to sign some official leave paperwork as to why patient is off work. Paperwork left on counter in room.  Wife also updated via phone, security code verified     Lovely Hughes RN 6:38 PM

## 2021-11-16 NOTE — FLOWSHEET NOTE
Branden Jeffers RT notified of new order to increase PEEP to 12 and decrease FIO2 to 60 per Dr Curt Oleary order     Fernanda Joseph, RN 12:11 PM

## 2021-11-16 NOTE — PROGRESS NOTES
Pulmonary and Critical Care  Progress Note      VITALS:  BP (!) 117/55   Pulse 83   Temp 98.7 °F (37.1 °C) (Rectal)   Resp 15   Ht 5' 9.02\" (1.753 m)   Wt 245 lb 6 oz (111.3 kg)   SpO2 92%   BMI 36.22 kg/m²     Subjective:   CHIEF COMPLAINT :SOB     HPI:                The patient is on the vent and sedated. He is still on 75% fio2. He is responding to painful stimuli. Objective:   PHYSICAL EXAM:    LUNGS:Occasional basal crackles  Abd-soft, BS+,NT  Ext- no pedal edema  CVS-s1s2, no murmurs      DATA:    CBC:  Recent Labs     11/14/21  0541 11/15/21  0509 11/16/21  0520   WBC 16.2* 9.8 7.0   RBC 3.40* 3.12* 2.99*   HGB 10.7* 9.8* 9.6*   HCT 33.5* 29.8* 28.8*    136* 117*   MCV 98.5 95.5 96.3   MCH 31.5* 31.4* 32.1*   MCHC 31.9* 32.9 33.3   RDW 14.2 13.9 13.9   SEGSPCT 88.1* 88.3* 79.4*      BMP:  Recent Labs     11/14/21  0541 11/15/21  0509 11/16/21  0520    132* 138   K 4.7 4.2 4.0    100 104   CO2 23 23 26   BUN 40* 54* 61*   CREATININE 1.3 1.0 0.9   CALCIUM 8.6 8.6 9.1   GLUCOSE 175* 171* 135*      ABG:  Recent Labs     11/14/21  1345 11/14/21  1715 11/15/21  0800   PH 7.12* 7.16* 7.27*   PO2ART 119* 79 71*   TZB3SUD 79.0* 67.0* 59.0*   O2SAT 95.2* 93.3* 92.9*     BNP  No results found for: BNP   D-Dimer:  Lab Results   Component Value Date    DDIMER 837 (H) 10/29/2021      1. Radiology: None      Assessment/Plan     Patient Active Problem List    Diagnosis Date Noted    Pneumonia due to COVID-19 virus 10/27/2021   s/p In hospital Cardiac arrest  VDRF  Acute Hypoxic resp failure- worsened  Bilateral Pneumonia sec to COVID-19  Obesity  MARIA ELENA- improved       1. Decadron  2. Insulin  3. Inhalers  4. CPT  5. Tube feeds  6. Keep sats > 88%  7. Increase PEEP to 12 cm h20 and decrease Fio2 to 60%   8. Daily SAT and SBT trials  9. PT/OT  10. CXR in am  6.  C/w present management    Electronically signed by Jagdish Arrington MD on 11/16/2021 at 9:54 AM

## 2021-11-16 NOTE — FLOWSHEET NOTE
Dr Romayne Kitchen notified of ABG results. Also notified of excessive oral/ETT sections thick, yellow, foul smelling. Dr Sumit Marques notified of high TF residuals, greater than 100 ml this morning. Verbal orders received to hold TF today, will re-evaluate tomorrow. Place OG back to LIWS. Sylvie Kaba RN 12:14 PM     Dr Axel Hernandez via perfect serve and notified of suggestion to consult ID per Dr Romayne Kitchen if appropriate at this time.      Sylvie Kaba RN 12:20 PM

## 2021-11-16 NOTE — PROGRESS NOTES
During handoff noted pt coughing more on vent, message sent to Angel Bauer NP due to MAP less than 60. Informed of night shift's name and number.

## 2021-11-16 NOTE — CONSULTS
Infectious Disease Consult Note  2021   Patient Name: Tina Diaz : 1955   Impression  Critical COVID-19 pneumonia with acute hypoxic respiratory failure. Started vaccine series on 10/17/2021. Received 1 dose of Moderna COVID vaccine  Day 26 of disease, may have had a bacterial superinfection, however this has resolved. It is also unclear if prior Moderna vaccine may have helped in some way. At present, will need to rule out hyper inflammatory reaction versus secondary fungal infection  Date of symptom onset: 10/21/2021  Date of positive SARS-CoV-2 NAAT/PCR: 10/26/2021  Exposure: Unknown  Oxygen supplementation: Mechanical ventilator  Bacterial infection: Possible  BMI: 36.22 kg/m²  COVID-associated Coagulopathy  Elevated D-dimer   Multi-morbidity: per PMHx hypertension, hyperlipidemia  Plan:   Therapeutic: Continue vancomycin and cefepime.  Diagnostic: Check CRP, procalcitonin, beta-1 3D glucan, Aspergillus antigen, 2 sets of blood cultures, follow-up respiratory culture, IgA, IgM, IgG   F/u test:     Thank you for allowing me to consult in the care of this patient.  ------------------------  REASON FOR CONSULT: Infective syndrome   Requested by: Dr. Anup Lyn  History obtained from chart review, RN Lynnwood Dance as the patient is intubated  HPI:Patient is a 78 y.o.  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 He was diagnosed with severe COVID-19 pneumonia, after testing positive for COVID-19. CTA of the chest showed no pulmonary embolus, pulmonary opacities compatible with viral pneumonia, reactive mediastinal and hilar lymphadenopathy. Was treated with intravenous dexamethasone.   Vancomycin and cefepime was started on  possibly in response to leukocytosis of 23.7. Leukocytosis resolved on 11/15/2021. Patient had a fever spike on 11/16 of 100.4 °F.  It is reported that he continues to have foul-smelling endotracheal secretions. ?  Infectious diseases service was consulted to evaluate the pt, and recommend further investigative and therapeutic measures. Review and summary of old records:  ROS: Other systems reviewed Including eyes, ENT, respiratory, cardiovascular, GI, , dermatologic, neurologic, psych, hem/lymphatic, musculoskeletal and endocrine were negative other than what is mentioned above. Unable to obtain; pt on vent  Patient Active Problem List    Diagnosis Date Noted    Acute respiratory failure with hypoxia ([facility] [address] 11/16/2021    Cardiac arrest with ventricular fibrillation ([facility] [address] 11/16/2021    Septic shock ([facility] [address] 11/16/2021    Hematuria 11/16/2021    Pneumonia due to COVID-19 virus 10/27/2021     Past Medical History:   Diagnosis Date    Hyperlipidemia     Hypertension       Past Surgical History:   Procedure Laterality Date    ANKLE SURGERY      HAND SURGERY      KNEE SURGERY        History reviewed. No pertinent family history. Infectious disease related family history - not contibutory. SOCIAL HISTORY  Social History     Tobacco Use    Smoking status: Never Smoker    Smokeless tobacco: Not on file   Substance Use Topics    Alcohol use: Yes     Comment: occasional       Born:  [address] Occupation:   No recent travel of significance.  No recent unusual exposures.  NO pets    ? ALLERGIES  Allergies   Allergen Reactions    Amoxicillin       MEDICATIONS  Reviewed and are per the chart/EMR. IMMUNIZATION HISTORY  Immunization History   Administered Date(s) Administered    COVID-19, Juhi Estimable, Primary or Immunocompromised, PF, 100mcg/0.5mL 10/14/2021     ?   Antibiotics:   Vancomycin  Cefepime  ?  -------------------------------------------------------------------------------------------------------------------    Vital Signs:  Vitals:    11/16/21 1318   BP:    Pulse: 87   Resp: 23   Temp:    SpO2: 92%         Exam:    VS: noted; wt 111.3 kg  Gen: Intubated and on mechanical ventilation  Skin: no stigmata of endocarditis  Wounds: C/D/I  HEMT: AT/NC ETT and OGT  Eyes: PERRLA, EOMI, conjunctiva pink, sclera anicteric. Neck: Supple. Trachea midline. No LAD. Chest: Transmitted breath sounds  Heart: RRR and no MRG. Abd: soft, non-distended, no tenderness, no hepatomegaly. Normoactive bowel sounds. Ext: no clubbing, cyanosis, or edema  Catheter Site: without erythema or tenderness  LDA: CVC: Right internal jugular; urethral catheter placed on 11/8/2021  Neuro: Intubated on mechanical ventilation    ? Diagnostic Studies: reviewed  ? ? I have examined this patient and available medical records on this date and have made the above observations, conclusions and recommendations.   Electronically signed by: Electronically signed by Dannielle Yepez MD on 11/16/2021 at 2:58 PM

## 2021-11-16 NOTE — PROGRESS NOTES
Hospitalist Progress Note      PCP: Arjun Naidu MD    Date of Admission: 10/26/2021    LOS: 20      Case Summary:   58-year-old gentleman with a history of hypertension, hyperlipidemia admitted with acute respiratory failure and hypoxia due to COVID-19 on 10/26/2021. Admission course was complicated by V. fib arrest on 11/8/2021 with ROSC. He also had retained Gómez catheter with hematuria and was consulted by urology. CT abdomen pelvis noted Gómez catheter within the penile urethra with balloon inflated. Balloon was punctured with a needle and Gómez removed      Active Hospital Problems    Diagnosis Date Noted    Acute respiratory failure with hypoxia ([address] [J96.01] 11/16/2021    Cardiac arrest with ventricular fibrillation ([address] [I46.9, I49.01] 11/16/2021    Septic shock ([facility]) [A41.9, R65.21] 11/16/2021    Hematuria [R31.9] 11/16/2021    Pneumonia due to COVID-19 virus [U07.1, J12.82] 10/27/2021         Principal Problem:    Pneumonia due to COVID-19 virus and Acute respiratory failure with hypoxia: Patient intubated 11/8/2021 and remains on vent support.  -Continue dexamethasone  -Completed baricitinib  -Continue empiric antibiotics with cefepime. Vancomycin discontinued. Septic shock: In the setting of Covid infection. Remains on pressor support and will titrate accordingly and wean off as tolerated. Target MAP greater than 65      Tube feeds: With high residuals. No ileus or bowel obstruction. Continue with Reglan    Active Problems:    Cardiac arrest with ventricular fibrillation ([address]): With nonsustained V. tach. Cardiology was consulted. Wide-complex tachycardia nonsustained probably in the setting of hypoxemia.  -Continue supportive care  -Keep magnesium greater than 2 and potassium greater than 4 and phosphorus within normal limits  -As needed dose of beta-blocker      Hematuria: In the setting retained Gómez in the urethra with balloon inflated.   Urology was consulted and deflated the catheter. Urology following with recommendation. Hyperlipidemia: on statin      Medications:  Reviewed  Infusion Medications    vasopressin (Septic Shock) infusion 0.03 Units/min (11/16/21 1112)    midazolam 7 mg/hr (11/16/21 0842)    fentaNYL 150 mcg/hr (11/16/21 1104)    sodium chloride       Scheduled Medications    lidocaine   Topical Once    dexamethasone  6 mg IntraVENous Daily    cefepime  2,000 mg IntraVENous Q8H    ipratropium  4 puff Inhalation Q4H WA    tamsulosin  0.4 mg Oral Daily    metoclopramide  10 mg IntraVENous Q8H    pantoprazole  40 mg IntraVENous Daily    atorvastatin  10 mg Oral Daily    sodium chloride flush  5-40 mL IntraVENous 2 times per day    enoxaparin  30 mg SubCUTAneous BID     PRN Meds: magnesium sulfate, sodium chloride flush, ALPRAZolam, sodium chloride, sodium chloride flush, ondansetron **OR** ondansetron, polyethylene glycol, acetaminophen **OR** acetaminophen      DVT Prophylaxis: Subcut enoxaparin  Diet: ADULT TUBE FEEDING; Orogastric; Peptide Based; Continuous; 10; Yes; 10; Q 4 hours; 30; 30; Q 4 hours; Protein; Proteinex TID  Code Status: Full Code    Dispo: Elian critically intubated in ICU  Pt has a high probability of imminent or life-threatening deterioration requiring close monitoring, and highly complex decision-making and/or interventions of high intensity to assess, manipulate, and support his critical organ systems to prevent a likely inevitable decline which could occur if left untreated. 33 minutes of critical care time spent.      ____________________________________________________________________________    Subjective:   Overnight Events:   Uneventful overnight        Physical Exam:  BP (!) 150/59   Pulse 89   Temp 98.7 °F (37.1 °C) (Rectal)   Resp 24   Ht 5' 9.02\" (1.753 m)   Wt 245 lb 6 oz (111.3 kg)   SpO2 95%   BMI 36.22 kg/m²   General appearance: No apparent distress, appears stated age and cooperative.   Intubated sedated on the vent  HEENT: Normocephalic, atraumatic, MMM, No sclera icterus/conjuctival palor  Neck: Supple, no thyromegally. No jugular venous distention. Respiratory:  Normal respiratory effort. Clear to auscultation on the vent  Cardiovascular: S1/S2 without murmurs, rubs or gallops. RRR  Abdomen: Soft, non-tender, non-distended, bowel sounds present. Musculoskeletal: No clubbing, cyanosis or edema bilaterally. Skin: Skin color, texture, turgor normal.  No rashes or lesions. Neurologic: Remains intubated and sedated      Intake/Output Summary (Last 24 hours) at 11/16/2021 1211  Last data filed at 11/16/2021 1113  Gross per 24 hour   Intake 1180.35 ml   Output 2520 ml   Net -1339.65 ml       Labs:   Recent Labs     11/14/21  0541 11/15/21  0509 11/16/21  0520   WBC 16.2* 9.8 7.0   HGB 10.7* 9.8* 9.6*   HCT 33.5* 29.8* 28.8*    136* 117*      Recent Labs     11/14/21  0541 11/15/21  0509 11/16/21  0520    132* 138   K 4.7 4.2 4.0    100 104   CO2 23 23 26   BUN 40* 54* 61*   CREATININE 1.3 1.0 0.9   CALCIUM 8.6 8.6 9.1   AST 29 19 17   ALT 20 16 17   BILITOT 0.5 0.5 0.6   ALKPHOS 77 69 64     Radiology:  XR CHEST PORTABLE   Final Result   1. Stable lines, tubes, and support devices. 2. Unchanged patchy ground-glass opacities with small effusions. XR ABDOMEN (2 VIEWS)   Final Result   No evidence for bowel obstruction      Enteric catheter coiled in fundus of stomach         XR CHEST PORTABLE   Final Result   Redemonstration of diffuse bilateral interstitial and airspace opacities,   which may reflect multifocal pneumonia or pulmonary edema, appearing grossly   similar to the prior study. Supporting devices as above, appearing grossly similar to the prior study. XR CHEST PORTABLE   Final Result   Interval worsening. Severe diffuse infiltrates and/or congestion identified   in the lungs. XR CHEST PORTABLE   Final Result   Multifocal opacities are redemonstrated. The apparent worsening of the right   lung may be due to decreased inflation. XR CHEST PORTABLE   Final Result   Stable chest x-ray with multifocal opacities. Lines and tubes are acceptable. XR CHEST PORTABLE   Final Result   Similar appearance of extensive bilateral airspace opacities,      Endotracheal tube in appropriate position in the midthoracic trachea. Remaining tubes and lines are also unchanged. XR CHEST PORTABLE   Final Result   No change in life support. Moderate to severe bilateral airspace disease, similar to the prior exam.         XR ABDOMEN FOR NG/OG/NE TUBE PLACEMENT   Final Result   Nasogastric tube tip is in the stomach         XR CHEST PORTABLE   Final Result   Endotracheal tube with the tip at T4. New right internal jugular central venous line with the tip in the superior   vena cava. No pneumothorax. Bilateral pulmonary opacities are unchanged. CT PELVIS WO CONTRAST Additional Contrast? None   Final Result   1. A Gómez catheter has been pulled back with the balloon portion inflated in   the penile urethra. Repositioning is recommended. The catheter is not   within the bladder. XR CHEST PORTABLE   Final Result   1. Bilateral lung infiltrates, compatible with pneumonia. 2. Trace right apical pneumothorax. 3. Pneumomediastinum. 4. No left pneumothorax. XR ABDOMEN (KUB) (SINGLE AP VIEW)   Final Result   1. No evidence of bowel obstruction. 2. Suspected bilateral nephrolithiasis. XR CHEST PORTABLE   Final Result   1. Diffuse bilateral airspace opacities without significant change. 2. Trace biapical pneumothoraces pneumomediastinum and pneumopericardium with   subcutaneous emphysematous changes involving the base the neck. No   significant change. XR CHEST PORTABLE   Final Result   1. Extensive bilateral airspace opacities without significant change.    2. Pneumomediastinum, pneumopericardium, and trace biapical pneumothoraces   without significant change. XR CHEST PORTABLE   Final Result   Patchy airspace opacities bilaterally, may be related to pulmonary edema   versus pneumonia. Stable pneumomediastinum      No definite pneumothorax. XR CHEST PORTABLE   Final Result   New pneumomediastinum, subcutaneous emphysema and trace right apical   pneumothorax. XR CHEST PORTABLE   Final Result   1. Stable diffuse airspace opacities. XR CHEST PORTABLE   Final Result   Findings most consistent with severe bilateral COVID pneumonia. This appears   progressed since the recent prior CT exam.         CTA PULMONARY W CONTRAST   Final Result   1. No pulmonary embolus. 2. Pulmonary opacities compatible with viral pneumonia. 3. Reactive mediastinal and hilar lymphadenopathy. XR CHEST PORTABLE    (Results Pending)           Maioc Echevarria MD      Please excuse brevity and/or typos. This report was transcribed using voice recognition software. Every effort was made to ensure accuracy, however, inadvertent computerized transcription errors may be present.

## 2021-11-16 NOTE — PROGRESS NOTES
Comprehensive Nutrition Assessment    Type and Reason for Visit:  Reassess    Nutrition Recommendations/Plan:   · EN Order: Vital AF 1.2 @ 30 ml/hr which will provide 864 kcal and 54 g protein  · Add Proteinex TID to help meet estimated protein needs  · Please do not stop tube feeds for GRV less than 500 in the absence of other signs of intolerance  · Continue Reglan  · Will closely monitor GI tolerance, nutrition status, poc    Nutrition Assessment:  pt remains intubated, noted EN restarted yesterday, EN infusing @ 20 ml/hr currently per flowsheet, no BM documented in the past 8 days per flowsheet, noted KUB on 11/14 showed no ileus or bowel obstruction noted,  will continue to follow pt at high nutrition risk    Malnutrition Assessment:  Malnutrition Status:  Severe malnutrition    Context:  Acute Illness       Estimated Daily Nutrient Needs:  Energy (kcal):  3730 based on Haim stated 2010; Weight Used for Energy Requirements:  Current     Protein (g):  146+ (2+ g/kg IBW);  Weight Used for Protein Requirements:  Ideal        Fluid (ml/day):  2200; Method Used for Fluid Requirements:  1 ml/kcal      Nutrition Related Findings:  Meds: vasopressin, versed, and fentanyl ggt, reglan      Wounds:  None       Current Nutrition Therapies:    Current Tube Feeding (TF) Orders:  · Feeding Route: Orogastric  · Formula: Peptide Based (Vital AF 1.2)  · Schedule: Continuous (20 ml/hr)  · Current TF & Flush Orders Provides: 576 kcal and 36 g protein    Anthropometric Measures:  · Height: 5' 9.02\" (175.3 cm)  · Current Body Weight: 245 lb 6 oz (111.3 kg)   · Admission Body Weight: 242 lb 15.2 oz (110.2 kg) (first measured weight)   · Usual Body Weight: 254 lb (115.2 kg)     · Ideal Body Weight: 160 lbs; % Ideal Body Weight 153.4 %   · BMI: 36.2  · BMI Categories: Obese Class 2 (BMI 35.0 -39.9)       Nutrition Diagnosis:   · Inadequate oral intake related to acute injury/trauma as evidenced by NPO or clear liquid status due to medical condition    Nutrition Interventions:   Food and/or Nutrient Delivery:  Modify Tube Feeding  Nutrition Education/Counseling:  No recommendation at this time   Coordination of Nutrition Care:  Continue to monitor while inpatient    Goals:  Pt will meet greater than 75% of estimated nutrient needs via EN       Nutrition Monitoring and Evaluation:   Behavioral-Environmental Outcomes:  None Identified   Food/Nutrient Intake Outcomes:  Enteral Nutrition Intake/Tolerance  Physical Signs/Symptoms Outcomes:  Biochemical Data, GI Status, Hemodynamic Status, Fluid Status or Edema, Weight, Skin     Discharge Planning:     Too soon to determine     Electronically signed by Oli Cain, MS, RD, LD on 11/16/21 at 9:52 AM EST    Contact: [phone]

## 2021-11-16 NOTE — CONSULTS
Palliative Medicine Consultation    Reason for Consult:      _____ Advance Care Planning  _____Transition of Care Planning  _____ Psychosocial Support  _____ Symptom Management: (Specify Symptom)  ***    Recommendations:    1. ***    Requesting Physician:  ***    CHIEF COMPLAINT:  ***    History Obtained From:  {HISTORY SOURCE:408520608::\"patient\"}    HISTORY OF PRESENT ILLNESS:                  Past Medical History:    {PAST MEDICAL HISTORY:305252924}    Past Surgical History:    {PAST SURGICAL HISTORY:431636284}    Current Medications:    {CURRENT MEDICATIONS:724803066}    Allergies:  Amoxicillin    Social History:    {SOCIAL OHYUXPO:168674409}    Family History:   {FAMILY KRSPFRW:907309529}    REVIEW OF SYSTEMS:    {REVIEW OF CFJCNUN:212207483}    Vitals:    Vitals:    11/16/21 1248   BP:    Pulse: 84   Resp: 26   Temp:    SpO2: (!) 89%       PHYSICAL EXAM:    GENERAL:  HEENT: No cervical adenopathy, MM moist, PERRL  COR:  LUNGS:  ABD:  SKIN:  PSYCH:  NEURO: CN II-XII grossly intact    DATA:    {LAB REVIEW:278831410}  IMPRESSION:    ***    The patient and/or family were seen for  *** minutes and greater than 50% was in counseling and coordination of care.     Electronically signed by Jesse Mukherjee MD on 11/16/2021 at 12:59 PM

## 2021-11-16 NOTE — PROGRESS NOTES
11/16/21 0219   Vent Information   Vent Type 840   Vent Mode AC/PC   Pressure Ordered 35   Rate Set 28 bmp   Peak Flow 50 L/min   FiO2  80 %   SpO2 95 %   SpO2/FiO2 ratio 118.75   Sensitivity 3   PEEP/CPAP 9   I Time/ I Time % 0.8 s   Humidification Source HME   Nitric Oxide/Epoprostenol In Use? No   Vent Patient Data   Peak Inspiratory Pressure 45 cmH2O   Mean Airway Pressure 21 cmH20   Rate Measured 28 br/min   Vt Exhaled 443 mL   Minute Volume 12.8 Liters   I:E Ratio 2:1.7   Plateau Pressure 35 WGW05   Static Compliance 19 mL/cmH2O   Total PEEP 11 cmH20   Auto PEEP 2.3 cmH20   Spontaneous Breathing Trial (SBT) RT Doc   Pulse 67   Additional Respiratory  Assessments   Resp 15   End Tidal CO2 28 (%)   Position Semi-Conteh's   Oral Care Completed? No   Alarm Settings   High Pressure Alarm 50 cmH2O   Delay Alarm 20 sec(s)   Low Minute Volume Alarm 2.5 L/min   Apnea (secs) 20 secs   High Respiratory Rate 40 br/min   Low Exhaled Vt  250 mL   Patient Observation   Observations Pt sleeping   ETT (adult)   Placement Date/Time: 11/08/21 1122   Preoxygenation: Yes  Mask Ventilation: Ventilated by mask (1)  Technique: Direct laryngoscopy  Type: Cuffed  Tube Size: 8 mm  Laryngoscope: Mac  Blade Size: 3  Location: Oral  Insertion attempts: 1  Placement Verif. ..    Secured at 25 cm   Measured From Lips   ET Placement Left   Secured By Commercial tube culver   Site Condition Dry

## 2021-11-17 NOTE — PROGRESS NOTES
Pulmonary and Critical Care  Progress Note      VITALS:  BP (!) 112/55   Pulse 79   Temp 100.1 °F (37.8 °C) (Rectal)   Resp 28   Ht 5' 9.02\" (1.753 m)   Wt 245 lb 6 oz (111.3 kg)   SpO2 91%   BMI 36.22 kg/m²     Subjective:   CHIEF COMPLAINT :SOB     HPI:                The patient is on the vent and sedated. He is still on 100% fio2. He has minimal response to painful stimuli. Objective:   PHYSICAL EXAM:    LUNGS:Occasional basal crackles  Abd-soft, BS+,NT  Ext- no pedal edema  CVS-s1s2, no murmurs      DATA:    CBC:  Recent Labs     11/15/21  0509 11/16/21  0520 11/17/21  0420   WBC 9.8 7.0 9.4   RBC 3.12* 2.99* 2.94*   HGB 9.8* 9.6* 9.2*   HCT 29.8* 28.8* 27.9*   * 117* 135*   MCV 95.5 96.3 94.9   MCH 31.4* 32.1* 31.3*   MCHC 32.9 33.3 33.0   RDW 13.9 13.9 13.9   SEGSPCT 88.3* 79.4* 86.6*      BMP:  Recent Labs     11/15/21  0509 11/16/21  0520 11/17/21  0420   * 138 141   K 4.2 4.0 4.1    104 104   CO2 23 26 28   BUN 54* 61* 55*   CREATININE 1.0 0.9 0.9   CALCIUM 8.6 9.1 8.8   GLUCOSE 171* 135* 134*      ABG:  Recent Labs     11/15/21  0800 11/16/21  1115 11/17/21  1000   PH 7.27* 7.34 7.50*   PO2ART 71* 73* 80   VYZ3GXL 59.0* 57.0* 41.0   O2SAT 92.9* 94.6* 95.1*     BNP  No results found for: BNP   D-Dimer:  Lab Results   Component Value Date    DDIMER 837 (H) 10/29/2021      Radiology:   Again identified are diffuse multifocal infiltrates throughout the lungs   bilaterally with a similar appearance.      1.       Assessment/Plan     Patient Active Problem List    Diagnosis Date Noted    Acute respiratory failure with hypoxia ([address] 11/16/2021    Cardiac arrest with ventricular fibrillation ([facility] 11/16/2021    Septic shock ([facility] [address] 11/16/2021    Hematuria 11/16/2021    Pneumonia due to COVID-19 virus 10/27/2021   s/p In hospital Cardiac arrest  VDRF  Acute Hypoxic resp failure- worsened  Bilateral Pneumonia sec to COVID-19  Obesity  MARIA ELENA- improved 1. Decadron  2. Insulin  3. Inhalers  4. Tube feeds  5. CPT  6. Keep sats > 88%  7. Prone ventilation as tolerated  8. PT/OT  9. CXR in am  10. Daily SAT and SBT trials  11.  C.w present management    Electronically signed by Reanna Colon MD on 11/17/2021 at 11:47 AM

## 2021-11-17 NOTE — PROGRESS NOTES
Hospitalist Progress Note      PCP: Sarbjit Mims MD    Date of Admission: 10/26/2021    LOS: 21      Case Summary:   78-year-old gentleman with history of hypertension, hyperlipidemia admitted with acute respiratory failure and hypoxia due to COVID-19 pneumonia on 10/26/2021. Admission course was complicated by V. fib arrest on 11 8 with ROSC. He had a retained Gómez catheter with hematuria and consulted by urology. CT abdomen pelvis was noted for Gómez catheter within the penile urethra with balloon inflated. Balloon was punctured with a needle and Gómez removed. Active Hospital Problems    Diagnosis Date Noted    Acute respiratory failure with hypoxia ([address] [J96.01] 11/16/2021    Cardiac arrest with ventricular fibrillation ([address] [I46.9, I49.01] 11/16/2021    Septic shock (HCC) [A41.9, R65.21] 11/16/2021    Hematuria [R31.9] 11/16/2021    Pneumonia due to COVID-19 virus [U07.1, J12.82] 10/27/2021         Principal Problem:    Pneumonia due to COVID-19 virus and Acute respiratory failure with hypoxia: Remains intubated on the vent. Prognosis guarded. Completed baricitinib  -Continue on dexamethasone  -Continue empiric antibiotic with cefepime and vancomycin  -Remains on the vent with vent management per pulmonology  -Pulmonology following with recommendation      Septic shock [facility]): In the setting of Covid infection. This was complicated by cardiac arrest.  Remains on minimal pressor support and will titrate off as tolerated with target MAP greater than 65      Tube feeds: No ileus or bowel obstruction. Patient has residuals. Will periodically hold tube feeds. Continue with Reglan    Active Problems:    Hematuria: In the setting of retained Gómez and urethral with balloon inflated. Urology was consulted and Gómez deflated and removed. Kiara-colored urine. Urology following with recommendation. Cardiac arrest with ventricular fibrillation ([address]): With nonsustained VT.   Cardiology was consulted. Noted to have wide-complex tachycardia with nonsustained probably in the setting of hypoxia.  -Continue supportive care per cardiology  -Monitor electrolytes and divalent to keep magnesium greater than 2 and potassium greater than 4  -As needed doses of beta-blockers      Hyperlipidemia: On statin          Medications:  Reviewed  Infusion Medications    vasopressin (Septic Shock) infusion 0.01 Units/min (11/17/21 0806)    midazolam 7 mg/hr (11/17/21 0957)    fentaNYL 175 mcg/hr (11/17/21 0806)    sodium chloride       Scheduled Medications    lidocaine   Topical Once    dexamethasone  6 mg IntraVENous Daily    cefepime  2,000 mg IntraVENous Q8H    ipratropium  4 puff Inhalation Q4H WA    tamsulosin  0.4 mg Oral Daily    metoclopramide  10 mg IntraVENous Q8H    pantoprazole  40 mg IntraVENous Daily    atorvastatin  10 mg Oral Daily    sodium chloride flush  5-40 mL IntraVENous 2 times per day    enoxaparin  30 mg SubCUTAneous BID     PRN Meds: magnesium sulfate, sodium chloride flush, ALPRAZolam, sodium chloride, sodium chloride flush, ondansetron **OR** ondansetron, polyethylene glycol, acetaminophen **OR** acetaminophen      DVT Prophylaxis: Subcut enoxaparin  Diet: No diet orders on file  Code Status: Full Code    Dispo: Critical in ICU  Pt has a high probability of imminent or life-threatening deterioration requiring close monitoring, and highly complex decision-making and/or interventions of high intensity to assess, manipulate, and support his critical organ systems to prevent a likely inevitable decline which could occur if left untreated.   33 minutes of critical care time spent.      ____________________________________________________________________________    Subjective:   Overnight Events:   Uneventful overnight        Physical Exam:  BP (!) 119/55   Pulse 79   Temp 100.1 °F (37.8 °C) (Rectal)   Resp 22   Ht 5' 9.02\" (1.753 m)   Wt 245 lb 6 oz (111.3 kg)   SpO2 93%   BMI 36.22 kg/m²   General appearance: No apparent distress, appears stated age and cooperative. HEENT: Normocephalic, atraumatic, MMM, No sclera icterus/conjuctival palor  Neck: Supple, no thyromegally. No jugular venous distention. Intubated and sedated  Respiratory:  Normal respiratory effort. Clear to auscultation, on the vent  Cardiovascular: S1/S2 without murmurs, rubs or gallops. RRR  Abdomen: Soft, non-tender, non-distended, bowel sounds present. Musculoskeletal: No clubbing, cyanosis or edema bilaterally. Skin: Skin color, texture, turgor normal.  No rashes or lesions. Neurologic: Intubated sedated on the vent      Intake/Output Summary (Last 24 hours) at 11/17/2021 1110  Last data filed at 11/17/2021 0800  Gross per 24 hour   Intake 758.42 ml   Output 2820 ml   Net -2061.58 ml       Labs:   Recent Labs     11/15/21  0509 11/16/21  0520 11/17/21  0420   WBC 9.8 7.0 9.4   HGB 9.8* 9.6* 9.2*   HCT 29.8* 28.8* 27.9*   * 117* 135*      Recent Labs     11/15/21  0509 11/16/21  0520 11/17/21  0420   * 138 141   K 4.2 4.0 4.1    104 104   CO2 23 26 28   BUN 54* 61* 55*   CREATININE 1.0 0.9 0.9   CALCIUM 8.6 9.1 8.8   AST 19 17 22   ALT 16 17 21   BILITOT 0.5 0.6 0.8   ALKPHOS 69 64 71     No results for input(s): CKTOTAL, TROPONINI in the last 72 hours. Urinalysis:  No results found for: Xiomara Keenan, BACTERIA, RBCUA, BLOODU, [address], [address]    Radiology:  XR CHEST PORTABLE   Final Result   Again identified are diffuse multifocal infiltrates throughout the lungs   bilaterally with a similar appearance. XR CHEST PORTABLE   Final Result   1. Stable lines, tubes, and support devices. 2. Unchanged patchy ground-glass opacities with small effusions.          XR ABDOMEN (2 VIEWS)   Final Result   No evidence for bowel obstruction      Enteric catheter coiled in fundus of stomach         XR CHEST PORTABLE   Final Result   Redemonstration of diffuse bilateral interstitial and airspace opacities,   which may reflect multifocal pneumonia or pulmonary edema, appearing grossly   similar to the prior study. Supporting devices as above, appearing grossly similar to the prior study. XR CHEST PORTABLE   Final Result   Interval worsening. Severe diffuse infiltrates and/or congestion identified   in the lungs. XR CHEST PORTABLE   Final Result   Multifocal opacities are redemonstrated. The apparent worsening of the right   lung may be due to decreased inflation. XR CHEST PORTABLE   Final Result   Stable chest x-ray with multifocal opacities. Lines and tubes are acceptable. XR CHEST PORTABLE   Final Result   Similar appearance of extensive bilateral airspace opacities,      Endotracheal tube in appropriate position in the midthoracic trachea. Remaining tubes and lines are also unchanged. XR CHEST PORTABLE   Final Result   No change in life support. Moderate to severe bilateral airspace disease, similar to the prior exam.         XR ABDOMEN FOR NG/OG/NE TUBE PLACEMENT   Final Result   Nasogastric tube tip is in the stomach         XR CHEST PORTABLE   Final Result   Endotracheal tube with the tip at T4. New right internal jugular central venous line with the tip in the superior   vena cava. No pneumothorax. Bilateral pulmonary opacities are unchanged. CT PELVIS WO CONTRAST Additional Contrast? None   Final Result   1. A Gómez catheter has been pulled back with the balloon portion inflated in   the penile urethra. Repositioning is recommended. The catheter is not   within the bladder. XR CHEST PORTABLE   Final Result   1. Bilateral lung infiltrates, compatible with pneumonia. 2. Trace right apical pneumothorax. 3. Pneumomediastinum. 4. No left pneumothorax. XR ABDOMEN (KUB) (SINGLE AP VIEW)   Final Result   1. No evidence of bowel obstruction. 2. Suspected bilateral nephrolithiasis.          XR CHEST PORTABLE Final Result   1. Diffuse bilateral airspace opacities without significant change. 2. Trace biapical pneumothoraces pneumomediastinum and pneumopericardium with   subcutaneous emphysematous changes involving the base the neck. No   significant change. XR CHEST PORTABLE   Final Result   1. Extensive bilateral airspace opacities without significant change. 2. Pneumomediastinum, pneumopericardium, and trace biapical pneumothoraces   without significant change. XR CHEST PORTABLE   Final Result   Patchy airspace opacities bilaterally, may be related to pulmonary edema   versus pneumonia. Stable pneumomediastinum      No definite pneumothorax. XR CHEST PORTABLE   Final Result   New pneumomediastinum, subcutaneous emphysema and trace right apical   pneumothorax. XR CHEST PORTABLE   Final Result   1. Stable diffuse airspace opacities. XR CHEST PORTABLE   Final Result   Findings most consistent with severe bilateral COVID pneumonia. This appears   progressed since the recent prior CT exam.         CTA PULMONARY W CONTRAST   Final Result   1. No pulmonary embolus. 2. Pulmonary opacities compatible with viral pneumonia. 3. Reactive mediastinal and hilar lymphadenopathy. Maico Sadler MD      Please excuse brevity and/or typos. This report was transcribed using voice recognition software. Every effort was made to ensure accuracy, however, inadvertent computerized transcription errors may be present.

## 2021-11-17 NOTE — PROGRESS NOTES
Patient's daughter gave this RN FMLA paperwork for completion. RN notified daughter that paperwork has to be reviewed by MD. Hospitalist notified.

## 2021-11-17 NOTE — PROGRESS NOTES
Patient daughter at bedside. Full update given at this time. Daughter states she will be out of the country for two weeks, starting this weekend so getting updates on patient will be difficult. Daughter states her and patient's son are in agreement with healthcare decisions regarding their dad and would like updates given to the son. His name is Sabas Bryant and number is [phone]. AM ABGs collected and sent to respiratory.

## 2021-11-17 NOTE — PROGRESS NOTES
RN notified Dr. Prudencio Camilo of no AM ABG results, as no order to collect. Patient is on the ventilator.  New telephone orders received from MD to collect and send ABGs

## 2021-11-17 NOTE — PROGRESS NOTES
11/17/21 1206   Vent Information   Equipment Changed HME   Vent Type 840   Vent Mode AC/PC   Pressure Ordered 35   Rate Set 26 bmp   Peak Flow 50 L/min   FiO2  80 %   SpO2 93 %   SpO2/FiO2 ratio 116.25   Sensitivity 3   PEEP/CPAP 12   Humidification Source HME   Nitric Oxide/Epoprostenol In Use? No   Vent Patient Data   Peak Inspiratory Pressure 47 cmH2O   Mean Airway Pressure 24 cmH20   Rate Measured 27 br/min   Vt Exhaled 593 mL   Minute Volume 16.5 Liters   I:E Ratio 1:1.9   Cough/Sputum   Sputum How Obtained Endotracheal; Suctioned   $Obtained Sample $Induced Sputum   Cough Non-productive   Frequency Infrequent   Sputum Amount None   Sputum Color None   Tenacity None   Spontaneous Breathing Trial (SBT) RT Doc   Pulse 82   Breath Sounds   Right Upper Lobe Diminished   Right Middle Lobe Diminished   Right Lower Lobe Diminished   Left Upper Lobe Diminished   Left Lower Lobe Diminished   Additional Respiratory  Assessments   Resp 26   Position Semi-Conteh's   Alarm Settings   High Pressure Alarm 50 cmH2O   Delay Alarm 20 sec(s)   Low Minute Volume Alarm 2.5 L/min   Apnea (secs) 20 secs   High Respiratory Rate 45 br/min   Low Exhaled Vt  250 mL   ETT (adult)   Placement Date/Time: 11/08/21 1122   Preoxygenation: Yes  Mask Ventilation: Ventilated by mask (1)  Technique: Direct laryngoscopy  Type: Cuffed  Tube Size: 8 mm  Laryngoscope: Mac  Blade Size: 3  Location: Oral  Insertion attempts: 1  Placement Verif. ..    Secured at 24 cm   Measured From [address] [place] [address],[address] By [employer] tube culver   Site Condition Dry

## 2021-11-17 NOTE — PROGRESS NOTES
11/17/21 0824   Vent Information   $Ventilation $Subsequent Day   Vent Type 840   Vent Mode AC/PC   Vt Ordered 500 mL   Pressure Ordered 35   Rate Set 28 bmp   Peak Flow 50 L/min   FiO2  80 %   SpO2 94 %   SpO2/FiO2 ratio 117.5   Sensitivity 3   PEEP/CPAP 12   Humidification Source HME   Nitric Oxide/Epoprostenol In Use? No   Vent Patient Data   Peak Inspiratory Pressure 48 cmH2O   Mean Airway Pressure 25 cmH20   Rate Measured 28 br/min   Vt Exhaled 611 mL   Minute Volume 17.1 Liters   I:E Ratio 1:1.7   Plateau Pressure 26 [id number]   Static Compliance 41 mL/cmH2O   Total PEEP 14 cmH20   Auto PEEP 1.6 cmH20   Cough/Sputum   Sputum How Obtained Endotracheal; Suctioned   $Obtained Sample $Induced Sputum   Cough Productive   Frequency Infrequent   Sputum Amount Moderate   Sputum Color Creamy; Tan   Tenacity Thick   Spontaneous Breathing Trial (SBT) RT Doc   Pulse 77   Breath Sounds   Right Upper Lobe Diminished   Right Middle Lobe Diminished   Right Lower Lobe Diminished   Left Upper Lobe Diminished   Left Lower Lobe Diminished   Additional Respiratory  Assessments   Resp 25   Position Semi-Conteh's   Oral Care Mouth suctioned   Subglottic Suction Done? Yes   Alarm Settings   High Pressure Alarm 50 cmH2O   Delay Alarm 20 sec(s)   Low Minute Volume Alarm 2.5 L/min   Apnea (secs) 20 secs   High Respiratory Rate 45 br/min   Low Exhaled Vt  250 mL   ETT (adult)   Placement Date/Time: 11/08/21 1122   Preoxygenation: Yes  Mask Ventilation: Ventilated by mask (1)  Technique: Direct laryngoscopy  Type: Cuffed  Tube Size: 8 mm  Laryngoscope: Mac  Blade Size: 3  Location: Oral  Insertion attempts: 1  Placement Verif. ..    Secured at 24 cm   Measured From [address] [place] [address],[address] By [employer] tube culver   Site Condition Dry

## 2021-11-17 NOTE — PROGRESS NOTES
Today's plan:  [facility]      Admit Date:  10/26/2021    Subjective:OK      Chief complaints on admission  Chief Complaint   Patient presents with    Other     covid + low 02 at home symptoms started last thursday tested positive this weekend.  Shortness of Breath         History of present illness:Cecilio is a 78 y. o.year old who  presents with had concerns including Other (covid + low 02 at home symptoms started last thursday tested positive this weekend.) and Shortness of Breath. Past medical history:    has a past medical history of Hyperlipidemia and Hypertension. Past surgical history:   has a past surgical history that includes Ankle surgery; knee surgery; and Hand surgery. Social History:   reports that he has never smoked. He does not have any smokeless tobacco history on file. He reports current alcohol use. He reports that he does not use drugs. Family history:  family history is not on file. Allergies   Allergen Reactions    Amoxicillin          Objective:   BP (!) 112/55   Pulse 79   Temp 100.1 °F (37.8 °C) (Rectal)   Resp 28   Ht 5' 9.02\" (1.753 m)   Wt 245 lb 6 oz (111.3 kg)   SpO2 93%   BMI 36.22 kg/m²       Intake/Output Summary (Last 24 hours) at 11/17/2021 1111  Last data filed at 11/17/2021 0800  Gross per 24 hour   Intake 758.42 ml   Output 2820 ml   Net -2061.58 ml       TELEMETRY:SINUS  Physical Exam:  Constitutional:  Well developed, Well nourished, No acute distress, Non-toxic appearance. HENT:  Normocephalic, Atraumatic, Bilateral external ears normal, Oropharynx moist, No oral exudates, Nose normal. Neck- Normal range of motion, No tenderness, Supple, No stridor. Eyes:  PERRL, EOMI, Conjunctiva normal, No discharge. Respiratory:  Normal breath sounds, No respiratory distress, No wheezing, No chest tenderness. ,no use of accessory muscles, diaphragm movement is normal  Cardiovascular: (PMI) apex non displaced,no lifts no thrills, no s3,no s4, Normal heart rate, Normal rhythm, No murmurs, No rubs, No gallops. Carotid arteries pulse and amplitude are normal no bruit, no abdominal bruit noted ( normal abdominal aorta ausculation), femoral arteries pulse and amplitude are normal no bruit, pedal pulses are normal  GI:  Bowel sounds normal, Soft, No tenderness, No masses, No pulsatile masses. : External genitalia appear normal, No masses or lesions. No discharge. No CVA tenderness. Musculoskeletal:  Intact distal pulses, No edema, No tenderness, No cyanosis, No clubbing. Good range of motion in all major joints. No tenderness to palpation or major deformities noted. Back- No tenderness. Integument:  Warm, Dry, No erythema, No rash. Lymphatic:  No lymphadenopathy noted.    Neurologic:  INTUBATED     Medications:    lidocaine   Topical Once    dexamethasone  6 mg IntraVENous Daily    cefepime  2,000 mg IntraVENous Q8H    ipratropium  4 puff Inhalation Q4H WA    tamsulosin  0.4 mg Oral Daily    metoclopramide  10 mg IntraVENous Q8H    pantoprazole  40 mg IntraVENous Daily    atorvastatin  10 mg Oral Daily    sodium chloride flush  5-40 mL IntraVENous 2 times per day    enoxaparin  30 mg SubCUTAneous BID      vasopressin (Septic Shock) infusion Stopped (11/17/21 1111)    midazolam 7 mg/hr (11/17/21 0957)    fentaNYL 175 mcg/hr (11/17/21 0806)    sodium chloride       magnesium sulfate, sodium chloride flush, ALPRAZolam, sodium chloride, sodium chloride flush, ondansetron **OR** ondansetron, polyethylene glycol, acetaminophen **OR** acetaminophen    Lab Data:  CBC:   Recent Labs     11/15/21  0509 11/16/21  0520 11/17/21  0420   WBC 9.8 7.0 9.4   HGB 9.8* 9.6* 9.2*   HCT 29.8* 28.8* 27.9*   MCV 95.5 96.3 94.9   * 117* 135*     BMP:   Recent Labs     11/15/21  0509 11/16/21 0520 11/17/21  0420   * 138 141   K 4.2 4.0 4.1    104 104   CO2 23 26 28   BUN 54* 61* 55*   CREATININE 1.0 0.9 0.9     LIVER PROFILE:   Recent Labs     11/15/21  0509 11/16/21  0520 11/17/21  0420   AST 19 17 22   ALT 16 17 21   BILITOT 0.5 0.6 0.8   ALKPHOS 69 64 71     PT/INR: No results for input(s): PROTIME, INR in the last 72 hours. APTT: No results for input(s): APTT in the last 72 hours. BNP:  No results for input(s): BNP in the last 72 hours. TROPONIN: @TROPONINI:3@      Assessment:  78 y. o.year old who is admitted for          Plan:ADMITTED TO ICU  Covid 19 PNEUMONIA with respiratory failure intubated, and sedated,   Bradycardia\" better since propofol changed to versed  Shock\" on pressors  NSVT; resolved  All labs, medications and tests reviewed, continue all other medications of all above medical condition listed as is.       Malik Jc MD, MD 11/17/2021 11:11 AM

## 2021-11-17 NOTE — PROGRESS NOTES
11/17/21 1610   Vent Information   Vent Type 840   Vent Mode AC/PC   Pressure Ordered 35   Rate Set 26 bmp   Peak Flow 50 L/min   FiO2  80 %   SpO2 94 %   SpO2/FiO2 ratio 117.5   Sensitivity 3   PEEP/CPAP 12   Humidification Source HME   Vent Patient Data   Peak Inspiratory Pressure 47 cmH2O   Mean Airway Pressure 24 cmH20   Rate Measured 26 br/min   Vt Exhaled 530 mL   Minute Volume 13.5 Liters   I:E Ratio 1:1.9   Plateau Pressure 22 [id number]   Static Compliance 40 mL/cmH2O   Total PEEP 12 cmH20   Auto PEEP 2 cmH20   Cough/Sputum   Sputum How Obtained Endotracheal; Suctioned   $Obtained Sample $Induced Sputum   Cough Productive   Frequency Infrequent   Sputum Amount Moderate   Sputum Color Creamy; Tan   Tenacity Thick   Spontaneous Breathing Trial (SBT) RT Doc   Pulse 93   Breath Sounds   Right Upper Lobe Diminished   Right Middle Lobe Diminished   Right Lower Lobe Diminished   Left Upper Lobe Diminished   Left Lower Lobe Diminished   Additional Respiratory  Assessments   Resp 25   Position Semi-Conteh's   Oral Care Mouth suctioned   Alarm Settings   High Pressure Alarm 50 cmH2O   Delay Alarm 20 sec(s)   Low Minute Volume Alarm 2.5 L/min   Apnea (secs) 20 secs   High Respiratory Rate 45 br/min   Low Exhaled Vt  250 mL   ETT (adult)   Placement Date/Time: 11/08/21 1122   Preoxygenation: Yes  Mask Ventilation: Ventilated by mask (1)  Technique: Direct laryngoscopy  Type: Cuffed  Tube Size: 8 mm  Laryngoscope: Mac  Blade Size: 3  Location: Oral  Insertion attempts: 1  Placement Verif. ..    Secured at 24 cm   Measured From [address] [place] [address],[address] By [employer] tube culver   Site Condition Dry

## 2021-11-17 NOTE — PROGRESS NOTES
Tele made this RN aware of patient having runs of SVT. RN made hospitalist aware, as patient has not had issues with HR all day.

## 2021-11-17 NOTE — PROGRESS NOTES
Infectious Disease Progress Note  2021   Patient Name: Aurea Smart : 1955       Reason for visit: F/u COVID-19 pneumonia, acute respiratory failure   History: Interval history noted  Intubated, sedated and on mechanical ventilation  Physical Exam:  Vital Signs: BP (!) 102/53   Pulse 82   Temp 100.1 °F (37.8 °C) (Rectal)   Resp 26   Ht 5' 9.02\" (1.753 m)   Wt 245 lb 6 oz (111.3 kg)   SpO2 93%   BMI 36.22 kg/m²     Gen: Intubated and on mechanical ventilation  Skin: no stigmata of endocarditis  Wounds: C/D/I  HEMT: AT/NC ETT and OGT  Eyes: PERRLA, EOMI, conjunctiva pink, sclera anicteric. Neck: Supple. Trachea midline. No LAD. Chest: Transmitted breath sounds  Heart: RRR and no MRG. Abd: soft, non-distended, no tenderness, no hepatomegaly. Normoactive bowel sounds. Ext: no clubbing, cyanosis, or edema  Catheter Site: without erythema or tenderness  LDA: CVC: Right internal jugular; urethral catheter placed on 2021  Neuro: Intubated on mechanical ventilation       Radiologic / Imaging / TESTING  ONE XRAY VIEW OF THE CHEST       2021 6:51 am       COMPARISON:   11/15/2021       HISTORY:   ORDERING SYSTEM PROVIDED HISTORY: Hypoxia   TECHNOLOGIST PROVIDED HISTORY:   Reason for exam:->Hypoxia   Reason for Exam: Hypoxia   Acuity: Unknown   Type of Exam: Unknown       FINDINGS:   The endotracheal tube appears in similar position just above the thoracic   inlet. The enteric catheter is coiled in the gastric fundus. A right-sided   small bore central venous catheter projects over the superior vena cava. Cardiac mediastinal silhouettes appear stable. Extensive multifocal   infiltrates are seen throughout the lungs bilaterally. Osseous structures   appear similar. Impression   Again identified are diffuse multifocal infiltrates throughout the lungs   bilaterally with a similar appearance.           Labs:    Recent Results (from the past 24 hour(s))   IgG, IgA, IgM Collection Time: 11/16/21  6:30 PM   Result Value Ref Range    IgG, Serum 543 (L) 723 - 1,685 MG/DL    IgA 135 69 - 382 MG/DL    IgM,Serum 62 62 - 277 MG/DL   Sedimentation Rate    Collection Time: 11/16/21  6:30 PM   Result Value Ref Range    Sed Rate 37 (H) 0 - 20 MM/HR   C-Reactive Protein    Collection Time: 11/16/21  6:30 PM   Result Value Ref Range    CRP, High Sensitivity 104.1 mg/L   Ferritin    Collection Time: 11/16/21  9:10 PM   Result Value Ref Range    Ferritin 2,544 (H) 30 - 400 NG/ML   Comprehensive metabolic panel    Collection Time: 11/17/21  4:20 AM   Result Value Ref Range    Sodium 141 135 - 145 MMOL/L    Potassium 4.1 3.5 - 5.1 MMOL/L    Chloride 104 99 - 110 mMol/L    CO2 28 21 - 32 MMOL/L    BUN 55 (H) 6 - 23 MG/DL    CREATININE 0.9 0.9 - 1.3 MG/DL    Glucose 134 (H) 70 - 99 MG/DL    Calcium 8.8 8.3 - 10.6 MG/DL    Albumin 2.9 (L) 3.4 - 5.0 GM/DL    Total Protein 5.0 (L) 6.4 - 8.2 GM/DL    Total Bilirubin 0.8 0.0 - 1.0 MG/DL    ALT 21 10 - 40 U/L    AST 22 15 - 37 IU/L    Alkaline Phosphatase 71 40 - 128 IU/L    GFR Non-African American >60 >60 mL/min/1.73m2    GFR African American >60 >60 mL/min/1.73m2    Anion Gap 9 4 - 16   CBC auto differential    Collection Time: 11/17/21  4:20 AM   Result Value Ref Range    WBC 9.4 4.0 - 10.5 K/CU MM    RBC 2.94 (L) 4.6 - 6.2 M/CU MM    Hemoglobin 9.2 (L) 13.5 - 18.0 GM/DL    Hematocrit 27.9 (L) 42 - 52 %    MCV 94.9 78 - 100 FL    MCH 31.3 (H) 27 - 31 PG    MCHC 33.0 32.0 - 36.0 %    RDW 13.9 11.7 - 14.9 %    Platelets 541 (L) 869 - 440 K/CU MM    MPV 11.0 7.5 - 11.1 FL    Differential Type AUTOMATED DIFFERENTIAL     Segs Relative 86.6 (H) 36 - 66 %    Lymphocytes % 5.5 (L) 24 - 44 %    Monocytes % 4.8 (H) 0 - 4 %    Eosinophils % 0.7 0 - 3 %    Basophils % 0.2 0 - 1 %    Segs Absolute 8.1 K/CU MM    Lymphocytes Absolute 0.5 K/CU MM    Monocytes Absolute 0.5 K/CU MM    Eosinophils Absolute 0.1 K/CU MM    Basophils Absolute 0.0 K/CU MM    Nucleated RBC % 0.0 %    Total Nucleated RBC 0.0 K/CU MM    Total Immature Neutrophil 0.21 K/CU MM    Immature Neutrophil % 2.2 (H) 0 - 0.43 %   Magnesium    Collection Time: 11/17/21  4:20 AM   Result Value Ref Range    Magnesium 2.1 1.8 - 2.4 mg/dl   C-Reactive Protein    Collection Time: 11/17/21  4:20 AM   Result Value Ref Range    CRP, High Sensitivity 187.5 mg/L   Procalcitonin    Collection Time: 11/17/21  4:20 AM   Result Value Ref Range    Procalcitonin 0.528    Blood gas, arterial    Collection Time: 11/17/21 10:00 AM   Result Value Ref Range    pH, Bld 7.50 (H) 7.34 - 7.45    pCO2, Arterial 41.0 32 - 45 MMHG    pO2, Arterial 80 75 - 100 MMHG    Base Exc, Mixed 8 (H) 0 - 1.2    HCO3, Arterial 32.0 (H) 18 - 23 MMOL/L    CO2 Content 33.3 (H) 19 - 24 MMOL/L    O2 Sat 95.1 (L) 96 - 97 %    Carbon Monoxide, Blood 1.9 0 - 5 %    Methemoglobin, Arterial 1.3 <1.5 %    Comment AC PC Pi 48 80% +12      CULTURE results: Invalid input(s): BLOOD CULTURE,  URINE CULTURE, SURGICAL CULTURE    Diagnosis:  Patient Active Problem List   Diagnosis    Pneumonia due to COVID-19 virus    Acute respiratory failure with hypoxia (HCC)    Cardiac arrest with ventricular fibrillation (HCC)    Septic shock (HCC)    Hematuria       Active Problems  Principal Problem:    Pneumonia due to COVID-19 virus  Active Problems:    Acute respiratory failure with hypoxia (HCC)    Cardiac arrest with ventricular fibrillation (HCC)    Septic shock (HCC)    Hematuria  Resolved Problems:    * No resolved hospital problems. *      Impression and plan  Summary and rationale: Patient is a 78 y.o.  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 Unvaccinated for COVID-19. 1 weeks hx of SOB, fever and chills. Diagnosed witih severe covid-19. Intubated and on mechanical ventilator.    COVID-19 symptom onset: 10/21/2021  COVID-19 test date: 10/26/2021  Expected discontinuation of isolation precautions: 11/10/2021  Clinical status: worsening, continues to have fever. Low IgG.   Therapeutic:  Ongoing antibiotics: vancomycin, Avycaz and minocycline  Immunomodulators:   Dexamethasone: -  Baracitinib:  IVI g IV daily for 3 days  Completed antibiotics:   Other agents:   Diagnostic: Trend CRP  F/u:  Other:      Electronically signed by: Electronically signed by Satya Talavera MD on 2021 at 1:57 PM

## 2021-11-18 NOTE — PROGRESS NOTES
Pulmonary and Critical Care  Progress Note      VITALS:  /60   Pulse 87   Temp 98.4 °F (36.9 °C) (Rectal)   Resp 26   Ht 5' 9.02\" (1.753 m)   Wt 231 lb 0.7 oz (104.8 kg)   SpO2 97%   BMI 34.10 kg/m²     Subjective:   CHIEF COMPLAINT :SOB     HPI:                The patient is on th vent and sedated. He is still on high fio2. Objective:   PHYSICAL EXAM:    LUNGS:Occasional basal crackles  Abd-soft, BS+,NT  Ext- no pedal edema  CVS-s1s2, no murmurs      DATA:    CBC:  Recent Labs     11/16/21  0520 11/17/21  0420 11/18/21  0600   WBC 7.0 9.4 9.2   RBC 2.99* 2.94* 3.20*   HGB 9.6* 9.2* 10.0*   HCT 28.8* 27.9* 31.1*   * 135* 138*   MCV 96.3 94.9 97.2   MCH 32.1* 31.3* 31.3*   MCHC 33.3 33.0 32.2   RDW 13.9 13.9 14.4   SEGSPCT 79.4* 86.6* 63.0   BANDSPCT  --   --  18*      BMP:  Recent Labs     11/16/21  0520 11/17/21  0420 11/18/21  0600    141 145   K 4.0 4.1 3.5    104 106   CO2 26 28 30   BUN 61* 55* 41*   CREATININE 0.9 0.9 0.8*   CALCIUM 9.1 8.8 8.8   GLUCOSE 135* 134* 106*      ABG:  Recent Labs     11/16/21  1115 11/17/21  1000   PH 7.34 7.50*   PO2ART 73* 80   RZM2XRH 57.0* 41.0   O2SAT 94.6* 95.1*     BNP  No results found for: BNP   D-Dimer:  Lab Results   Component Value Date    DDIMER 837 (H) 10/29/2021      1. Radiology: None      Assessment/Plan     Patient Active Problem List    Diagnosis Date Noted    Acute respiratory failure with hypoxia ([address] 11/16/2021    Cardiac arrest with ventricular fibrillation ([address] 11/16/2021    Septic shock ([address] 11/16/2021    Hematuria 11/16/2021    Pneumonia due to COVID-19 virus 10/27/2021   s/p In hospital Cardiac arrest  VDRF  Acute Hypoxic resp failure- worsened  Bilateral Pneumonia sec to COVID-19  Obesity  MARIA ELENA- improved       1. Abx  2. F/u C&S  3. Decadron  4. Insulin  5. Inhalers  6. Keep sats > 88%  7. Prone ventilation as tolerated  8. CPT  9. CXR in am  10. Tube feeds  11. Prognosis guarded  12.  C/w present management    Electronically signed by Karri Granados MD on 11/18/2021 at 11:34 AM

## 2021-11-18 NOTE — PROGRESS NOTES
Comprehensive Nutrition Assessment    Type and Reason for Visit:  Reassess    Nutrition Recommendations/Plan:   · Restart the EN of peptide based    Nutrition Assessment:  PT EN was held for a residual over 100 mL. Please do not hold the EN for any residual unless it is over 500 mL and/or the pt is showing symptoms of intolerance such as distended abdomen, diarrhea, severe constipation. Malnutrition Assessment:  Malnutrition Status:  Severe malnutrition    Context:  Acute Illness     Findings of the 6 clinical characteristics of malnutrition:  Energy Intake:  7 - 50% or less of estimated energy requirements for 5 or more days  Weight Loss:  7 - Greater than 2% over 1 week     Body Fat Loss:  Unable to assess     Muscle Mass Loss:  Unable to assess    Fluid Accumulation:  No significant fluid accumulation Generalized   Strength:  Not Performed    Estimated Daily Nutrient Needs:  Energy (kcal):  6440 based on [place] stated 2010; Weight Used for Energy Requirements:  Current     Protein (g):  146+ (2+ g/kg IBW);  Weight Used for Protein Requirements:  Ideal        Fluid (ml/day):  2200; Method Used for Fluid Requirements:  1 ml/kcal      Nutrition Related Findings:  Meds: vasopressin, versed, and fentanyl ggt, reglan      Wounds:  None       Current Nutrition Therapies:    No diet orders on file    Anthropometric Measures:  · Height: 5' 9.02\" (175.3 cm)  · Current Body Weight: 245 lb 6 oz (111.3 kg)   · Admission Body Weight: 242 lb 15.2 oz (110.2 kg) (first measured weight)    · Usual Body Weight: 254 lb (115.2 kg)     · Ideal Body Weight: 160 lbs; % Ideal Body Weight 153.4 %   · BMI: 36.2  · BMI Categories: Obese Class 2 (BMI 35.0 -39.9)       Nutrition Diagnosis:   · Inadequate oral intake related to acute injury/trauma as evidenced by NPO or clear liquid status due to medical condition      Nutrition Interventions:   Food and/or Nutrient Delivery:  Modify Tube Feeding  Nutrition Education/Counseling:  No recommendation at this time   Coordination of Nutrition Care:  Continue to monitor while inpatient    Goals:  Pt will meet greater than 75% of estimated nutrient needs via EN       Nutrition Monitoring and Evaluation:   Behavioral-Environmental Outcomes:  None Identified   Food/Nutrient Intake Outcomes:  Enteral Nutrition Intake/Tolerance  Physical Signs/Symptoms Outcomes:  Biochemical Data, GI Status, Hemodynamic Status, Fluid Status or Edema, Weight, Skin     Discharge Planning:     Too soon to determine     Electronically signed by Brandi Vail RD, LD on 92/39/09 at 8:41 PM EST    Contact: [phone]

## 2021-11-18 NOTE — PROGRESS NOTES
11/18/21 1226   Vent Information   Equipment Changed HME   Vent Type 840   Vent Mode AC/PC   Pressure Ordered 35   Rate Set 26 bmp   Peak Flow 50 L/min   FiO2  80 %   SpO2 91 %   SpO2/FiO2 ratio 113.75   Sensitivity 3   PEEP/CPAP 12   Humidification Source HME   Vent Patient Data   Peak Inspiratory Pressure 47 cmH2O   Mean Airway Pressure 24 cmH20   Rate Measured 28 br/min   Vt Exhaled 602 mL   Minute Volume 12.6 Liters   I:E Ratio 1:1.9   Spontaneous Breathing Trial (SBT) RT Doc   Pulse 87   Breath Sounds   Right Upper Lobe Diminished   Right Middle Lobe Diminished   Right Lower Lobe Diminished   Left Upper Lobe Diminished   Left Lower Lobe Diminished   Additional Respiratory  Assessments   Resp 27   Position Semi-Conteh's   Alarm Settings   High Pressure Alarm 50 cmH2O   Delay Alarm 20 sec(s)   Low Minute Volume Alarm 2.5 L/min   Apnea (secs) 20 secs   High Respiratory Rate 45 br/min   Low Exhaled Vt  250 mL   ETT (adult)   Placement Date/Time: 11/08/21 1122   Preoxygenation: Yes  Mask Ventilation: Ventilated by mask (1)  Technique: Direct laryngoscopy  Type: Cuffed  Tube Size: 8 mm  Laryngoscope: Mac  Blade Size: 3  Location: Oral  Insertion attempts: 1  Placement Verif. ..    Secured at 24 cm   Measured From Lips   ET Placement Right   Secured By Commercial tube culver   Site Condition Dry

## 2021-11-18 NOTE — PROGRESS NOTES
11/18/21 1618   Vent Information   Vent Type 840   Vent Mode AC/PC   Pressure Ordered 35   Rate Set 26 bmp   Peak Flow 50 L/min   FiO2  80 %   SpO2 94 %   SpO2/FiO2 ratio 117.5   Sensitivity 3   PEEP/CPAP 12   Humidification Source HME   Vent Patient Data   Peak Inspiratory Pressure 48 cmH2O   Mean Airway Pressure 24 cmH20   Rate Measured 26 br/min   Vt Exhaled 474 mL   Minute Volume 12.4 Liters   I:E Ratio 1:1.9   Plateau Pressure 20 DLH05   Static Compliance 35 mL/cmH2O   Total PEEP 18 cmH20   Auto PEEP 6 cmH20   Cough/Sputum   Sputum How Obtained Endotracheal; Suctioned   $Obtained Sample $Induced Sputum   Cough Non-productive   Frequency Infrequent   Sputum Amount None   Sputum Color None   Tenacity None   Spontaneous Breathing Trial (SBT) RT Doc   Pulse 86   Breath Sounds   Right Upper Lobe Diminished   Right Middle Lobe Diminished   Right Lower Lobe Diminished   Left Upper Lobe Diminished   Left Lower Lobe Diminished   Additional Respiratory  Assessments   Resp 21   Position Semi-Conteh's   Oral Care Mouth suctioned   Alarm Settings   High Pressure Alarm 50 cmH2O   Delay Alarm 20 sec(s)   Low Minute Volume Alarm 2.5 L/min   Apnea (secs) 20 secs   High Respiratory Rate 45 br/min   Low Exhaled Vt  250 mL   ETT (adult)   Placement Date/Time: 11/08/21 1122   Preoxygenation: Yes  Mask Ventilation: Ventilated by mask (1)  Technique: Direct laryngoscopy  Type: Cuffed  Tube Size: 8 mm  Laryngoscope: Mac  Blade Size: 3  Location: Oral  Insertion attempts: 1  Placement Verif. ..    Secured at 24 cm   Measured From Lips   ET Placement Right   Secured By Commercial tube culver   Site Condition Dry

## 2021-11-18 NOTE — PROGRESS NOTES
Hospitalist Progress Note      PCP: Harman Moran MD    Date of Admission: 10/26/2021    LOS: 25      Case Summary:   24-year-old gentleman with history of hypertension, hyperlipidemia admitted with acute respiratory failure with hypoxia due to COVID-19 pneumonia  Admission course was complicated by V. fib arrest.  He had retained Gómez catheter with hematuria and urology was consulted  CT abdomen pelvis was noted for Gómez catheter within the penile urethra with balloon inflated. The balloon was punctured with a needle and Gómez removed. Active Hospital Problems    Diagnosis Date Noted    Acute respiratory failure with hypoxia ([address] [J96.01] 11/16/2021    Cardiac arrest with ventricular fibrillation ([address] [I46.9, I49.01] 11/16/2021    Septic shock (HCC) [A41.9, R65.21] 11/16/2021    Hematuria [R31.9] 11/16/2021    Pneumonia due to COVID-19 virus [U07.1, J12.82] 10/27/2021         Principal Problem:    Pneumonia due to COVID-19 virus and Acute respiratory failure with hypoxia: He remains with tenuous respiratory status. Still on high PEEP and FiO2. Poor progress. Patient completed baricitinib  -Continue empiric antibiotics cefepime and vancomycin  -Continue dexamethasone  -Vent management per pulmonology      Septic shock [facility]): In the setting of Covid infection. This was complicated by cardiac arrest.  Vasopressin stopped yesterday. Stable BP  -Continue antibiotics  -Monitor hemodynamics    Active Problems:    Cardiac arrest with ventricular fibrillation ([address]): With nonsustained VT. Cardiology was consulted noted for nonsustained wide-complex tachycardia in the setting of hypoxia.  -Continue supportive care per cardiology  -Monitor renal function and electrolytes. -As needed beta-blockers for arrhythmia      Hematuria    Hyperlipidemia    Tube feeds: Tube feeds at 10 cc/h. No reported residuals this morning. We will continue to monitor for residuals with periodical holding of feeds.   Continue on Reglan    Social: Given poor progress. Will consult with palliative to help address goals of care and CODE STATUS    Medications:  Reviewed  Infusion Medications    vasopressin (Septic Shock) infusion Stopped (11/17/21 1110)    midazolam 7 mg/hr (11/18/21 0148)    fentaNYL 175 mcg/hr (11/18/21 0824)    sodium chloride       Scheduled Medications    ceftazidime-acibactam (AVYCAZ) infusion  2.5 g IntraVENous Q8H    minocycline  100 mg Oral BID    lidocaine   Topical Once    dexamethasone  6 mg IntraVENous Daily    ipratropium  4 puff Inhalation Q4H WA    tamsulosin  0.4 mg Oral Daily    metoclopramide  10 mg IntraVENous Q8H    pantoprazole  40 mg IntraVENous Daily    atorvastatin  10 mg Oral Daily    sodium chloride flush  5-40 mL IntraVENous 2 times per day    enoxaparin  30 mg SubCUTAneous BID     PRN Meds: magnesium sulfate, sodium chloride flush, ALPRAZolam, sodium chloride, sodium chloride flush, ondansetron **OR** ondansetron, polyethylene glycol, acetaminophen **OR** acetaminophen      DVT Prophylaxis: Subcut enoxaparin  Diet: Diet NPO  ADULT TUBE FEEDING; Nasogastric; Peptide Based; Continuous; 10; No; 30; Q 6 hours  Code Status: Full Code    Dispo: Remains critical in ICU. Pt has a high probability of imminent or life-threatening deterioration requiring close monitoring, and highly complex decision-making and/or interventions of high intensity to assess, manipulate, and support his critical organ systems to prevent a likely inevitable decline which could occur if left untreated. 33 minutes of critical care time spent.      ____________________________________________________________________________    Subjective:   Overnight Events:   Still with high FiO2 on pressure support  Seen with daughter at bedside.   Questions addressed      Physical Exam:  /60   Pulse 87   Temp 98.4 °F (36.9 °C) (Rectal)   Resp 27   Ht 5' 9.02\" (1.753 m)   Wt 231 lb 0.7 oz (104.8 kg)   SpO2 91%   BMI 34.10 kg/m²   General appearance: No apparent distress, appears stated age and cooperative. HEENT: Normocephalic, atraumatic, MMM, No sclera icterus/conjuctival palor. Remains intubated  Neck: Supple, no thyromegally. No jugular venous distention. Respiratory:  Normal respiratory effort. Clear to auscultation, on the vent  Cardiovascular: S1/S2 without murmurs, rubs or gallops. RRR  Abdomen: Soft, non-tender, non-distended, bowel sounds present. Musculoskeletal: No clubbing, cyanosis or edema bilaterally. Skin: Skin color, texture, turgor normal.  No rashes or lesions. Neurologic: Intubated and sedated on the vent      Intake/Output Summary (Last 24 hours) at 11/18/2021 1310  Last data filed at 11/18/2021 0930  Gross per 24 hour   Intake 396.3 ml   Output 2150 ml   Net -1753.7 ml       Labs:   Recent Labs     11/16/21  0520 11/17/21  0420 11/18/21  0600   WBC 7.0 9.4 9.2   HGB 9.6* 9.2* 10.0*   HCT 28.8* 27.9* 31.1*   * 135* 138*      Recent Labs     11/16/21  0520 11/17/21  0420 11/18/21  0600    141 145   K 4.0 4.1 3.5    104 106   CO2 26 28 30   BUN 61* 55* 41*   CREATININE 0.9 0.9 0.8*   CALCIUM 9.1 8.8 8.8   PHOS  --   --  2.8   AST 17 22 33   ALT 17 21 27   BILITOT 0.6 0.8 1.2*   ALKPHOS 64 71 82     No results for input(s): CKTOTAL, TROPONINI in the last 72 hours. Urinalysis:  No results found for: Christiano Yumiko, BACTERIA, RBCUA, BLOODU, [address], [address]    Radiology:  XR CHEST PORTABLE   Final Result   Again identified are diffuse multifocal infiltrates throughout the lungs   bilaterally with a similar appearance. XR CHEST PORTABLE   Final Result   1. Stable lines, tubes, and support devices. 2. Unchanged patchy ground-glass opacities with small effusions.          XR ABDOMEN (2 VIEWS)   Final Result   No evidence for bowel obstruction      Enteric catheter coiled in fundus of stomach         XR CHEST PORTABLE   Final Result   Redemonstration of diffuse bilateral interstitial and airspace opacities,   which may reflect multifocal pneumonia or pulmonary edema, appearing grossly   similar to the prior study. Supporting devices as above, appearing grossly similar to the prior study. XR CHEST PORTABLE   Final Result   Interval worsening. Severe diffuse infiltrates and/or congestion identified   in the lungs. XR CHEST PORTABLE   Final Result   Multifocal opacities are redemonstrated. The apparent worsening of the right   lung may be due to decreased inflation. XR CHEST PORTABLE   Final Result   Stable chest x-ray with multifocal opacities. Lines and tubes are acceptable. XR CHEST PORTABLE   Final Result   Similar appearance of extensive bilateral airspace opacities,      Endotracheal tube in appropriate position in the midthoracic trachea. Remaining tubes and lines are also unchanged. XR CHEST PORTABLE   Final Result   No change in life support. Moderate to severe bilateral airspace disease, similar to the prior exam.         XR ABDOMEN FOR NG/OG/NE TUBE PLACEMENT   Final Result   Nasogastric tube tip is in the stomach         XR CHEST PORTABLE   Final Result   Endotracheal tube with the tip at T4. New right internal jugular central venous line with the tip in the superior   vena cava. No pneumothorax. Bilateral pulmonary opacities are unchanged. CT PELVIS WO CONTRAST Additional Contrast? None   Final Result   1. A Gómez catheter has been pulled back with the balloon portion inflated in   the penile urethra. Repositioning is recommended. The catheter is not   within the bladder. XR CHEST PORTABLE   Final Result   1. Bilateral lung infiltrates, compatible with pneumonia. 2. Trace right apical pneumothorax. 3. Pneumomediastinum. 4. No left pneumothorax. XR ABDOMEN (KUB) (SINGLE AP VIEW)   Final Result   1. No evidence of bowel obstruction. 2. Suspected bilateral nephrolithiasis. XR CHEST PORTABLE   Final Result   1. Diffuse bilateral airspace opacities without significant change. 2. Trace biapical pneumothoraces pneumomediastinum and pneumopericardium with   subcutaneous emphysematous changes involving the base the neck. No   significant change. XR CHEST PORTABLE   Final Result   1. Extensive bilateral airspace opacities without significant change. 2. Pneumomediastinum, pneumopericardium, and trace biapical pneumothoraces   without significant change. XR CHEST PORTABLE   Final Result   Patchy airspace opacities bilaterally, may be related to pulmonary edema   versus pneumonia. Stable pneumomediastinum      No definite pneumothorax. XR CHEST PORTABLE   Final Result   New pneumomediastinum, subcutaneous emphysema and trace right apical   pneumothorax. XR CHEST PORTABLE   Final Result   1. Stable diffuse airspace opacities. XR CHEST PORTABLE   Final Result   Findings most consistent with severe bilateral COVID pneumonia. This appears   progressed since the recent prior CT exam.         CTA PULMONARY W CONTRAST   Final Result   1. No pulmonary embolus. 2. Pulmonary opacities compatible with viral pneumonia. 3. Reactive mediastinal and hilar lymphadenopathy. XR CHEST PORTABLE    (Results Pending)           Maico Rodrigues MD      Please excuse brevity and/or typos. This report was transcribed using voice recognition software. Every effort was made to ensure accuracy, however, inadvertent computerized transcription errors may be present.

## 2021-11-18 NOTE — PROGRESS NOTES
11/18/21 0935   Vent Information   $Ventilation $Subsequent Day   Vent Type 840   Vent Mode AC/PC   Pressure Ordered 35   Rate Set 26 bmp   Peak Flow 50 L/min   FiO2  80 %   SpO2 96 %   SpO2/FiO2 ratio 120   Sensitivity 3   PEEP/CPAP 12   Humidification Source HME   Nitric Oxide/Epoprostenol In Use? No   Vent Patient Data   Peak Inspiratory Pressure 47 cmH2O   Mean Airway Pressure 24 cmH20   Rate Measured 27 br/min   Vt Exhaled 537 mL   Minute Volume 13.9 Liters   I:E Ratio 1:1.9   Plateau Pressure 23 ZEE50   Static Compliance 42 mL/cmH2O   Total PEEP 14 cmH20   Auto PEEP 1.5 cmH20   Cough/Sputum   Sputum How Obtained Endotracheal; Suctioned   $Obtained Sample $Induced Sputum   Cough Non-productive   Frequency Infrequent   Sputum Amount None   Sputum Color None   Tenacity None   Spontaneous Breathing Trial (SBT) RT Doc   Pulse 83   Breath Sounds   Right Upper Lobe Diminished   Right Middle Lobe Diminished   Right Lower Lobe Diminished   Left Upper Lobe Diminished   Left Lower Lobe Diminished   Additional Respiratory  Assessments   Resp 27   Position Semi-Conteh's   Oral Care Mouth suctioned   Alarm Settings   High Pressure Alarm 50 cmH2O   Delay Alarm 20 sec(s)   Low Minute Volume Alarm 2.5 L/min   Apnea (secs) 20 secs   High Respiratory Rate 45 br/min   Low Exhaled Vt  250 mL   ETT (adult)   Placement Date/Time: 11/08/21 1122   Preoxygenation: Yes  Mask Ventilation: Ventilated by mask (1)  Technique: Direct laryngoscopy  Type: Cuffed  Tube Size: 8 mm  Laryngoscope: Mac  Blade Size: 3  Location: Oral  Insertion attempts: 1  Placement Verif. ..    Secured at 24 cm   Measured From Lips   ET Placement Right   Secured By Commercial tube culver   Site Condition Dry

## 2021-11-18 NOTE — PROGRESS NOTES
Infectious Disease Progress Note  2021   Patient Name: Sarah Bush : 1955       Reason for visit: F/u COVID-19 pneumonia, acute respiratory failure   History: Interval history noted  Intubated, sedated and on mechanical ventilation  Physical Exam:  Vital Signs: /60   Pulse 87   Temp 98.4 °F (36.9 °C) (Rectal)   Resp 27   Ht 5' 9.02\" (1.753 m)   Wt 231 lb 0.7 oz (104.8 kg)   SpO2 91%   BMI 34.10 kg/m²     Gen: Intubated and on mechanical ventilation  Skin: no stigmata of endocarditis  Wounds: C/D/I  HEMT: AT/NC ETT and OGT  Eyes: PERRLA, EOMI, conjunctiva pink, sclera anicteric. Neck: Supple. Trachea midline. No LAD. Chest: Transmitted breath sounds  Heart: RRR and no MRG. Abd: soft, non-distended, no tenderness, no hepatomegaly. Normoactive bowel sounds. Ext: no clubbing, cyanosis, or edema  Catheter Site: without erythema or tenderness  LDA: CVC: Right internal jugular; urethral catheter placed on 2021  Neuro: Intubated on mechanical ventilation       Radiologic / Imaging / TESTING  ONE XRAY VIEW OF THE CHEST       2021 6:51 am       COMPARISON:   11/15/2021       HISTORY:   ORDERING SYSTEM PROVIDED HISTORY: Hypoxia   TECHNOLOGIST PROVIDED HISTORY:   Reason for exam:->Hypoxia   Reason for Exam: Hypoxia   Acuity: Unknown   Type of Exam: Unknown       FINDINGS:   The endotracheal tube appears in similar position just above the thoracic   inlet. The enteric catheter is coiled in the gastric fundus. A right-sided   small bore central venous catheter projects over the superior vena cava. Cardiac mediastinal silhouettes appear stable. Extensive multifocal   infiltrates are seen throughout the lungs bilaterally. Osseous structures   appear similar. Impression   Again identified are diffuse multifocal infiltrates throughout the lungs   bilaterally with a similar appearance.           Labs:    Recent Results (from the past 24 hour(s))   Comprehensive metabolic panel    Collection Time: 11/18/21  6:00 AM   Result Value Ref Range    Sodium 145 135 - 145 MMOL/L    Potassium 3.5 3.5 - 5.1 MMOL/L    Chloride 106 99 - 110 mMol/L    CO2 30 21 - 32 MMOL/L    BUN 41 (H) 6 - 23 MG/DL    CREATININE 0.8 (L) 0.9 - 1.3 MG/DL    Glucose 106 (H) 70 - 99 MG/DL    Calcium 8.8 8.3 - 10.6 MG/DL    Albumin 2.7 (L) 3.4 - 5.0 GM/DL    Total Protein 5.1 (L) 6.4 - 8.2 GM/DL    Total Bilirubin 1.2 (H) 0.0 - 1.0 MG/DL    ALT 27 10 - 40 U/L    AST 33 15 - 37 IU/L    Alkaline Phosphatase 82 40 - 128 IU/L    GFR Non-African American >60 >60 mL/min/1.73m2    GFR African American >60 >60 mL/min/1.73m2    Anion Gap 9 4 - 16   CBC auto differential    Collection Time: 11/18/21  6:00 AM   Result Value Ref Range    WBC 9.2 4.0 - 10.5 K/CU MM    RBC 3.20 (L) 4.6 - 6.2 M/CU MM    Hemoglobin 10.0 (L) 13.5 - 18.0 GM/DL    Hematocrit 31.1 (L) 42 - 52 %    MCV 97.2 78 - 100 FL    MCH 31.3 (H) 27 - 31 PG    MCHC 32.2 32.0 - 36.0 %    RDW 14.4 11.7 - 14.9 %    Platelets 784 (L) 734 - 440 K/CU MM    MPV 11.4 (H) 7.5 - 11.1 FL    Metamyelocytes Relative 2 (H) 0.0 %    Bands Relative 18 (H) 5 - 11 %    Segs Relative 63.0 36 - 66 %    Eosinophils % 8.0 (H) 0 - 3 %    Lymphocytes % 4.0 (L) 24 - 44 %    Monocytes % 5.0 (H) 0 - 4 %    Metamyelocytes Absolute 0.18 K/CU MM    Bands Absolute 1.66 K/CU MM    Segs Absolute 5.8 K/CU MM    Eosinophils Absolute 0.7 K/CU MM    Lymphocytes Absolute 0.4 K/CU MM    Monocytes Absolute 0.5 K/CU MM    Differential Type MANUAL DIFFERENTIAL     Anisocytosis 1+     Polychromasia 1+     Toxic Granulation PRESENT     PLT Morphology SEVERAL LARGE PLATELETS    Magnesium    Collection Time: 11/18/21  6:00 AM   Result Value Ref Range    Magnesium 2.0 1.8 - 2.4 mg/dl   C-Reactive Protein    Collection Time: 11/18/21  6:00 AM   Result Value Ref Range    CRP, High Sensitivity 248.1 mg/L   Phosphorus    Collection Time: 11/18/21  6:00 AM   Result Value Ref Range    Phosphorus 2.8 2.5 - 4.9 MG/DL Triglycerides    Collection Time: 21  6:00 AM   Result Value Ref Range    Triglycerides 242 (H) <150 MG/DL     CULTURE results: Invalid input(s): BLOOD CULTURE,  URINE CULTURE, SURGICAL CULTURE    Diagnosis:  Patient Active Problem List   Diagnosis    Pneumonia due to COVID-19 virus    Acute respiratory failure with hypoxia ([address]    Cardiac arrest with ventricular fibrillation ([address]    Septic shock (HCC)    Hematuria       Active Problems  Principal Problem:    Pneumonia due to COVID-19 virus  Active Problems:    Acute respiratory failure with hypoxia (HCC)    Cardiac arrest with ventricular fibrillation ([address]    Septic shock ([address]    Hematuria  Resolved Problems:    * No resolved hospital problems. *      Impression and plan  Summary and rationale: Patient is a 78 y.o.  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 Unvaccinated for COVID-19. 1 weeks hx of SOB, fever and chills. Diagnosed witih severe covid-19. Intubated and on mechanical ventilator. COVID-19 symptom onset: 10/21/2021  COVID-19 test date: 10/26/2021  Expected discontinuation of isolation precautions: 11/10/2021  Clinical status: day 29 of disease, in hyperinflammatory phase. worsening, continues to have fever.    Therapeutic:  Ongoing antibiotics: vancomycin, Avycaz and minocycline  Immunomodulators:   Dexamethasone:   Start Solumedrol infusion at 1mg/kg/day for 7 days, and then taper downward  Baracitinib: 14 day course completed on 11/10/2021  IVI g IV daily for 3 days  Completed antibiotics:   Other agents:   Diagnostic: Trend CRP  F/u:  Other:      Electronically signed by: Electronically signed by Gavin Foster MD on 2021 at 1:14 PM

## 2021-11-19 NOTE — PROGRESS NOTES
Pulmonary and Critical Care  Progress Note      VITALS:  /68   Pulse 78   Temp 97.7 °F (36.5 °C) (Rectal)   Resp 26   Ht 5' 9.02\" (1.753 m)   Wt 231 lb 0.7 oz (104.8 kg)   SpO2 94%   BMI 34.10 kg/m²     Subjective:   Chief complaint: Bilateral COVID-19 pneumonia, acute hypoxemic respiratory failure, obesity, in-hospital cardiac arrest, ventilator dependent respiratory failure  Moderate resp distress  Remains on assist control rate of 24  Patient well sedated on fentanyl and Versed drip    Objective:   PHYSICAL EXAM:    LUNGS: Breath sounds are coarse bilaterally with a few scattered rhonchi. Serum sodium 145 potassium 4.0 creatinine 0.8  WBC 6.9 with hemoglobin 9.6  Procalcitonin 0.28    Arterial blood gases on AC/PC, pressure control 35 respiratory rate 26  80% FiO2 with PEEP of 12 with pH 7.45, PCO2 47, PO2 82      DATA:    CBC:  Recent Labs     11/17/21  0420 11/18/21  0600 11/19/21  0600   WBC 9.4 9.2 6.9   RBC 2.94* 3.20* 3.06*   HGB 9.2* 10.0* 9.6*   HCT 27.9* 31.1* 29.5*   * 138* 137*   MCV 94.9 97.2 96.4   MCH 31.3* 31.3* 31.4*   MCHC 33.0 32.2 32.5   RDW 13.9 14.4 14.6   SEGSPCT 86.6* 63.0  --    BANDSPCT  --  18*  --       BMP:  Recent Labs     11/17/21  0420 11/18/21  0600 11/19/21  0600    145 145   K 4.1 3.5 4.0    106 108   CO2 28 30 27   BUN 55* 41* 46*   CREATININE 0.9 0.8* 0.8*   CALCIUM 8.8 8.8 8.9   GLUCOSE 134* 106* 171*      ABG:  Recent Labs     11/16/21  1115 11/17/21  1000 11/19/21  0600   PH 7.34 7.50* 7.45   PO2ART 73* 80 82   YZC0SDG 57.0* 41.0 47.0*   O2SAT 94.6* 95.1* 95.0*     BNP  No results found for: BNP   D-Dimer:  Lab Results   Component Value Date    DDIMER 837 (H) 10/29/2021      1.  Radiology: Chest x-ray this a.m. reviewed  The right costophrenic angle is excluded from the exam.  Cardiomediastinal   silhouette is stable.  Similar position of devices.  Similar bilateral   airspace opacities.  No pleural effusion or pneumothorax.  No gross bony   abnormality. Impression:     Stable chest.          Assessment:     Patient Active Problem List   Diagnosis    Pneumonia due to COVID-19 virus    Acute respiratory failure with hypoxia ([address]    Cardiac arrest with ventricular fibrillation ([address]    Septic shock (HCC)    Hematuria       Plan:   1. continue present treatment  2. We will reduce AC rate to 20 and FiO2 to 75% as tolerated to keep O2 saturation greater than 90%  3.  Reviewed ID note and no changes in his IV steroids baricitinib and IVIG    Dina Patel MD MD  11/19/2021  9:46 AM

## 2021-11-19 NOTE — PROGRESS NOTES
11/19/21 0806   Vent Information   $Ventilation $Subsequent Day   Equipment Changed HME   Vent Type 840   Vent Mode AC/PC   Pressure Ordered 35   Rate Set 26 bmp   Peak Flow 50 L/min   FiO2  80 %   SpO2 94 %   SpO2/FiO2 ratio 117.5   Sensitivity 3   PEEP/CPAP 12   I Time/ I Time % 0.8 s   Humidification Source HME   Vent Patient Data   High Peep/I Pressure 26   Peak Inspiratory Pressure 47 cmH2O   Mean Airway Pressure 24 cmH20   Rate Measured 26 br/min   Vt Exhaled 503 mL   Minute Volume 13.1 Liters   I:E Ratio 1:1.9   Plateau Pressure 39 ADJ56   Static Compliance 28 mL/cmH2O   Total PEEP 14 cmH20   Auto PEEP 1.4 cmH20   Cough/Sputum   Sputum How Obtained Endotracheal; Suctioned   $Obtained Sample $Induced Sputum   Sputum Amount Moderate   Sputum Color Creamy   Tenacity Thick   Spontaneous Breathing Trial (SBT) RT Doc   Pulse 78   Additional Respiratory  Assessments   Resp 27   Alarm Settings   High Pressure Alarm 50 cmH2O   Low Minute Volume Alarm 2.5 L/min   Apnea (secs) 20 secs   High Respiratory Rate 45 br/min   Low Exhaled Vt  250 mL   ETT (adult)   Placement Date/Time: 11/08/21 1122   Preoxygenation: Yes  Mask Ventilation: Ventilated by mask (1)  Technique: Direct laryngoscopy  Type: Cuffed  Tube Size: 8 mm  Laryngoscope: Mac  Blade Size: 3  Location: Oral  Insertion attempts: 1  Placement Verif. ..    Secured at 24 cm   Measured From Lips   ET Placement Right   Secured By Commercial tube culver   Site Condition Dry

## 2021-11-19 NOTE — PROGRESS NOTES
Hospitalist Progress Note      PCP: Glendell Litten, MD    Date of Admission: 10/26/2021    LOS: 23    Subjective:   Overnight Events:   Uneventful overnight  Noted episode of PVCs with bradycardia yesterday evening though reported improved since being off propofol  Off pressors this morning          Active Hospital Problems    Diagnosis Date Noted    Acute respiratory failure with hypoxia ([address] [J96.01] 11/16/2021    Cardiac arrest with ventricular fibrillation ([address] [I46.9, I49.01] 11/16/2021    Septic shock ([address] [A41.9, R65.21] 11/16/2021    Hematuria [R31.9] 11/16/2021    Pneumonia due to COVID-19 virus [U07.1, J12.82] 10/27/2021       Case Summary: 54-year-old gentleman with history of hyperlipidemia, hypertension admitted with acute respiratory failure with hypoxia and Covid 19 Pneumonia. Admission course was complicated by V. fib arrest as well as hematuria due to Gómez balloon inflated in Penile urethra. Completed baricitinib    Principal Problem:    Pneumonia due to COVID-19 virus and Acute respiratory failure with hypoxia: Remains with tenuous respiratory status. Completed baricitinib  -Continue steroids and empiric antibiotics  -We will start winding up PEEP with target sats greater than 90%  -Pulmonology following with management of the vent        Active Problems:    Cardiac arrest with ventricular fibrillation ([address]): With nonsustained VT. Cardiology was consulted for nonsustained wide-complex tachycardia in the setting of hypoxia. Patient also had bradycardia with PVCs. Electrolytes replenished.  -Continue as needed dose of beta-blockers  -Monitor renal function and electrolytes  -Cardiology following with recommendation. Septic shock ([address]): In the setting of Covid infection complicated by cardiac arrest.  pressor support has since been discontinued since 11/17/2021. Monitor off pressors    Hyperlipidemia: On statin    Tube feeds: Patient has had residuals with intermittent.   Occult holding of feeds. Continue Reglan and tube feeds      Resolved Problems:    Hematuria          Medications:  Reviewed  Infusion Medications    IVPB builder 10.4 mL/hr at 11/18/21 1900    vasopressin (Septic Shock) infusion Stopped (11/17/21 1110)    midazolam 6 mg/hr (11/19/21 1044)    fentaNYL 150 mcg/hr (11/19/21 1043)    sodium chloride       Scheduled Medications    metoprolol  5 mg IntraVENous Once    methylPREDNISolone  70 mg IntraVENous Daily    immune globulin  40 g IntraVENous Daily    ceftazidime-acibactam (AVYCAZ) infusion  2.5 g IntraVENous Q8H    minocycline  100 mg Oral BID    lidocaine   Topical Once    ipratropium  4 puff Inhalation Q4H WA    tamsulosin  0.4 mg Oral Daily    metoclopramide  10 mg IntraVENous Q8H    pantoprazole  40 mg IntraVENous Daily    atorvastatin  10 mg Oral Daily    sodium chloride flush  5-40 mL IntraVENous 2 times per day    enoxaparin  30 mg SubCUTAneous BID     PRN Meds: magnesium sulfate, sodium chloride flush, ALPRAZolam, sodium chloride, sodium chloride flush, ondansetron **OR** ondansetron, polyethylene glycol, acetaminophen **OR** acetaminophen      DVT Prophylaxis: Subcut enoxaparin  Diet: Diet NPO  ADULT TUBE FEEDING; Nasogastric; Peptide Based; Continuous; 10; No; 30; Q 6 hours  Code Status: Full Code    Dispo: [facility] critical in ICU    ____________________________________________________________________________    Physical Exam:  /68   Pulse 95   Temp 97.7 °F (36.5 °C) (Rectal)   Resp 21   Ht 5' 9.02\" (1.753 m)   Wt 231 lb 0.7 oz (104.8 kg)   SpO2 91%   BMI 34.10 kg/m²   General appearance: No apparent distress, appears stated age. Intubated and sedated. HEENT: Normocephalic, atraumatic, MMM, No sclera icterus/conjuctival palor  Neck: Supple, no thyromegally. No jugular venous distention. Respiratory:  Normal respiratory effort. Clear to auscultation, vent  Cardiovascular: S1/S2 without murmurs, rubs or gallops.  RRR  Abdomen: Soft, non-tender, non-distended, bowel sounds present. Musculoskeletal: No clubbing, cyanosis or edema bilaterally. Skin: Skin color, texture, turgor normal.  No rashes or lesions. Neurologic: Intubated and sedated      Intake/Output Summary (Last 24 hours) at 11/19/2021 1218  Last data filed at 11/19/2021 1973  Gross per 24 hour   Intake 721.37 ml   Output 1875 ml   Net -1153.63 ml       Labs:   Recent Labs     11/17/21  0420 11/18/21  0600 11/19/21  0600   WBC 9.4 9.2 6.9   HGB 9.2* 10.0* 9.6*   HCT 27.9* 31.1* 29.5*   * 138* 137*      Recent Labs     11/17/21  0420 11/18/21  0600 11/19/21  0600    145 145   K 4.1 3.5 4.0    106 108   CO2 28 30 27   BUN 55* 41* 46*   CREATININE 0.9 0.8* 0.8*   CALCIUM 8.8 8.8 8.9   PHOS  --  2.8 3.0   AST 22 33 18   ALT 21 27 22   BILITOT 0.8 1.2* 0.5   ALKPHOS 71 82 79     No results for input(s): CKTOTAL, TROPONINI in the last 72 hours. Urinalysis:  No results found for: Kyler Scarce, BACTERIA, RBCUA, BLOODU, [address], [address]    Radiology:  XR CHEST PORTABLE   Final Result   Stable chest.         XR CHEST PORTABLE   Final Result   Again identified are diffuse multifocal infiltrates throughout the lungs   bilaterally with a similar appearance. XR CHEST PORTABLE   Final Result   1. Stable lines, tubes, and support devices. 2. Unchanged patchy ground-glass opacities with small effusions. XR ABDOMEN (2 VIEWS)   Final Result   No evidence for bowel obstruction      Enteric catheter coiled in fundus of stomach         XR CHEST PORTABLE   Final Result   Redemonstration of diffuse bilateral interstitial and airspace opacities,   which may reflect multifocal pneumonia or pulmonary edema, appearing grossly   similar to the prior study. Supporting devices as above, appearing grossly similar to the prior study. XR CHEST PORTABLE   Final Result   Interval worsening.   Severe diffuse infiltrates and/or congestion identified   in the lungs.         XR CHEST PORTABLE   Final Result   Multifocal opacities are redemonstrated. The apparent worsening of the right   lung may be due to decreased inflation. XR CHEST PORTABLE   Final Result   Stable chest x-ray with multifocal opacities. Lines and tubes are acceptable. XR CHEST PORTABLE   Final Result   Similar appearance of extensive bilateral airspace opacities,      Endotracheal tube in appropriate position in the midthoracic trachea. Remaining tubes and lines are also unchanged. XR CHEST PORTABLE   Final Result   No change in life support. Moderate to severe bilateral airspace disease, similar to the prior exam.         XR ABDOMEN FOR NG/OG/NE TUBE PLACEMENT   Final Result   Nasogastric tube tip is in the stomach         XR CHEST PORTABLE   Final Result   Endotracheal tube with the tip at T4. New right internal jugular central venous line with the tip in the superior   vena cava. No pneumothorax. Bilateral pulmonary opacities are unchanged. CT PELVIS WO CONTRAST Additional Contrast? None   Final Result   1. A Gómez catheter has been pulled back with the balloon portion inflated in   the penile urethra. Repositioning is recommended. The catheter is not   within the bladder. XR CHEST PORTABLE   Final Result   1. Bilateral lung infiltrates, compatible with pneumonia. 2. Trace right apical pneumothorax. 3. Pneumomediastinum. 4. No left pneumothorax. XR ABDOMEN (KUB) (SINGLE AP VIEW)   Final Result   1. No evidence of bowel obstruction. 2. Suspected bilateral nephrolithiasis. XR CHEST PORTABLE   Final Result   1. Diffuse bilateral airspace opacities without significant change. 2. Trace biapical pneumothoraces pneumomediastinum and pneumopericardium with   subcutaneous emphysematous changes involving the base the neck. No   significant change. XR CHEST PORTABLE   Final Result   1.  Extensive bilateral airspace opacities without significant change. 2. Pneumomediastinum, pneumopericardium, and trace biapical pneumothoraces   without significant change. XR CHEST PORTABLE   Final Result   Patchy airspace opacities bilaterally, may be related to pulmonary edema   versus pneumonia. Stable pneumomediastinum      No definite pneumothorax. XR CHEST PORTABLE   Final Result   New pneumomediastinum, subcutaneous emphysema and trace right apical   pneumothorax. XR CHEST PORTABLE   Final Result   1. Stable diffuse airspace opacities. XR CHEST PORTABLE   Final Result   Findings most consistent with severe bilateral COVID pneumonia. This appears   progressed since the recent prior CT exam.         CTA PULMONARY W CONTRAST   Final Result   1. No pulmonary embolus. 2. Pulmonary opacities compatible with viral pneumonia. 3. Reactive mediastinal and hilar lymphadenopathy. Maico Rankin MD      Please excuse brevity and/or typos. This report was transcribed using voice recognition software. Every effort was made to ensure accuracy, however, inadvertent computerized transcription errors may be present.

## 2021-11-19 NOTE — PROGRESS NOTES
Today's plan:  tyree LIM sign off      Admit Date:  10/26/2021    Subjective:OK      Chief complaints on admission  Chief Complaint   Patient presents with    Other     covid + low 02 at home symptoms started last thursday tested positive this weekend.  Shortness of Breath         History of present illness:Cecilio is a 78 y. o.year old who  presents with had concerns including Other (covid + low 02 at home symptoms started last thursday tested positive this weekend.) and Shortness of Breath. Past medical history:    has a past medical history of Hyperlipidemia and Hypertension. Past surgical history:   has a past surgical history that includes Ankle surgery; knee surgery; and Hand surgery. Social History:   reports that he has never smoked. He does not have any smokeless tobacco history on file. He reports current alcohol use. He reports that he does not use drugs. Family history:  family history is not on file. Allergies   Allergen Reactions    Amoxicillin          Objective:   /68   Pulse 95   Temp 97.7 °F (36.5 °C) (Rectal)   Resp 21   Ht 5' 9.02\" (1.753 m)   Wt 231 lb 0.7 oz (104.8 kg)   SpO2 91%   BMI 34.10 kg/m²       Intake/Output Summary (Last 24 hours) at 11/19/2021 1211  Last data filed at 11/19/2021 3140  Gross per 24 hour   Intake 721.37 ml   Output 1875 ml   Net -1153.63 ml       TELEMETRY:SINUS  Physical Exam:  Constitutional:  Well developed, Well nourished, No acute distress, Non-toxic appearance. HENT:  Normocephalic, Atraumatic, Bilateral external ears normal, Oropharynx moist, No oral exudates, Nose normal. Neck- Normal range of motion, No tenderness, Supple, No stridor. Eyes:  PERRL, EOMI, Conjunctiva normal, No discharge. Respiratory:  Normal breath sounds, No respiratory distress, No wheezing, No chest tenderness. ,no use of accessory muscles, diaphragm movement is normal  Cardiovascular: (PMI) apex non displaced,no lifts no thrills, no s3,no s4, Normal heart rate, Normal rhythm, No murmurs, No rubs, No gallops. Carotid arteries pulse and amplitude are normal no bruit, no abdominal bruit noted ( normal abdominal aorta ausculation), femoral arteries pulse and amplitude are normal no bruit, pedal pulses are normal  GI:  Bowel sounds normal, Soft, No tenderness, No masses, No pulsatile masses. : External genitalia appear normal, No masses or lesions. No discharge. No CVA tenderness. Musculoskeletal:  Intact distal pulses, No edema, No tenderness, No cyanosis, No clubbing. Good range of motion in all major joints. No tenderness to palpation or major deformities noted. Back- No tenderness. Integument:  Warm, Dry, No erythema, No rash. Lymphatic:  No lymphadenopathy noted.    Neurologic:  INTUBATED     Medications:    metoprolol  5 mg IntraVENous Once    methylPREDNISolone  70 mg IntraVENous Daily    immune globulin  40 g IntraVENous Daily    ceftazidime-acibactam (AVYCAZ) infusion  2.5 g IntraVENous Q8H    minocycline  100 mg Oral BID    lidocaine   Topical Once    ipratropium  4 puff Inhalation Q4H WA    tamsulosin  0.4 mg Oral Daily    metoclopramide  10 mg IntraVENous Q8H    pantoprazole  40 mg IntraVENous Daily    atorvastatin  10 mg Oral Daily    sodium chloride flush  5-40 mL IntraVENous 2 times per day    enoxaparin  30 mg SubCUTAneous BID      IVPB builder 10.4 mL/hr at 11/18/21 1900    vasopressin (Septic Shock) infusion Stopped (11/17/21 1110)    midazolam 6 mg/hr (11/19/21 1044)    fentaNYL 150 mcg/hr (11/19/21 1043)    sodium chloride       magnesium sulfate, sodium chloride flush, ALPRAZolam, sodium chloride, sodium chloride flush, ondansetron **OR** ondansetron, polyethylene glycol, acetaminophen **OR** acetaminophen    Lab Data:  CBC:   Recent Labs     11/17/21  0420 11/18/21  0600 11/19/21  0600   WBC 9.4 9.2 6.9   HGB 9.2* 10.0* 9.6*   HCT 27.9* 31.1* 29.5*   MCV 94.9 97.2 96.4   * 138* 137*     BMP:   Recent Labs 11/17/21  0420 11/18/21  0600 11/19/21  0600    145 145   K 4.1 3.5 4.0    106 108   CO2 28 30 27   PHOS  --  2.8 3.0   BUN 55* 41* 46*   CREATININE 0.9 0.8* 0.8*     LIVER PROFILE:   Recent Labs     11/17/21  0420 11/18/21  0600 11/19/21  0600   AST 22 33 18   ALT 21 27 22   BILITOT 0.8 1.2* 0.5   ALKPHOS 71 82 79     PT/INR: No results for input(s): PROTIME, INR in the last 72 hours. APTT: No results for input(s): APTT in the last 72 hours. BNP:  No results for input(s): BNP in the last 72 hours. TROPONIN: @TROPONINI:3@      Assessment:  78 y. o.year old who is admitted for          Plan:ADMITTED TO ICU  Covid 19 PNEUMONIA with respiratory failure intubated, and sedated,   Bradycardia\" better since propofol changed to versed  Shock\" on pressors  NSVT; resolved  All labs, medications and tests reviewed, continue all other medications of all above medical condition listed as is.       Guanakito Murillo MD, MD 11/19/2021 12:11 PM

## 2021-11-19 NOTE — CARE COORDINATION
CM reviewed notes. Pt currently remains on the vent.  [facility]

## 2021-11-20 NOTE — PROGRESS NOTES
Infectious Disease Progress Note  2021   Patient Name: Carson Gilliam : 1955       Reason for visit: F/u COVID-19 pneumonia, acute respiratory failure   History: Interval history noted  Intubated, sedated and on mechanical ventilation  Physical Exam:  Vital Signs: BP (!) 142/72   Pulse 86   Temp 98.9 °F (37.2 °C) (Rectal)   Resp 21   Ht 5' 9.02\" (1.753 m)   Wt 231 lb 0.7 oz (104.8 kg)   SpO2 90%   BMI 34.10 kg/m²     Gen: Intubated and on mechanical ventilation  Skin: no stigmata of endocarditis  Wounds: C/D/I  HEMT: AT/NC ETT and OGT  Eyes: PERRLA, EOMI, conjunctiva pink, sclera anicteric. Neck: Supple. Trachea midline. No LAD. Chest: Transmitted breath sounds  Heart: RRR and no MRG. Abd: soft, non-distended, no tenderness, no hepatomegaly. Normoactive bowel sounds. Ext: no clubbing, cyanosis, or edema  Catheter Site: without erythema or tenderness  LDA: CVC: Right internal jugular; urethral catheter placed on 2021  Neuro: Intubated on mechanical ventilation       Radiologic / Imaging / TESTING  ONE XRAY VIEW OF THE CHEST       2021 6:51 am       COMPARISON:   11/15/2021       HISTORY:   ORDERING SYSTEM PROVIDED HISTORY: Hypoxia   TECHNOLOGIST PROVIDED HISTORY:   Reason for exam:->Hypoxia   Reason for Exam: Hypoxia   Acuity: Unknown   Type of Exam: Unknown       FINDINGS:   The endotracheal tube appears in similar position just above the thoracic   inlet. The enteric catheter is coiled in the gastric fundus. A right-sided   small bore central venous catheter projects over the superior vena cava. Cardiac mediastinal silhouettes appear stable. Extensive multifocal   infiltrates are seen throughout the lungs bilaterally. Osseous structures   appear similar. Impression   Again identified are diffuse multifocal infiltrates throughout the lungs   bilaterally with a similar appearance.           Labs:    Recent Results (from the past 24 hour(s))   Comprehensive metabolic panel    Collection Time: 11/20/21  5:10 AM   Result Value Ref Range    Sodium 147 (H) 135 - 145 MMOL/L    Potassium 3.6 3.5 - 5.1 MMOL/L    Chloride 107 99 - 110 mMol/L    CO2 32 21 - 32 MMOL/L    BUN 49 (H) 6 - 23 MG/DL    CREATININE 0.9 0.9 - 1.3 MG/DL    Glucose 141 (H) 70 - 99 MG/DL    Calcium 9.0 8.3 - 10.6 MG/DL    Albumin 2.9 (L) 3.4 - 5.0 GM/DL    Total Protein 6.6 6.4 - 8.2 GM/DL    Total Bilirubin 0.5 0.0 - 1.0 MG/DL    ALT 19 10 - 40 U/L    AST 15 15 - 37 IU/L    Alkaline Phosphatase 67 40 - 128 IU/L    GFR Non-African American >60 >60 mL/min/1.73m2    GFR African American >60 >60 mL/min/1.73m2    Anion Gap 8 4 - 16   CBC auto differential    Collection Time: 11/20/21  5:10 AM   Result Value Ref Range    WBC 8.8 4.0 - 10.5 K/CU MM    RBC 3.02 (L) 4.6 - 6.2 M/CU MM    Hemoglobin 9.4 (L) 13.5 - 18.0 GM/DL    Hematocrit 29.3 (L) 42 - 52 %    MCV 97.0 78 - 100 FL    MCH 31.1 (H) 27 - 31 PG    MCHC 32.1 32.0 - 36.0 %    RDW 14.5 11.7 - 14.9 %    Platelets 695 132 - 442 K/CU MM    MPV 11.1 7.5 - 11.1 FL    Differential Type AUTOMATED DIFFERENTIAL     Segs Relative 85.4 (H) 36 - 66 %    Lymphocytes % 6.3 (L) 24 - 44 %    Monocytes % 3.6 0 - 4 %    Eosinophils % 0.0 0 - 3 %    Basophils % 0.1 0 - 1 %    Segs Absolute 7.5 K/CU MM    Lymphocytes Absolute 0.6 K/CU MM    Monocytes Absolute 0.3 K/CU MM    Eosinophils Absolute 0.0 K/CU MM    Basophils Absolute 0.0 K/CU MM    Nucleated RBC % 0.3 %    Total Nucleated RBC 0.0 K/CU MM    Total Immature Neutrophil 0.41 K/CU MM    Immature Neutrophil % 4.6 (H) 0 - 0.43 %   Magnesium    Collection Time: 11/20/21  5:10 AM   Result Value Ref Range    Magnesium 2.0 1.8 - 2.4 mg/dl   Phosphorus    Collection Time: 11/20/21  5:10 AM   Result Value Ref Range    Phosphorus 3.1 2.5 - 4.9 MG/DL   Blood gas, arterial    Collection Time: 11/20/21  6:00 AM   Result Value Ref Range    pH, Bld 7.45 7.34 - 7.45    pCO2, Arterial 54.0 (H) 32 - 45 MMHG    pO2, Arterial 73 (L) 75 - 100 MMHG    Base Exc, Mixed 11.4 (H) 0 - 1.2    HCO3, Arterial 37.5 (H) 18 - 23 MMOL/L    CO2 Content 39.2 (H) 19 - 24 MMOL/L    O2 Sat 93.8 (L) 96 - 97 %    Carbon Monoxide, Blood 1.8 0 - 5 %    Methemoglobin, Arterial 1.3 <1.5 %    Comment 20 500 .75 P12      CULTURE results: Invalid input(s): BLOOD CULTURE,  URINE CULTURE, SURGICAL CULTURE    Diagnosis:  Patient Active Problem List   Diagnosis    Pneumonia due to COVID-19 virus    Acute respiratory failure with hypoxia (HCC)    Cardiac arrest with ventricular fibrillation (HCC)    Septic shock (HCC)    Hematuria       Active Problems  Principal Problem:    Pneumonia due to COVID-19 virus  Active Problems:    Acute respiratory failure with hypoxia (HCC)    Cardiac arrest with ventricular fibrillation (HCC)    Septic shock (HCC)    Hematuria  Resolved Problems:    * No resolved hospital problems. *      Impression and plan  Summary and rationale: Patient is a 78 y.o.  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 Unvaccinated for COVID-19. 1 weeks hx of SOB, fever and chills. Diagnosed witih severe covid-19. Intubated and on mechanical ventilator. COVID-19 symptom onset: 10/21/2021  COVID-19 test date: 10/26/2021  Expected discontinuation of isolation precautions: 11/10/2021  Clinical status: day 31 of disease, in hyperinflammatory phase.   Therapeutic:  Ongoing antibiotics: vancomycin, Avycaz and minocycline, treat for 7 days  Immunomodulators:   Dexamethasone:   Start Solumedrol infusion at 1mg/kg/day for 7 days, and then taper downward- 0.5 mg/kg/day for 7 days  Baracitinib: 14 day course completed on 11/10/2021  IVI g IV daily for 3 days (end-date: 2021)  Completed antibiotics:   Other agents:   Diagnostic: Trend CRP  F/u:  Other:      Electronically signed by: Electronically signed by Giancarlo King MD on 2021 at 2:27 PM

## 2021-11-20 NOTE — PROGRESS NOTES
11/20/21 1552   Vent Information   Equipment Changed Suction catheter   Vent Type 840   Vent Mode AC/PC   Pressure Ordered 35   Rate Set 20 bmp   Peak Flow 50 L/min   FiO2  80 %   SpO2 92 %   SpO2/FiO2 ratio 115   Sensitivity 3   PEEP/CPAP 12   Humidification Source HME   Vent Patient Data   Peak Inspiratory Pressure 48 cmH2O   Mean Airway Pressure 22 cmH20   Rate Measured 21 br/min   Spontaneous  mL   Minute Volume 11.9 Liters   I:E Ratio 1:2.8   Plateau Pressure 20 PBT16   Static Compliance 45 mL/cmH2O   Total PEEP 13 cmH20   Auto PEEP 0.1 cmH20   Cough/Sputum   Sputum How Obtained Endotracheal; Suctioned   $Obtained Sample $Induced Sputum   Cough Non-productive   Frequency Infrequent   Sputum Amount None   Sputum Color None   Tenacity None   Spontaneous Breathing Trial (SBT) RT Doc   Pulse 79   RSBI Calculated 34.6   Breath Sounds   Right Upper Lobe Diminished   Right Middle Lobe Diminished   Right Lower Lobe Diminished   Left Upper Lobe Diminished   Left Lower Lobe Diminished   Additional Respiratory  Assessments   Resp 21   Position Semi-Conteh's   Oral Care Mouth suctioned   Alarm Settings   High Pressure Alarm 50 cmH2O   Delay Alarm 20 sec(s)   Low Minute Volume Alarm 2.5 L/min   Apnea (secs) 20 secs   High Respiratory Rate 45 br/min   Low Exhaled Vt  250 mL   ETT (adult)   Placement Date/Time: 11/08/21 1122   Preoxygenation: Yes  Mask Ventilation: Ventilated by mask (1)  Technique: Direct laryngoscopy  Type: Cuffed  Tube Size: 8 mm  Laryngoscope: Mac  Blade Size: 3  Location: Oral  Insertion attempts: 1  Placement Verif. ..    Secured at 24 cm   Measured From Lips   ET Placement Right   Secured By Commercial tube culver   Site Condition Dry

## 2021-11-20 NOTE — CONSULTS
Nephrology Service Consultation    Patient:  Viry Cantrell  MRN: [id number]  Consulting physician:  Suzan Agee MD  Reason for Consult: Electrolyte abnormalities    History Obtained From:  electronic medical record  PCP: Arturo Farris MD    HISTORY OF PRESENT ILLNESS:   The patient is a 78 y.o. male who presents with shortness of breath and weakness with COVID-19 about a month ago. Presents on October 27. Currently in the Covid unit off isolation still on a ventilator noted to have increased sodium and low urine output. In the above setting renal was asked to evaluate reviewed chart and look for details of the information. Patient currently on a ventilator with tube feeding. Patient with history of hypertension hyperlipidemia had V. fib arrest during admission. Prior hematuria due to problem with a Gómez catheter.   Now renal asked evaluate above setting and reviewed chart    Past Medical History:        Diagnosis Date    Hyperlipidemia     Hypertension        Past Surgical History:        Procedure Laterality Date    ANKLE SURGERY      HAND SURGERY      KNEE SURGERY         Medications:   Scheduled Meds:   metoprolol  5 mg IntraVENous Once    methylPREDNISolone  70 mg IntraVENous Daily    immune globulin  40 g IntraVENous Daily    ceftazidime-acibactam (AVYCAZ) infusion  2.5 g IntraVENous Q8H    minocycline  100 mg Oral BID    lidocaine   Topical Once    ipratropium  4 puff Inhalation Q4H WA    tamsulosin  0.4 mg Oral Daily    metoclopramide  10 mg IntraVENous Q8H    pantoprazole  40 mg IntraVENous Daily    atorvastatin  10 mg Oral Daily    sodium chloride flush  5-40 mL IntraVENous 2 times per day    enoxaparin  30 mg SubCUTAneous BID     Continuous Infusions:   DOPamine Stopped (11/19/21 6052)    vasopressin (Septic Shock) infusion Stopped (11/17/21 1110)    midazolam 5 mg/hr (11/20/21 0842)    fentaNYL 175 mcg/hr (11/20/21 0842)    sodium chloride       PRN Meds:.magnesium sulfate, sodium chloride flush, ALPRAZolam, sodium chloride, sodium chloride flush, ondansetron **OR** ondansetron, polyethylene glycol, acetaminophen **OR** acetaminophen    Allergies:  Amoxicillin    Social History:   TOBACCO:   reports that he has never smoked. He does not have any smokeless tobacco history on file. ETOH:   reports current alcohol use. OCCUPATION:      Family History:   History reviewed. No pertinent family history. REVIEW OF SYSTEMS:  Negative except for sedated on the ventilator. Physical Exam:    I/O: 11/19 0701 - 11/20 0700  In: 2598.1 [I.V.:1472.5]  Out: 2720 [Urine:2720]    Vitals: /67   Pulse 90   Temp 98.5 °F (36.9 °C) (Rectal)   Resp 20   Ht 5' 9.02\" (1.753 m)   Wt 231 lb 0.7 oz (104.8 kg)   SpO2 93%   BMI 34.10 kg/m²   General appearance: Sedated ET tube  HEENT: Head: Normal, normocephalic, atraumatic. Neck: supple, symmetrical, trachea midline  Lungs: diminished breath sounds bilaterally  Heart: S1, S2 normal  Abdomen: abnormal findings:  soft NT  Extremities: edema trace  Neurologic: Mental status: alertness: seDated      CBC:   Recent Labs     11/18/21  0600 11/19/21 0600 11/20/21  0510   WBC 9.2 6.9 8.8   HGB 10.0* 9.6* 9.4*   * 137* 160     BMP:    Recent Labs     11/18/21 0600 11/19/21  0600 11/20/21  0510    145 147*   K 3.5 4.0 3.6    108 107   CO2 30 27 32   BUN 41* 46* 49*   CREATININE 0.8* 0.8* 0.9   GLUCOSE 106* 171* 141*     Hepatic:   Recent Labs     11/18/21  0600 11/19/21  0600 11/20/21  0510   AST 33 18 15   ALT 27 22 19   BILITOT 1.2* 0.5 0.5   ALKPHOS 82 79 67     Troponin: No results for input(s): TROPONINI in the last 72 hours. BNP: No results for input(s): BNP in the last 72 hours. Lipids: No results for input(s): CHOL, HDL in the last 72 hours.     Invalid input(s): LDLCALCU  ABGs:   Lab Results   Component Value Date    PO2ART 73 11/20/2021    WON9EWN 54.0 11/20/2021     INR: No results for input(s): INR monitor mental status wean vent as able  #4 add free water  #5 maintain as above setting now off Covid restrictions  Follow monitor    Electronically signed by Kiana Quinn MD on 11/20/2021 at 10:53 AM

## 2021-11-20 NOTE — PROGRESS NOTES
Pt tolerated vent changes from FiO2 80 to 75, and AC rate change from 28 to 20, satting consistently in low 90s. Reduced sedation slightly after sedation vacation this am per Dr. Scarlett Corral, due to signs of pain in pt (increased HR, increased BP, increased grimacing). 45mL total urine output noted this afternoon since 0600 and BP was hypotensive over a period of 3-4 hours (MAPs 58-61, BP 90s-100s/40s), spoke to Dr. Sariah Akers who ordered a lasix injection, albumin, and dopamine. Lasix successful, urine output totaled 1250mL by end of shift. Dopamine is successfully keeping MAPs around 68-72. SpO2 currently sitting at 89-90. Pt is now opening eyes and grimacing during mouth care, but no other response at this time.

## 2021-11-20 NOTE — PROGRESS NOTES
Hospitalist Progress Note      PCP: Arjun Naidu MD    Date of Admission: 10/26/2021    LOS: 24    Subjective:   Overnight Events:   Uneventful overnight  Remains on the vent with PEEP of 14 and high FiO2  No clinical progress          Active Hospital Problems    Diagnosis Date Noted    Acute respiratory failure with hypoxia ([address] [J96.01] 11/16/2021    Cardiac arrest with ventricular fibrillation ([facility] [address] [I46.9, I49.01] 11/16/2021    Septic shock ([address] [A41.9, R65.21] 11/16/2021    Hematuria [R31.9] 11/16/2021    Pneumonia due to COVID-19 virus [U07.1, J12.82] 10/27/2021       Case Summary:   57-year-old gentleman with history of hyperlipidemia, hypertension admitted with COVID-19 pneumonia and acute respiratory failure with hypoxia complicated by V. fib cardiac arrest, hematuria due to inflated Gómez balloon and penile urethra Gómez catheter was deflated and removed and urology consulted. He received and completed baricitinib and steroids for Covid management      Principal Problem:    Pneumonia due to COVID-19 virus and Acute respiratory failure with hypoxia: Poor progress and remains on high FiO2 with tenuous respiratory status. Completed baricitinib  -Continue steroids and empiric antibiotics  Pulmonology following with recommendations and vent management  -Would encourage weaning down the PEEP with target sats greater than 90%    Active Problems:    Cardiac arrest with ventricular fibrillation [facility]): Cardiology was consulted for nonsustained wide-complex tachycardia in the setting of hypoxia likely VT. Patient has had episodes of bradycardia with PVCs. Cardiology was reconsulted.   Electrolytes replenished.  -Continues on as needed beta-blockers  -Monitor renal function and electrolytes      Tube feeds: Continue Reglan and tube feeds and hold as appropriate for residuals    Resolved Problems:    Septic shock    Hematuria        Medications:  Reviewed  Infusion Medications    DOPamine Stopped (11/19/21 2322)    vasopressin (Septic Shock) infusion Stopped (11/17/21 1110)    midazolam 5 mg/hr (11/20/21 0842)    fentaNYL 175 mcg/hr (11/20/21 0842)    sodium chloride       Scheduled Medications    albumin human  25 g IntraVENous Once    furosemide  60 mg IntraVENous Once    metoprolol  5 mg IntraVENous Once    methylPREDNISolone  70 mg IntraVENous Daily    immune globulin  40 g IntraVENous Daily    ceftazidime-acibactam (AVYCAZ) infusion  2.5 g IntraVENous Q8H    minocycline  100 mg Oral BID    lidocaine   Topical Once    ipratropium  4 puff Inhalation Q4H WA    tamsulosin  0.4 mg Oral Daily    metoclopramide  10 mg IntraVENous Q8H    pantoprazole  40 mg IntraVENous Daily    atorvastatin  10 mg Oral Daily    sodium chloride flush  5-40 mL IntraVENous 2 times per day    enoxaparin  30 mg SubCUTAneous BID     PRN Meds: magnesium sulfate, sodium chloride flush, ALPRAZolam, sodium chloride, sodium chloride flush, ondansetron **OR** ondansetron, polyethylene glycol, acetaminophen **OR** acetaminophen      DVT Prophylaxis: Subcut enoxaparin  Diet: Diet NPO  ADULT TUBE FEEDING; Nasogastric; Peptide Based; Continuous; 10; Yes; 25; Q 8 hours; 70; 30; Q 6 hours  Code Status: Full Code    Dispo: intubated and critical in ICU    Pt has a high probability of imminent or life-threatening deterioration requiring close monitoring, and highly complex decision-making and/or interventions of high intensity to assess, manipulate, and support his critical organ systems to prevent a likely inevitable decline which could occur if left untreated.   31   minutes of critical care time spent.      ____________________________________________________________________________    Physical Exam:  /67   Pulse 90   Temp 98.5 °F (36.9 °C) (Rectal)   Resp 20   Ht 5' 9.02\" (1.753 m)   Wt 231 lb 0.7 oz (104.8 kg)   SpO2 93%   BMI 34.10 kg/m²   General appearance: No apparent distress, appears stated age and cooperative. Intubated and sedated  HEENT: Normocephalic, atraumatic, MMM, No sclera icterus/conjuctival palor  Neck: Supple, no thyromegally. No jugular venous distention. Respiratory:  Normal respiratory effort. Clear to auscultation, no Rales/Wheezes/Rhonchi. Cardiovascular: S1/S2 without murmurs, rubs or gallops. RRR  Abdomen: Soft, non-tender, non-distended, bowel sounds present. Musculoskeletal: No clubbing, cyanosis or edema bilaterally. Skin: Skin color, texture, turgor normal.  No rashes or lesions. Neurologic: intubated and sedated      Intake/Output Summary (Last 24 hours) at 11/20/2021 1125  Last data filed at 11/20/2021 0847  Gross per 24 hour   Intake 2753.23 ml   Output 2945 ml   Net -191.77 ml       Labs:   Recent Labs     11/18/21  0600 11/19/21  0600 11/20/21  0510   WBC 9.2 6.9 8.8   HGB 10.0* 9.6* 9.4*   HCT 31.1* 29.5* 29.3*   * 137* 160      Recent Labs     11/18/21  0600 11/19/21  0600 11/20/21  0510    145 147*   K 3.5 4.0 3.6    108 107   CO2 30 27 32   BUN 41* 46* 49*   CREATININE 0.8* 0.8* 0.9   CALCIUM 8.8 8.9 9.0   PHOS 2.8 3.0 3.1   AST 33 18 15   ALT 27 22 19   BILITOT 1.2* 0.5 0.5   ALKPHOS 82 79 67     No results for input(s): CKTOTAL, TROPONINI in the last 72 hours. Urinalysis:  No results found for: Gary Lasso, BACTERIA, RBCUA, BLOODU, [address]    Radiology:  XR CHEST PORTABLE   Final Result   Stable chest.         XR CHEST PORTABLE   Final Result   Again identified are diffuse multifocal infiltrates throughout the lungs   bilaterally with a similar appearance. XR CHEST PORTABLE   Final Result   1. Stable lines, tubes, and support devices. 2. Unchanged patchy ground-glass opacities with small effusions.          XR ABDOMEN (2 VIEWS)   Final Result   No evidence for bowel obstruction      Enteric catheter coiled in fundus of stomach         XR CHEST PORTABLE   Final Result   Redemonstration of diffuse bilateral interstitial and airspace opacities,   which may reflect multifocal pneumonia or pulmonary edema, appearing grossly   similar to the prior study. Supporting devices as above, appearing grossly similar to the prior study. XR CHEST PORTABLE   Final Result   Interval worsening. Severe diffuse infiltrates and/or congestion identified   in the lungs. XR CHEST PORTABLE   Final Result   Multifocal opacities are redemonstrated. The apparent worsening of the right   lung may be due to decreased inflation. XR CHEST PORTABLE   Final Result   Stable chest x-ray with multifocal opacities. Lines and tubes are acceptable. XR CHEST PORTABLE   Final Result   Similar appearance of extensive bilateral airspace opacities,      Endotracheal tube in appropriate position in the midthoracic trachea. Remaining tubes and lines are also unchanged. XR CHEST PORTABLE   Final Result   No change in life support. Moderate to severe bilateral airspace disease, similar to the prior exam.         XR ABDOMEN FOR NG/OG/NE TUBE PLACEMENT   Final Result   Nasogastric tube tip is in the stomach         XR CHEST PORTABLE   Final Result   Endotracheal tube with the tip at T4. New right internal jugular central venous line with the tip in the superior   vena cava. No pneumothorax. Bilateral pulmonary opacities are unchanged. CT PELVIS WO CONTRAST Additional Contrast? None   Final Result   1. A Gómez catheter has been pulled back with the balloon portion inflated in   the penile urethra. Repositioning is recommended. The catheter is not   within the bladder. XR CHEST PORTABLE   Final Result   1. Bilateral lung infiltrates, compatible with pneumonia. 2. Trace right apical pneumothorax. 3. Pneumomediastinum. 4. No left pneumothorax. XR ABDOMEN (KUB) (SINGLE AP VIEW)   Final Result   1. No evidence of bowel obstruction. 2. Suspected bilateral nephrolithiasis.          XR CHEST PORTABLE   Final Result   1. Diffuse bilateral airspace opacities without significant change. 2. Trace biapical pneumothoraces pneumomediastinum and pneumopericardium with   subcutaneous emphysematous changes involving the base the neck. No   significant change. XR CHEST PORTABLE   Final Result   1. Extensive bilateral airspace opacities without significant change. 2. Pneumomediastinum, pneumopericardium, and trace biapical pneumothoraces   without significant change. XR CHEST PORTABLE   Final Result   Patchy airspace opacities bilaterally, may be related to pulmonary edema   versus pneumonia. Stable pneumomediastinum      No definite pneumothorax. XR CHEST PORTABLE   Final Result   New pneumomediastinum, subcutaneous emphysema and trace right apical   pneumothorax. XR CHEST PORTABLE   Final Result   1. Stable diffuse airspace opacities. XR CHEST PORTABLE   Final Result   Findings most consistent with severe bilateral COVID pneumonia. This appears   progressed since the recent prior CT exam.         CTA PULMONARY W CONTRAST   Final Result   1. No pulmonary embolus. 2. Pulmonary opacities compatible with viral pneumonia. 3. Reactive mediastinal and hilar lymphadenopathy. Maico Coyle MD      Please excuse brevity and/or typos. This report was transcribed using voice recognition software. Every effort was made to ensure accuracy, however, inadvertent computerized transcription errors may be present.

## 2021-11-20 NOTE — PROGRESS NOTES
Comprehensive Nutrition Assessment    Type and Reason for Visit:  Reassess    Nutrition Recommendations/Plan:   · Advance EN of peptide based to 70 mL/hr    Nutrition Assessment:  Pt now has EN at 10 mL/hr and is tolerating the EN. Will order to advance to goal    Malnutrition Assessment:  Malnutrition Status:  Severe malnutrition    Context:  Acute Illness     Findings of the 6 clinical characteristics of malnutrition:  Energy Intake:  7 - 50% or less of estimated energy requirements for 5 or more days  Weight Loss:  7 - Greater than 2% over 1 week     Body Fat Loss:  Unable to assess     Muscle Mass Loss:  Unable to assess    Fluid Accumulation:  No significant fluid accumulation Generalized   Strength:  Not Performed    Estimated Daily Nutrient Needs:  Energy (kcal):  2100; Weight Used for Energy Requirements:  Current     Protein (g):  146+ (2+ g/kg IBW);  Weight Used for Protein Requirements:  Ideal        Fluid (ml/day):  1600; Method Used for Fluid Requirements:  1 ml/kcal      Wounds:  None       Current Nutrition Therapies:    Diet NPO  ADULT TUBE FEEDING; Nasogastric; Peptide Based; Continuous; 10; No; 30; Q 6 hours    Anthropometric Measures:  · Height: 5' 9.02\" (175.3 cm)  · Current Body Weight: 231 lb (104.8 kg)   · Admission Body Weight: 242 lb (109.8 kg)    · Usual Body Weight: 254 lb (115.2 kg)     · Ideal Body Weight: 160 lbs; % Ideal Body Weight 153.4 %   · BMI: 34.1   · BMI Categories: Obese Class 2 (BMI 35.0 -39.9)       Nutrition Diagnosis:   · Inadequate oral intake related to acute injury/trauma as evidenced by NPO or clear liquid status due to medical condition      Nutrition Interventions:   Food and/or Nutrient Delivery:  Modify Tube Feeding  Nutrition Education/Counseling:  No recommendation at this time   Coordination of Nutrition Care:  Continue to monitor while inpatient    Goals:  Pt will meet greater than 75% of estimated nutrient needs via EN       Nutrition Monitoring and Evaluation:   Behavioral-Environmental Outcomes:  None Identified   Food/Nutrient Intake Outcomes:  Enteral Nutrition Intake/Tolerance  Physical Signs/Symptoms Outcomes:  Biochemical Data, GI Status, Hemodynamic Status, Fluid Status or Edema, Weight, Skin     Discharge Planning:     Too soon to determine     Electronically signed by Robert Melendez RD, LD on 93/47/34 at 8:02 PM EST    Contact: [phone]

## 2021-11-20 NOTE — PROGRESS NOTES
pulmonary      SUBJECTIVE:intubated on vent     OBJECTIVE    VITALS:  BP (!) 142/72   Pulse 86   Temp 98.9 °F (37.2 °C) (Rectal)   Resp 21   Ht 5' 9.02\" (1.753 m)   Wt 231 lb 0.7 oz (104.8 kg)   SpO2 90%   BMI 34.10 kg/m²   HEAD AND FACE EXAM:  No throat injection, no active exudate,no thrush  NECK EXAM;No JVD, no masses, symmetrical  CHEST EXAM; Expansion equal and symmetrical, no masses  LUNG EXAM; Good breath sounds bilaterally. There are expiratory wheezes both lungs, there are crackles at both lung bases  CARDIOVASCULAR EXAM: Positive S1 and S2, no S3 or S4, no clicks ,no murmurs  RIGHT AND LEFT LOWER EXTRIMITY EXAM: No edema, no swelling, no inflamation            LABS   Lab Results   Component Value Date    WBC 8.8 11/20/2021    HGB 9.4 (L) 11/20/2021    HCT 29.3 (L) 11/20/2021    MCV 97.0 11/20/2021     11/20/2021     Lab Results   Component Value Date    CREATININE 0.9 11/20/2021    BUN 49 (H) 11/20/2021     (H) 11/20/2021    K 3.6 11/20/2021     11/20/2021    CO2 32 11/20/2021     Lab Results   Component Value Date    INR 1.05 10/28/2021    PROTIME 13.5 10/28/2021          Lab Results   Component Value Date    PHOS 3.1 11/20/2021    PHOS 3.0 11/19/2021    PHOS 2.8 11/18/2021        Recent Labs     11/19/21  0600 11/20/21  0600   PH 7.45 7.45   PO2ART 82 73*   CSJ0OQH 47.0* 54.0*   O2SAT 95.0* 93.8*         Wt Readings from Last 3 Encounters:   11/18/21 231 lb 0.7 oz (104.8 kg)   12/15/15 256 lb (116.1 kg)               ASSESMENT  Ac resp faqilure  covid pneumonia        PLAN  1. cpm  2. Cont vent swupport  3. abg is acceptable  4.  No cxr done today    11/20/2021  Anahy Fuchs MD, M.D.

## 2021-11-20 NOTE — PROGRESS NOTES
11/20/21 0917   Vent Information   $Ventilation $Subsequent Day   Vent Type 840   Vent Mode AC/PC   Vt Ordered 500 mL   Pressure Ordered 35   Rate Set 20 bmp   Peak Flow 50 L/min   FiO2  75 %   SpO2 90 %   SpO2/FiO2 ratio 120   Sensitivity 3   PEEP/CPAP 12   Humidification Source HME   Vent Patient Data   Peak Inspiratory Pressure 48 cmH2O   Mean Airway Pressure 21 cmH20   Rate Measured 20 br/min   Vt Exhaled 540 mL   Minute Volume 11.3 Liters   I:E Ratio 1:2.4   Plateau Pressure 28 WXL02   Static Compliance 44 mL/cmH2O   Total PEEP 12 cmH20   Auto PEEP 0.1 cmH20   Cough/Sputum   Sputum How Obtained Endotracheal; Suctioned   $Obtained Sample $Induced Sputum   Cough Productive   Frequency Infrequent   Sputum Amount Small   Sputum Color Creamy; Yellow   Tenacity Thick   Spontaneous Breathing Trial (SBT) RT Doc   Pulse 93   Breath Sounds   Right Upper Lobe Diminished   Right Middle Lobe Diminished   Right Lower Lobe Diminished   Left Upper Lobe Diminished   Left Lower Lobe Diminished   Additional Respiratory  Assessments   Resp 22   Position Semi-Conteh's   Oral Care Mouth suctioned   Alarm Settings   High Pressure Alarm 50 cmH2O   Delay Alarm 20 sec(s)   Low Minute Volume Alarm 2.5 L/min   Apnea (secs) 20 secs   High Respiratory Rate 45 br/min   Low Exhaled Vt  250 mL   ETT (adult)   Placement Date/Time: 11/08/21 1122   Preoxygenation: Yes  Mask Ventilation: Ventilated by mask (1)  Technique: Direct laryngoscopy  Type: Cuffed  Tube Size: 8 mm  Laryngoscope: Mac  Blade Size: 3  Location: Oral  Insertion attempts: 1  Placement Verif. ..    Secured at 24 cm   Measured From Lips   ET Placement Right   Secured By Commercial tube culver   Site Condition Dry

## 2021-11-20 NOTE — PROGRESS NOTES
Family no longer at bedside when dr came to room. Per dr paperwork will need looked at by case management.

## 2021-11-21 NOTE — PROGRESS NOTES
Nephrology Progress Note  11/21/2021 9:07 AM  Subjective: Interval History: Alonzo Roberts is a 78 y.o. male with on ventilator currently 80% FiO2 positive urine output        Data:   Scheduled Meds:   metoprolol  5 mg IntraVENous Once    methylPREDNISolone  70 mg IntraVENous Daily    ceftazidime-acibactam (AVYCAZ) infusion  2.5 g IntraVENous Q8H    minocycline  100 mg Oral BID    lidocaine   Topical Once    ipratropium  4 puff Inhalation Q4H WA    tamsulosin  0.4 mg Oral Daily    metoclopramide  10 mg IntraVENous Q8H    pantoprazole  40 mg IntraVENous Daily    atorvastatin  10 mg Oral Daily    sodium chloride flush  5-40 mL IntraVENous 2 times per day    enoxaparin  30 mg SubCUTAneous BID     Continuous Infusions:   DOPamine Stopped (11/19/21 2322)    vasopressin (Septic Shock) infusion Stopped (11/17/21 1110)    midazolam 5 mg/hr (11/21/21 0721)    fentaNYL 200 mcg/hr (11/21/21 0721)    sodium chloride           CBC   Recent Labs     11/19/21  0600 11/20/21  0510 11/21/21  0510   WBC 6.9 8.8 7.7   HGB 9.6* 9.4* 9.8*   HCT 29.5* 29.3* 30.5*   * 160 157      BMP   Recent Labs     11/19/21  0600 11/20/21  0510 11/21/21  0510    147* 152*   K 4.0 3.6 3.7    107 109   CO2 27 32 34*   PHOS 3.0 3.1 3.1   BUN 46* 49* 51*   CREATININE 0.8* 0.9 0.9     Hepatic:   Recent Labs     11/19/21  0600 11/20/21  0510   AST 18 15   ALT 22 19   BILITOT 0.5 0.5   ALKPHOS 79 67     Troponin: No results for input(s): TROPONINI in the last 72 hours. BNP: No results for input(s): BNP in the last 72 hours. Lipids: No results for input(s): CHOL, HDL in the last 72 hours. Invalid input(s): LDLCALCU  ABGs:   Lab Results   Component Value Date    PO2ART 136 11/21/2021    RYU0HXP 60.0 11/21/2021     INR: No results for input(s): INR in the last 72 hours.   Renal Labs  Albumin:    Lab Results   Component Value Date    LABALBU 3.2 11/21/2021     Calcium:    Lab Results   Component Value Date CALCIUM 8.9 11/21/2021     Phosphorus:    Lab Results   Component Value Date    PHOS 3.1 11/21/2021     U/A:  No results found for: Mariam Jc, PHUR, LABCAST, WBCUA, RBCUA, MUCUS, TRICHOMONAS, YEAST, BACTERIA, CLARITYU, SPECGRAV, UROBILINOGEN, BILIRUBINUR, BLOODU, GLUCOSEU, KETUA, AMORPHOUS  ABG:    Lab Results   Component Value Date    XKG0AWO 60.0 11/21/2021    PO2ART 136 11/21/2021    EOG7RTK 43.7 11/21/2021     HgBA1c:  No results found for: LABA1C  Microalbumen/Creatinine ratio:  No components found for: RUCREAT  TSH:  No results found for: TSH  IRON:    Lab Results   Component Value Date    IRON 68 11/15/2021     Iron Saturation:  No components found for: PERCENTFE  TIBC:    Lab Results   Component Value Date    TIBC 144 11/15/2021     FERRITIN:    Lab Results   Component Value Date    FERRITIN 2,544 11/16/2021     RPR:  No results found for: RPR  LINDA:  No results found for: ANATITER, LINDA  24 Hour Urine for Creatinine Clearance:  No components found for: CREAT4, UHRS10, UTV10      Objective:   I/O: 11/20 0701 - 11/21 0700  In: 2465.9 [I.V.:578.8]  Out: 3635 [Urine:3775]  I/O last 3 completed shifts: In: 2465.9 [I.V.:578.8; NG/GT:1520; IV Piggyback:367.1]  Out: 4292 [VTGSM:8630]  I/O this shift: In: 84.2 [I.V.:29.5; IV Piggyback:54.7]  Out: -   Vitals: BP (!) 142/58   Pulse 110   Temp 100.4 °F (38 °C)   Resp 20   Ht 5' 9.02\" (1.753 m)   Wt 224 lb 10.4 oz (101.9 kg)   SpO2 92%   BMI 33.16 kg/m²  {  General appearance: Sedated ET tube  HEENT: Head: Normal, normocephalic, atraumatic.   Neck: supple, symmetrical, trachea midline  Lungs: diminished breath sounds bilaterally  Heart: S1, S2 normal  Abdomen: abnormal findings:  soft nt  Extremities: edema trace  Neurologic: Mental status: alertness: Sedated        Assessment and Plan:      IMP:  1.  Low urine output risk of acute renal failure/hematuria  #2 moderate protein malnutrition  #3 v. fib cardiac arrest  #4 hypernatremia  #5 respiratory failure  #6 COVID-19 positive    Plan     #1 improve urine output with diuresis maintain on IV Lasix with albumin  #2 maintain on tube feeds as able  3 currently on the vent wean as able portability at this time still full code, may need to start considering trach and PEG and long-term care  #4 in the setting of diuresis sodium increased will increase free water  #5 treated for Covid off isolation  We will follow monitor closely           Elenita Jacob MD, MD

## 2021-11-21 NOTE — PROGRESS NOTES
11/21/21 0921   Vent Information   $Ventilation $Subsequent Day   Vent Type 840   Vent Mode AC/PC   Pressure Ordered 35   Rate Set 20 bmp   Peak Flow 50 L/min   FiO2  80 %   SpO2 93 %   SpO2/FiO2 ratio 116.25   Sensitivity 3   PEEP/CPAP 12   Humidification Source HME   Vent Patient Data   Peak Inspiratory Pressure 48 cmH2O   Mean Airway Pressure 21 cmH20   Rate Measured 20 br/min   Vt Exhaled 451 mL   Minute Volume 9.32 Liters   I:E Ratio 1:2.8   Cough/Sputum   Sputum How Obtained Endotracheal; Suctioned   $Obtained Sample $Induced Sputum   Cough Productive   Frequency Infrequent   Sputum Amount Small   Sputum Color Creamy; Yellow   Tenacity Thick   Breath Sounds   Right Upper Lobe Diminished   Right Middle Lobe Diminished   Right Lower Lobe Diminished   Left Upper Lobe Diminished   Left Lower Lobe Diminished   Alarm Settings   High Pressure Alarm 50 cmH2O   Delay Alarm 20 sec(s)   Low Minute Volume Alarm 2.5 L/min   Apnea (secs) 20 secs   High Respiratory Rate 45 br/min   Low Exhaled Vt  250 mL   ETT (adult)   Placement Date/Time: 11/08/21 1122   Preoxygenation: Yes  Mask Ventilation: Ventilated by mask (1)  Technique: Direct laryngoscopy  Type: Cuffed  Tube Size: 8 mm  Laryngoscope: Mac  Blade Size: 3  Location: Oral  Insertion attempts: 1  Placement Verif. ..    Secured at 24 cm   Measured From [address] [place] [address],[address] By Commercial tube culver   Site Condition Dry

## 2021-11-21 NOTE — PROGRESS NOTES
Hospitalist Progress Note      Name:  Mickey Bonds /Age/Sex: 1955  (21 y.o. male)   MRN & CSN:  [id number] & [id number] Admission Date/Time: 10/26/2021 11:34 PM   Location:  -A PCP: Michelle Carter MD       Hospital Day:       Assessment and Plan:     14-year-old gentleman with history of hyperlipidemia, hypertension admitted with COVID-19 pneumonia and acute respiratory failure with hypoxia complicated by V. fib cardiac arrest, hematuria due to inflated Gómez balloon and penile urethra Gómez catheter was deflated and removed and urology consulted. He received and completed baricitinib and steroids for Covid management    Acute respiratory failure with hypoxia  -Continue mechanical ventilation    Pneumonia due to COVID-19 virus  -Remains on high FiO2 and PEEP  -Completed baricitinib  -Continue steroids  -Continue empiric antibiotics  -Pulmonary following    Cardiac arrest with ventricular fibrillation  -Per cardiology he had nonsustained wide-complex tachycardia in the setting of hypoxia    Tube feedings: Ordered    He appears to have had septic shock    Hematuria-send urine culture      Subjective:     Spoke to his nurse Miranda Garrett  -there were no major new issues today other than fever  -Dr. Cornelia Walton is pulling more fluid off of him    Family contact: On  I called and updated his daughter Adrien Figueredo. 21 I     Objective:        Intake/Output Summary (Last 24 hours) at 2021 2322  Last data filed at 2021 1733  Gross per 24 hour   Intake 2964.98 ml   Output 3700 ml   Net -735.02 ml      Vitals:   Vitals:    21 2100   BP:    Pulse: 89   Resp: 20   Temp:    SpO2: 94%         Labs:   CBC:   Lab Results   Component Value Date    WBC 7.7 2021    HGB 9.8 2021    HCT 30.5 2021    MCV 98.4 2021     2021     BMP:   Lab Results   Component Value Date     2021    K 3.7 2021     2021    CO2 34 2021    PHOS 3.1 11/21/2021    BUN 51 11/21/2021    CREATININE 0.9 11/21/2021    CALCIUM 8.9 11/21/2021       Physical Exam:      ETT corner or right mouth noted placed 13 days ago on 11/8 per LDA section of chart    On Vent high settings    AC 20   FiO2 90%  PEEP 12    OG right corner of mouth noted, placed 11/8    Vital AF 1.2 TF running at 70 mL per hour  Body mass index is 33.16 kg/m². R IJ TLC noted, placed 11/8    Gómez noted, urinometer with about 150 mL urine    Fentanyl running 200 mcg    Versed running at 5 mg    Head leaning toward left    Left radial A-line noted, placed 11/13        Physical Exam  Constitutional:       Appearance: He is ill-appearing. HENT:      Nose: No rhinorrhea. Eyes:      General:         Right eye: No discharge. Left eye: No discharge. Pulmonary:      Effort: Pulmonary effort is normal.      Comments: He is intubated and sedated  Skin:     Coloration: Skin is not jaundiced. Neurological:      Cranial Nerves: No cranial nerve deficit.                      Medications:   Medications:    metoprolol  5 mg IntraVENous Once    methylPREDNISolone  70 mg IntraVENous Daily    ceftazidime-acibactam (AVYCAZ) infusion  2.5 g IntraVENous Q8H    minocycline  100 mg Oral BID    lidocaine   Topical Once    ipratropium  4 puff Inhalation Q4H WA    tamsulosin  0.4 mg Oral Daily    metoclopramide  10 mg IntraVENous Q8H    pantoprazole  40 mg IntraVENous Daily    atorvastatin  10 mg Oral Daily    sodium chloride flush  5-40 mL IntraVENous 2 times per day    enoxaparin  30 mg SubCUTAneous BID      Infusions:    DOPamine Stopped (11/19/21 2322)    vasopressin (Septic Shock) infusion Stopped (11/17/21 1110)    midazolam 5 mg/hr (11/21/21 1732)    fentaNYL 200 mcg/hr (11/21/21 2021)    sodium chloride Stopped (11/21/21 1729)     PRN Meds: magnesium sulfate, 1,000 mg, PRN  sodium chloride flush, 5-40 mL, PRN  ALPRAZolam, 0.25 mg, Q8H PRN  sodium chloride, 25 mL, PRN  sodium chloride flush, 5-40 mL, PRN  ondansetron, 4 mg, Q8H PRN   Or  ondansetron, 4 mg, Q6H PRN  polyethylene glycol, 17 g, Daily PRN  acetaminophen, 650 mg, Q6H PRN   Or  acetaminophen, 650 mg, Q6H PRN          Electronically signed by Chun Kitchen MD on 11/21/2021 at 11:22 PM

## 2021-11-21 NOTE — PROGRESS NOTES
pulmonary      SUBJECTIVE:  Remains on vent support     OBJECTIVE    VITALS:  BP (!) 142/58   Pulse 90   Temp 100.4 °F (38 °C) (Rectal)   Resp 20   Ht 5' 9.02\" (1.753 m)   Wt 224 lb 10.4 oz (101.9 kg)   SpO2 91%   BMI 33.16 kg/m²   HEAD AND FACE EXAM:  No throat injection, no active exudate,no thrush  NECK EXAM;No JVD, no masses, symmetrical  CHEST EXAM; Expansion equal and symmetrical, no masses  LUNG EXAM; Good breath sounds bilaterally.  There are expiratory wheezes both lungs, there are crackles at both lung bases  CARDIOVASCULAR EXAM: Positive S1 and S2, no S3 or S4, no clicks ,no murmurs  RIGHT AND LEFT LOWER EXTRIMITY EXAM: No edema, no swelling, no inflamation            LABS   Lab Results   Component Value Date    WBC 7.7 11/21/2021    HGB 9.8 (L) 11/21/2021    HCT 30.5 (L) 11/21/2021    MCV 98.4 11/21/2021     11/21/2021     Lab Results   Component Value Date    CREATININE 0.9 11/21/2021    BUN 51 (H) 11/21/2021     (H) 11/21/2021    K 3.7 11/21/2021     11/21/2021    CO2 34 (H) 11/21/2021     Lab Results   Component Value Date    INR 1.05 10/28/2021    PROTIME 13.5 10/28/2021          Lab Results   Component Value Date    PHOS 3.1 11/21/2021    PHOS 3.1 11/20/2021    PHOS 3.0 11/19/2021        Recent Labs     11/19/21  0600 11/20/21  0600 11/21/21  0600   PH 7.45 7.45 7.47*   PO2ART 82 73* 136*   PLE8TRB 47.0* 54.0* 60.0*   O2SAT 95.0* 93.8* 95.1*         Wt Readings from Last 3 Encounters:   11/21/21 224 lb 10.4 oz (101.9 kg)   12/15/15 256 lb (116.1 kg)         Narrative   EXAMINATION:   ONE XRAY VIEW OF THE CHEST       11/19/2021 5:19 am       COMPARISON:   11/17/2021       HISTORY:   ORDERING SYSTEM PROVIDED HISTORY: Hypoxia   TECHNOLOGIST PROVIDED HISTORY:   Reason for exam:->Hypoxia   Reason for Exam: Hypoxia   Acuity: Unknown   Type of Exam: Subsequent/Follow-up   Additional signs and symptoms: Hypoxia   Relevant Medical/Surgical History: Hypoxia       FINDINGS:   The right

## 2021-11-21 NOTE — PROGRESS NOTES
11/21/21 1637   Vent Information   Vent Type 840   Vent Mode AC/PC   Pressure Ordered 35   Rate Set 20 bmp   Peak Flow 50 L/min   FiO2  90 %   SpO2 91 %   SpO2/FiO2 ratio 101.11   Sensitivity 3   PEEP/CPAP 12   Humidification Source HME   Vent Patient Data   Peak Inspiratory Pressure 48 cmH2O   Mean Airway Pressure 21 cmH20   Rate Measured 20 br/min   Vt Exhaled 546 mL   Minute Volume 10.8 Liters   I:E Ratio 1:2.8   Plateau Pressure 20 MYD71   Static Compliance 39 mL/cmH2O   Total PEEP 13 cmH20   Auto PEEP 0 cmH20   Cough/Sputum   Sputum How Obtained Endotracheal; Suctioned   $Obtained Sample $Induced Sputum   Cough Productive   Frequency Infrequent   Sputum Amount Small   Sputum Color Creamy; Yellow   Tenacity Thick   Breath Sounds   Right Upper Lobe Diminished   Right Middle Lobe Diminished   Right Lower Lobe Diminished   Left Upper Lobe Diminished   Left Lower Lobe Diminished   Additional Respiratory  Assessments   Position Semi-Conteh's   Oral Care Mouth suctioned   Alarm Settings   High Pressure Alarm 50 cmH2O   Delay Alarm 20 sec(s)   Low Minute Volume Alarm 2.5 L/min   Apnea (secs) 20 secs   High Respiratory Rate 45 br/min   Low Exhaled Vt  250 mL   ETT (adult)   Placement Date/Time: 11/08/21 1122   Preoxygenation: Yes  Mask Ventilation: Ventilated by mask (1)  Technique: Direct laryngoscopy  Type: Cuffed  Tube Size: 8 mm  Laryngoscope: Mac  Blade Size: 3  Location: Oral  Insertion attempts: 1  Placement Verif. ..    Secured at 24 cm   Measured From [address] [place] [address],[address] By [employer] tube culver   Site Condition Dry

## 2021-11-21 NOTE — PROGRESS NOTES
11/21/21 1220   Vent Information   Equipment Changed HME   Vent Type 840   Vent Mode AC/PC   Pressure Ordered 35   Rate Set 20 bmp   Peak Flow 50 L/min   FiO2  80 %   SpO2 92 %   SpO2/FiO2 ratio 115   Sensitivity 3   PEEP/CPAP 12   Humidification Source HME   Vent Patient Data   Peak Inspiratory Pressure 48 cmH2O   Mean Airway Pressure 21 cmH20   Rate Measured 20 br/min   Vt Exhaled 583 mL   Minute Volume 10 Liters   I:E Ratio 1:2.8   Plateau Pressure 17 FDI19   Static Compliance 43 mL/cmH2O   Total PEEP 12 cmH20   Auto PEEP 0 cmH20   Breath Sounds   Right Upper Lobe Diminished   Right Middle Lobe Diminished   Right Lower Lobe Diminished   Left Upper Lobe Diminished   Left Lower Lobe Diminished   Additional Respiratory  Assessments   Position Semi-Conteh's   Alarm Settings   High Pressure Alarm 50 cmH2O   Delay Alarm 20 sec(s)   Low Minute Volume Alarm 2.5 L/min   Apnea (secs) 20 secs   High Respiratory Rate 45 br/min   Low Exhaled Vt  250 mL   ETT (adult)   Placement Date/Time: 11/08/21 1122   Preoxygenation: Yes  Mask Ventilation: Ventilated by mask (1)  Technique: Direct laryngoscopy  Type: Cuffed  Tube Size: 8 mm  Laryngoscope: Mac  Blade Size: 3  Location: Oral  Insertion attempts: 1  Placement Verif. ..    Secured at 24 cm   Measured From [address] [place] [address],[address] By [employer] tube culver   Site Condition Dry

## 2021-11-22 NOTE — PROGRESS NOTES
11/22/21 0247   Vent Information   Vent Type 840   Vent Mode AC/PC   Rate Set 20 bmp   FiO2  90 %   SpO2 93 %   SpO2/FiO2 ratio 103.33   Sensitivity 3   PEEP/CPAP 12   I Time/ I Time % 0.8 s   Humidification Source HME   Vent Patient Data   High Peep/I Pressure 35   Peak Inspiratory Pressure 48 cmH2O   Mean Airway Pressure 22 cmH20   Rate Measured 21 br/min   Vt Exhaled 437 mL   Minute Volume 8.98 Liters   I:E Ratio 1:2.8   Total PEEP 12 cmH20   Spontaneous Breathing Trial (SBT) RT Doc   Pulse 98   Additional Respiratory  Assessments   Resp 20   ETT (adult)   Placement Date/Time: 11/08/21 1122   Preoxygenation: Yes  Mask Ventilation: Ventilated by mask (1)  Technique: Direct laryngoscopy  Type: Cuffed  Tube Size: 8 mm  Laryngoscope: Mac  Blade Size: 3  Location: Oral  Insertion attempts: 1  Placement Verif. ..    Secured at 24 cm   Measured From [address] [place] [address],[address] By [employer] tube cluver   Site Condition Dry

## 2021-11-22 NOTE — PROGRESS NOTES
Pt HR 120s, O2 sat 89%, BP elevated, pt remains agitated. Dr. Shahrzad Rodriguez at bedside for rounds. Verbal orders for 1 mg Ativan IVP and 10 mg Cardizem IVP. Precedex gtt started. Pt rectal temp 100.8, rectal tylenol given.

## 2021-11-22 NOTE — PROGRESS NOTES
Hospitalist Progress Note      Name:  Brice Santiago /Age/Sex: 1955  (31 y.o. male)   MRN & CSN:  [id number] & [id number] Admission Date/Time: 10/26/2021 11:34 PM   Location:  -A PCP: Quincy Rios MD       Hospital Day: 28      Assessment and Plan:      update-discussed with Dr. Kofi Hill regarding tracheostomy. Vent settings are still quite high. He is on PEEP of 14 today.  -He has persistent fever. A-line which has been in place since about 1113 discontinued. Culture sent. Gómez catheter changed. Central line location to be changed, and central line to be cultured. Fever persists despite being on Avycaz. Ultrasound is ordered to rule out DVT. CT ordered, however, unable to be done due to being on high vent settings right now. 58-year-old gentleman with history of hyperlipidemia, hypertension admitted with COVID-19 pneumonia and acute respiratory failure with hypoxia complicated by V. fib cardiac arrest, hematuria due to inflated Gómez balloon and penile urethra Gómez catheter was deflated and removed and urology consulted.   He received and completed baricitinib and steroids for Covid management    Acute respiratory failure with hypoxia  -Continue mechanical ventilation    Pneumonia due to COVID-19 virus  -Remains on high FiO2 and PEEP  -Completed baricitinib  -Continue steroids  -Continue empiric antibiotics, follow-up cultures  -Pulmonary following    Cardiac arrest with ventricular fibrillation  -Per cardiology he had nonsustained wide-complex tachycardia in the setting of hypoxia    Tube feedings: Ordered, continue    He appears to have had septic shock    Hematuria-send urine culture      Subjective:     :    D/W CM - consulted LTAC for now    D/W charge nurse at 10:55 am -informed he is on a PEEP of 14.  -He was intubated  -sent message to pulmonary    He is sedated and intubated    :    Spoke to his nurse Taurus Sawyer  -there were no major new issues today other than fever  -Dr. Baby August is pulling more fluid off of him    Family contact: On November 1 I called and updated his daughter Price [place] I     Objective: Intake/Output Summary (Last 24 hours) at 11/22/2021 1051  Last data filed at 11/22/2021 0800  Gross per 24 hour   Intake 3295.41 ml   Output 3900 ml   Net -604.59 ml      Vitals:   Vitals:    11/22/21 1000   BP:    Pulse: 106   Resp: 19   Temp:    SpO2: 94%         Labs:   CBC:   Lab Results   Component Value Date    WBC 7.8 11/22/2021    HGB 10.0 11/22/2021    HCT 31.1 11/22/2021    MCV 99.4 11/22/2021     11/22/2021     BMP:   Lab Results   Component Value Date     11/22/2021    K 3.7 11/22/2021     11/22/2021    CO2 39 11/22/2021    PHOS 2.7 11/22/2021    BUN 50 11/22/2021    CREATININE 0.8 11/22/2021    CALCIUM 9.1 11/22/2021       Physical Exam:      ETT placed 11/8 per LDA section of chart    Sternal on Vent high settings    AC 20   FiO2 100%  PEEP 14    OG  placed 11/8    Vital AF 1.2 TF running at 70 mL per hour  Body mass index is 33.16 kg/m². R IJ TLC noted, placed 11/8    Gómez noted, urinometer with about 150 mL urine    Fentanyl running 200 mcg    Versed running at 5 mg    Left radial A-line noted, placed 11/13 -removed today        Physical Exam  Constitutional:       Appearance: He is ill-appearing. HENT:      Nose: No rhinorrhea. Eyes:      General:         Right eye: No discharge. Left eye: No discharge. Pulmonary:      Effort: Pulmonary effort is normal.      Comments: He is intubated and sedated  Skin:     Coloration: Skin is not jaundiced. Neurological:      Cranial Nerves: No cranial nerve deficit.                      Medications:   Medications:    metoprolol  5 mg IntraVENous Once    methylPREDNISolone  70 mg IntraVENous Daily    ceftazidime-acibactam (AVYCAZ) infusion  2.5 g IntraVENous Q8H    minocycline  100 mg Oral BID    lidocaine   Topical Once    ipratropium  4 puff Inhalation Q4H WA    tamsulosin  0.4 mg Oral Daily    metoclopramide  10 mg IntraVENous Q8H    pantoprazole  40 mg IntraVENous Daily    atorvastatin  10 mg Oral Daily    sodium chloride flush  5-40 mL IntraVENous 2 times per day    enoxaparin  30 mg SubCUTAneous BID      Infusions:    dexmedetomidine (PRECEDEX) IV infusion 0.2 mcg/kg/hr (11/22/21 0906)    midazolam 5 mg/hr (11/22/21 0736)    fentaNYL 200 mcg/hr (11/22/21 0733)    sodium chloride Stopped (11/21/21 6639)     PRN Meds: magnesium sulfate, 1,000 mg, PRN  sodium chloride flush, 5-40 mL, PRN  ALPRAZolam, 0.25 mg, Q8H PRN  sodium chloride, 25 mL, PRN  sodium chloride flush, 5-40 mL, PRN  ondansetron, 4 mg, Q8H PRN   Or  ondansetron, 4 mg, Q6H PRN  polyethylene glycol, 17 g, Daily PRN  acetaminophen, 650 mg, Q6H PRN   Or  acetaminophen, 650 mg, Q6H PRN          Electronically signed by Ana Maria Manjarrez MD on 11/22/2021 at 10:51 AM

## 2021-11-22 NOTE — PROGRESS NOTES
Pulmonary and Critical Care  Progress Note      VITALS:  BP (!) 110/52   Pulse 102   Temp 101.4 °F (38.6 °C) (Rectal)   Resp 17   Ht 5' 9.02\" (1.753 m)   Wt 224 lb 10.4 oz (101.9 kg)   SpO2 95%   BMI 33.16 kg/m²     Subjective:   CHIEF COMPLAINT :SOB     HPI:                The patient is on the vent and sedated. He is still requiring high fio2. He has minimal response to painful stimuli    Objective:   PHYSICAL EXAM:    LUNGS:Occasional basal crackles  Abd-soft, BS+,NT  Ext- no pedal edema  CVS-s1s2, no murmurs      DATA:    CBC:  Recent Labs     11/20/21  0510 11/21/21  0510 11/22/21  0525   WBC 8.8 7.7 7.8   RBC 3.02* 3.10* 3.13*   HGB 9.4* 9.8* 10.0*   HCT 29.3* 30.5* 31.1*    157 153   MCV 97.0 98.4 99.4   MCH 31.1* 31.6* 31.9*   MCHC 32.1 32.1 32.2   RDW 14.5 15.1* 15.0*   SEGSPCT 85.4* 75.0* 86.0*   BANDSPCT  --  10 2*      BMP:  Recent Labs     11/20/21  0510 11/21/21  0510 11/22/21  0525   * 152* 152*   K 3.6 3.7 3.7    109 104   CO2 32 34* 39*   BUN 49* 51* 50*   CREATININE 0.9 0.9 0.8*   CALCIUM 9.0 8.9 9.1   GLUCOSE 141* 128* 120*      ABG:  Recent Labs     11/20/21  0600 11/21/21  0600 11/22/21  0600   PH 7.45 7.47* 7.40   PO2ART 73* 136* 73*   PTV4NWB 54.0* 60.0* 76.0*   O2SAT 93.8* 95.1* 94.0*     BNP  No results found for: BNP   D-Dimer:  Lab Results   Component Value Date    DDIMER 837 (H) 10/29/2021      Radiology: The ET tube was in satisfactory position, 4.6 cm above the eladia.       NG tube is in the gastric body.       Right jugular central venous line with the tip in the superior vena cava.       Bilateral pulmonary edema and or pneumonia without cardiomegaly.  Trace   effusions may be blunting each costophrenic angle.       No significant change from 11/19/2021     1.        Assessment/Plan     Patient Active Problem List    Diagnosis Date Noted    Acute respiratory failure with hypoxia ([facility] [address] 11/16/2021    Cardiac arrest with ventricular fibrillation ([facility] [address] 11/16/2021    Septic shock ([facility] [address] 11/16/2021    Hematuria 11/16/2021    Pneumonia due to COVID-19 virus 10/27/2021     Hypernatremia  s/p In hospital Cardiac arrest  VDRF  Acute Hypoxic resp failure- worsened  Bilateral Pneumonia sec to COVID-19  Obesity  MARIA ELENA- improved     1. Free water  2. BMP in am  3. Tube feeds  4. Decadron  5. Insulin  6. Inhalers  7. Keep sats >> 88%  8. CPT  9. PT/OT  10. SAT and SBT trial as tolerated  11.  C.w present management    Electronically signed by Reanna Colon MD on 11/22/2021 at 12:52 PM

## 2021-11-22 NOTE — PLAN OF CARE
Spoke to [address] stated pt moving to 2115- will call-    called [facility] working on patient and getting situated,,  will call later when pt is more stable /   concern for pt- vent settings too high at this time  Relayed this information to Jeffery in [address] as well.   1636 hrs yb

## 2021-11-22 NOTE — CONSULTS
Department of General Surgery   Surgical Service Dr. Margo Canas   Consult Note    Date of Consult: 11/22/21    Reason for Consult:  Evaluate for Trach/PEG  Requesting Physician:  Dr. Maryana Burt:  Respiratory failure due to covid    History Obtained From:  electronic medical record    HISTORY OF PRESENT ILLNESS:    The patient is a 78 y.o. male who presented 10/27 with shortness of breath and covid. He was intubated on 11/8. He has remained on high ventilator settings. FIO2 100% and peep 14 today. He remains sedated.           Past Medical History:    Past Medical History:   Diagnosis Date    Hyperlipidemia     Hypertension        Past Surgical History:    Past Surgical History:   Procedure Laterality Date    ANKLE SURGERY      HAND SURGERY      KNEE SURGERY         Current Medications:   Current Facility-Administered Medications   Medication Dose Route Frequency Provider Last Rate Last Admin    dexmedetomidine (PRECEDEX) 400 mcg in sodium chloride 0.9 % 100 mL infusion  0.2-1.4 mcg/kg/hr IntraVENous Continuous Kalina Plaza MD 7.64 mL/hr at 11/22/21 1453 0.3 mcg/kg/hr at 11/22/21 1453    metoprolol (LOPRESSOR) injection 5 mg  5 mg IntraVENous Once Maico Schmidt MD        methylPREDNISolone sodium (SOLU-MEDROL) injection 70 mg  70 mg IntraVENous Daily Satya Talavera MD   70 mg at 11/22/21 0835    ceftazidime-avibactam (AVYCAZ) 2.5 g in dextrose 5 % 100 mL IVPB  2.5 g IntraVENous Q8H Satya Talavera MD 50 mL/hr at 11/22/21 1453 Rate Verify at 11/22/21 1453    minocycline (MINOCIN;DYNACIN) capsule 100 mg  100 mg Oral BID Satya Talavera MD   100 mg at 11/22/21 0835    midazolam (VERSED) 100 mg in dextrose 5 % 100 mL infusion  1-10 mg/hr IntraVENous Continuous Maico Schmidt MD 5 mL/hr at 11/22/21 0736 5 mg/hr at 11/22/21 0736    magnesium sulfate 1000 mg in dextrose 5% 100 mL IVPB  1,000 mg IntraVENous PRN Tara Alcantara MD   Stopped at 11/12/21 2031    fentaNYL (SUBLIMAZE) 1,000 mcg in sodium chloride 0.9% 100 mL infusion  12.5-200 mcg/hr IntraVENous Continuous Chelsie Alvarez MD 20 mL/hr at 11/22/21 1138 200 mcg/hr at 11/22/21 1138    sodium chloride flush 0.9 % injection 5-40 mL  5-40 mL IntraVENous PRN Gisele Doty MD        lidocaine (XYLOCAINE) 2 % jelly   Topical Once Gisele Doty MD        ipratropium (ATROVENT HFA) 17 MCG/ACT inhaler 4 puff  4 puff Inhalation Q4H WA Gisele Doty MD   4 puff at 11/22/21 1539    tamsulosin (FLOMAX) capsule 0.4 mg  0.4 mg Oral Daily Maine Lacey PA-C   0.4 mg at 11/22/21 0835    ALPRAZolam (XANAX) tablet 0.25 mg  0.25 mg Oral Q8H PRN Chelsie Alvarez MD   0.25 mg at 11/07/21 1543    metoclopramide (REGLAN) injection 10 mg  10 mg IntraVENous Q8H Martin Macias MD   10 mg at 11/22/21 1230    0.9 % sodium chloride infusion  25 mL IntraVENous PRN Martin Macias MD   Stopped at 11/21/21 1729    pantoprazole (PROTONIX) injection 40 mg  40 mg IntraVENous Daily Martin Macias MD   40 mg at 11/22/21 0834    atorvastatin (LIPITOR) tablet 10 mg  10 mg Oral Daily Nile Paul MD   10 mg at 11/22/21 0835    sodium chloride flush 0.9 % injection 5-40 mL  5-40 mL IntraVENous 2 times per day Melissa Mistry MD   10 mL at 11/22/21 0836    sodium chloride flush 0.9 % injection 5-40 mL  5-40 mL IntraVENous PRN Nile Paul MD   10 mL at 11/06/21 0820    enoxaparin (LOVENOX) injection 30 mg  30 mg SubCUTAneous BID Nlie Paul MD   30 mg at 11/22/21 0835    ondansetron (ZOFRAN-ODT) disintegrating tablet 4 mg  4 mg Oral Q8H PRN Nile Paul MD   4 mg at 11/01/21 0307    Or    ondansetron (ZOFRAN) injection 4 mg  4 mg IntraVENous Q6H PRN Nile Paul MD   4 mg at 11/06/21 0817    polyethylene glycol (GLYCOLAX) packet 17 g  17 g Oral Daily PRN Melissa Mistry MD   17 g at 11/05/21 2308    acetaminophen (TYLENOL) tablet 650 mg  650 mg Oral Q6H PRN Nile Paul MD   650 mg at 11/21/21 0800    Or    acetaminophen (TYLENOL) suppository 650 mg  650 mg Rectal Q6H PRN Nile Paul MD   650 mg at 11/22/21 1355       Allergies:  Amoxicillin    Social History:   Social History     Socioeconomic History    Marital status: Legally      Spouse name: None    Number of children: None    Years of education: None    Highest education level: None   Occupational History    None   Tobacco Use    Smoking status: Never Smoker    Smokeless tobacco: None   Substance and Sexual Activity    Alcohol use: Yes     Comment: occasional    Drug use: No    Sexual activity: None   Other Topics Concern    None   Social History Narrative    None     Social Determinants of Health     Financial Resource Strain:     Difficulty of Paying Living Expenses: Not on file   Food Insecurity:     Worried About Running Out of Food in the Last Year: Not on file    Shan of Food in the Last Year: Not on file   Transportation Needs:     Lack of Transportation (Medical): Not on file    Lack of Transportation (Non-Medical):  Not on file   Physical Activity:     Days of Exercise per Week: Not on file    Minutes of Exercise per Session: Not on file   Stress:     Feeling of Stress : Not on file   Social Connections:     Frequency of Communication with Friends and Family: Not on file    Frequency of Social Gatherings with Friends and Family: Not on file    Attends Yarsanism Services: Not on file    Active Member of [employer] or Organizations: Not on file    Attends Club or Organization Meetings: Not on file    Marital Status: Not on file   Intimate Partner Violence:     Fear of Current or Ex-Partner: Not on file    Emotionally Abused: Not on file    Physically Abused: Not on file    Sexually Abused: Not on file   Housing Stability:     Unable to Pay for Housing in the Last Year: Not on file    Number of [place] in the Last Year: Not on file    Unstable Housing in the Last Year: Not on file       Family History: History reviewed. No pertinent family history. REVIEW OF SYSTEMS:    Unable to obtain in ventilated patient    PHYSICAL EXAM:  Vitals:    11/22/21 1430 11/22/21 1450 11/22/21 1500 11/22/21 1539   BP:  (!) 96/55 (!) 98/55    Pulse: 98  88 83   Resp: 18  15 20   Temp: 100.2 °F (37.9 °C)      TempSrc:       SpO2: 94%  95% 96%   Weight:       Height:           Physical Exam  General: sedated  HEENT: mucous membranes moist  Respiratory: via ETT/vent  CV: appears well perfused, regular rate and rhythm  Abdomen: Soft, non-distended. No obvious upper abdominal scars  Skin: warm and dry  Extremities: atraumatic  Neuro: sedated  Psych: mood unable to assess        DATA:    Lab Results   Component Value Date    WBC 7.8 11/22/2021    HGB 10.0 (L) 11/22/2021    HCT 31.1 (L) 11/22/2021    MCV 99.4 11/22/2021     11/22/2021     Lab Results   Component Value Date     11/22/2021    K 3.7 11/22/2021     11/22/2021    CO2 39 11/22/2021    BUN 50 11/22/2021    CREATININE 0.8 11/22/2021    GLUCOSE 120 11/22/2021    CALCIUM 9.1 11/22/2021          IMPRESSION:    78 y.o. male with respiratory failure from covid requiring prolonged intubation. Vent settings remain too high to consider tracheostomy.     Patient Active Problem List:     Pneumonia due to COVID-19 virus     Acute respiratory failure with hypoxia ([address]     Cardiac arrest with ventricular fibrillation ([address]     Septic shock ([address]     Hematuria        PLAN:  - will follow peripherally, can consider trach/PEG once FIO2 50-60% and peep 10 or less        Electronically signed by Papito Canales MD on 11/22/2021 at 4:51 PM

## 2021-11-22 NOTE — PLAN OF CARE
Perfect served Dr. Alice Clifford about the US orders for this PT. Conversation as follows:    Tech: \"Good evening, STAT orders for Bilateral UE venous and Bilateral LE venous were ordered for this PT. PT has switched rooms and the RN for his new room stated that PT was unstable at the moment. Would it be okay to change these orders to routine until PT has stablized? \"    Dr. Alice Clifford: Olamide Caul. \"    Tech: \" Fayrene Solders. Thank you. We will periodically check the PTs status with the RN and go portable to perform the US as soon as the PT is stable. Have a good evening. \"    Orders changed to routine at this time and will check w/ RN about PT stability a little later.

## 2021-11-22 NOTE — PROGRESS NOTES
Pt agitated and restless, -180s, BP elevated, O2 sat 84%. Pt fiO2 placed at 100% and deep ETT suctioned with little improvement. Increased Versed to 6mg/hr and gave 2mg bolus per MAR orders. Notified Dr. Kristyn Mondragon of need for additional sedation for pt. Telephone orders to start Precedex gtt.

## 2021-11-22 NOTE — PROGRESS NOTES
11/22/21 0754   Vent Information   $Ventilation $Subsequent Day   Vent Type 840   Vent Mode AC/PC   Pressure Ordered 35   Rate Set 20 bmp   FiO2  100 %  (increased from 90% due to desaturations to 83%)   SpO2 (!) 87 %   SpO2/FiO2 ratio 87   Sensitivity 3   PEEP/CPAP 14   Humidification Source HME   Vent Patient Data   High Peep/I Pressure 35   Peak Inspiratory Pressure 50 cmH2O   Mean Airway Pressure 26 cmH20   Rate Measured 29 br/min   Vt Exhaled 593 mL   Minute Volume 13.2 Liters   I:E Ratio 1:2   Cough/Sputum   Sputum How Obtained Endotracheal; Suctioned   $Obtained Sample $Induced Sputum   Cough Productive   Frequency Occasional   Sputum Amount Moderate   Sputum Color Creamy; Yellow   Tenacity Thick   Spontaneous Breathing Trial (SBT) RT Doc   Pulse 141   Additional Respiratory  Assessments   Resp 29   Position Semi-Conteh's   Alarm Settings   High Pressure Alarm 55 cmH2O   Press Low Alarm   (-)   Delay Alarm 20 sec(s)   Low Minute Volume Alarm 2.5 L/min   Apnea (secs) 20 secs   High Respiratory Rate 45 br/min   Resp Rate Low Alarm   (-)   Low Exhaled Vt  300 mL   ETT (adult)   Placement Date/Time: 11/08/21 1122   Preoxygenation: Yes  Mask Ventilation: Ventilated by mask (1)  Technique: Direct laryngoscopy  Type: Cuffed  Tube Size: 8 mm  Laryngoscope: Mac  Blade Size: 3  Location: Oral  Insertion attempts: 1  Placement Verif. ..    Secured at 25 cm   Measured From [address] [place] [address],[address] By Commercial tube culver   Site Condition Dry   Cuff Pressure   (mov)

## 2021-11-22 NOTE — PROGRESS NOTES
Called report to WILBERT Calvert. Notified Krissy Linares, Ki Jones, and Marty Arreguin (pt emergency contacts) of room change.

## 2021-11-22 NOTE — PROGRESS NOTES
Hospitalist      Communicated with Dr. Ti English. Tracheostomy is appropriate at this point if family wishes to proceed as pt was intubated on 11/8. Left voicemail for daughter Alana Stone and son Valentín Arvizu.

## 2021-11-22 NOTE — PROGRESS NOTES
11/22/21 1145   Vent Information   Vent Type 840   Vent Mode AC/PC   Vt Ordered   (-)   Pressure Ordered 35   Rate Set 20 bmp   FiO2  100 %   SpO2 95 %   SpO2/FiO2 ratio 95   Sensitivity 3   PEEP/CPAP 14   Humidification Source HME   Vent Patient Data   High Peep/I Pressure 35   Peak Inspiratory Pressure 49 cmH2O   Mean Airway Pressure 25 cmH20   Rate Measured 22 br/min   Vt Exhaled 568 mL   Minute Volume 8.13 Liters   I:E Ratio 1:2.8   Spontaneous Breathing Trial (SBT) RT Doc   Pulse 104   Additional Respiratory  Assessments   Resp 22   Position Semi-Conteh's   Alarm Settings   High Pressure Alarm 55 cmH2O   Delay Alarm 20 sec(s)   Low Minute Volume Alarm 2.5 L/min   Apnea (secs) 20 secs   High Respiratory Rate 45 br/min   Low Exhaled Vt  300 mL   ETT (adult)   Placement Date/Time: 11/08/21 1122   Preoxygenation: Yes  Mask Ventilation: Ventilated by mask (1)  Technique: Direct laryngoscopy  Type: Cuffed  Tube Size: 8 mm  Laryngoscope: Mac  Blade Size: 3  Location: Oral  Insertion attempts: 1  Placement Verif. ..    Secured at 25 cm   Measured From [address] [place] [address],[address] By Commercial tube culver   Site Condition Dry

## 2021-11-22 NOTE — PROGRESS NOTES
Nephrology Progress Note  11/22/2021 9:38 AM  Subjective: Interval History: Roma Ingram is a 78 y.o. male on ventilator and more restless with increase in blood pressure and heart rate      Data:   Scheduled Meds:   metoprolol  5 mg IntraVENous Once    methylPREDNISolone  70 mg IntraVENous Daily    ceftazidime-acibactam (AVYCAZ) infusion  2.5 g IntraVENous Q8H    minocycline  100 mg Oral BID    lidocaine   Topical Once    ipratropium  4 puff Inhalation Q4H WA    tamsulosin  0.4 mg Oral Daily    metoclopramide  10 mg IntraVENous Q8H    pantoprazole  40 mg IntraVENous Daily    atorvastatin  10 mg Oral Daily    sodium chloride flush  5-40 mL IntraVENous 2 times per day    enoxaparin  30 mg SubCUTAneous BID     Continuous Infusions:   dexmedetomidine (PRECEDEX) IV infusion 0.2 mcg/kg/hr (11/22/21 0906)    midazolam 5 mg/hr (11/22/21 0736)    fentaNYL 200 mcg/hr (11/22/21 0733)    sodium chloride Stopped (11/21/21 1729)         CBC   Recent Labs     11/20/21 0510 11/21/21 0510 11/22/21 0525   WBC 8.8 7.7 7.8   HGB 9.4* 9.8* 10.0*   HCT 29.3* 30.5* 31.1*    157 153      BMP   Recent Labs     11/20/21 0510 11/21/21  0510 11/22/21 0525   * 152* 152*   K 3.6 3.7 3.7    109 104   CO2 32 34* 39*   PHOS 3.1 3.1 2.7   BUN 49* 51* 50*   CREATININE 0.9 0.9 0.8*     Hepatic:   Recent Labs     11/20/21  0510   AST 15   ALT 19   BILITOT 0.5   ALKPHOS 67     Troponin: No results for input(s): TROPONINI in the last 72 hours. BNP: No results for input(s): BNP in the last 72 hours. Lipids: No results for input(s): CHOL, HDL in the last 72 hours. Invalid input(s): LDLCALCU  ABGs:   Lab Results   Component Value Date    PO2ART 73 11/22/2021    DIG6ZUH 76.0 11/22/2021     INR: No results for input(s): INR in the last 72 hours.   Renal Labs  Albumin:    Lab Results   Component Value Date    LABALBU 3.5 11/22/2021     Calcium:    Lab Results   Component Value Date    CALCIUM 9.1 11/22/2021 Phosphorus:    Lab Results   Component Value Date    PHOS 2.7 11/22/2021     U/A:    Lab Results   Component Value Date    NITRU NEGATIVE 11/21/2021    COLORU YELLOW 11/21/2021    WBCUA 3 11/21/2021    RBCUA 157 11/21/2021    MUCUS OCCASIONAL 11/21/2021    TRICHOMONAS NONE SEEN 11/21/2021    BACTERIA OCCASIONAL 11/21/2021    CLARITYU HAZY 11/21/2021    SPECGRAV 1.020 11/21/2021    UROBILINOGEN NEGATIVE 11/21/2021    BILIRUBINUR NEGATIVE 11/21/2021    BLOODU LARGE 11/21/2021    KETUA NEGATIVE 11/21/2021     ABG:    Lab Results   Component Value Date    HWM1RKN 76.0 11/22/2021    PO2ART 73 11/22/2021    YNA9HJP 47.1 11/22/2021     HgBA1c:  No results found for: LABA1C  Microalbumen/Creatinine ratio:  No components found for: RUCREAT  TSH:  No results found for: TSH  IRON:    Lab Results   Component Value Date    IRON 68 11/15/2021     Iron Saturation:  No components found for: PERCENTFE  TIBC:    Lab Results   Component Value Date    TIBC 144 11/15/2021     FERRITIN:    Lab Results   Component Value Date    FERRITIN 2,544 11/16/2021     RPR:  No results found for: RPR  LINDA:  No results found for: ANATITER, LINAD  24 Hour Urine for Creatinine Clearance:  No components found for: CREAT4, UHRS10, UTV10      Objective:   I/O: 11/21 0701 - 11/22 0700  In: 2139.6 [I.V.:637.4]  Out: 4025 [Urine:4025]  I/O last 3 completed shifts: In: 2139.6 [I.V.:637.4; NG/GT:1167; IV Piggyback:335.2]  Out: 3193 [Urine:4025]  I/O this shift:  In: 1480 [NG/GT:1480]  Out: 275 [Urine:275]  Vitals: BP (!) 166/70   Pulse 116   Temp 100.9 °F (38.3 °C)   Resp 21   Ht 5' 9.02\" (1.753 m)   Wt 224 lb 10.4 oz (101.9 kg)   SpO2 92%   BMI 33.16 kg/m²  {  General appearance: Sedated ET tube  HEENT: Head: Normal, normocephalic, atraumatic.   Neck: supple, symmetrical, trachea midline  Lungs: diminished breath sounds bilaterally  Heart: S1, S2 normal  Abdomen: abnormal findings:  soft nt  Extremities: edema trace  Neurologic: Mental status: alertness: Sedated        Assessment and Plan:      IMP:  1.  Low urine output risk of acute renal failure/hematuria  #2 moderate protein malnutrition  #3 v. fib cardiac arrest  #4 hypernatremia  #5 respiratory failure  #6 COVID-19 positive  #7 hypertension    Plan     #1 improved urine output overall we will monitor for now keep negative balance renal function stable  #2 on tube feeds currently on hold is try to make oxygenation better  #3 currently on the ventilator try to adjust as increased oxygen requirements, will try to increase sedation monitor and give 1 dose of Cardizem as well for now as blood pressure increased  #4 maintain free water for sodium  #5 treated for Covid  #6 renal to follow           Gary Goel MD, MD

## 2021-11-22 NOTE — PROGRESS NOTES
11/22/21 1539   Vent Information   Equipment Changed HME  (and in-line sx)   Vent Type 980   Vent Mode AC/PC   Vt Ordered 0 mL   Pressure Ordered 35   Rate Set 20 bmp   Peak Flow 0 L/min   Pressure Support 0 cmH20   FiO2  100 %   SpO2 96 %   SpO2/FiO2 ratio 96   Sensitivity 3   PEEP/CPAP 14   I Time/ I Time % 1 s   Humidification Source HME   Vent Patient Data   High Peep/I Pressure 35   Peak Inspiratory Pressure 50 cmH2O   Mean Airway Pressure 26 cmH20   Rate Measured 21 br/min   Vt Exhaled 451 mL   Minute Volume 9.02 Liters   I:E Ratio 1:2.00   Spontaneous Breathing Trial (SBT) RT Doc   Pulse 83   Additional Respiratory  Assessments   Resp 20   Position Semi-Conteh's   Alarm Settings   High Pressure Alarm 55 cmH2O   Delay Alarm 20 sec(s)   Low Minute Volume Alarm 2.5 L/min   Apnea (secs) 20 secs   High Respiratory Rate 45 br/min   Low Exhaled Vt  300 mL   ETT (adult)   Placement Date/Time: 11/08/21 1122   Preoxygenation: Yes  Mask Ventilation: Ventilated by mask (1)  Technique: Direct laryngoscopy  Type: Cuffed  Tube Size: 8 mm  Laryngoscope: Mac  Blade Size: 3  Location: Oral  Insertion attempts: 1  Placement Verif. ..    Secured at 25 cm   Measured From [address] [place] [address],[address] By Commercial tube culver   Site Condition Dry   Cuff Pressure   (mov)

## 2021-11-22 NOTE — CARE COORDINATION
Spoke with Dr Janeen Tierney; concern for need of LTACH.  Will reach out to [facility], [facility] with referral. Sherryle Breeze, RN

## 2021-11-23 NOTE — PROGRESS NOTES
Nephrology Progress Note  11/23/2021 6:15 AM        Subjective:   Admit Date: 10/26/2021  PCP: Luke Lin MD    Interval History: Remains on vent    Diet: Tube feed per OG    ROS: Assist control with FiO2 100% PEEP of 14 and peak pressure of 49  Urine output recorded 1.8 L/day  T-max one 101.4 yesterday    Data:     Current meds:    metoprolol  5 mg IntraVENous Once    methylPREDNISolone  70 mg IntraVENous Daily    ceftazidime-acibactam (AVYCAZ) infusion  2.5 g IntraVENous Q8H    minocycline  100 mg Oral BID    lidocaine   Topical Once    ipratropium  4 puff Inhalation Q4H WA    tamsulosin  0.4 mg Oral Daily    metoclopramide  10 mg IntraVENous Q8H    pantoprazole  40 mg IntraVENous Daily    atorvastatin  10 mg Oral Daily    sodium chloride flush  5-40 mL IntraVENous 2 times per day    enoxaparin  30 mg SubCUTAneous BID      dexmedetomidine (PRECEDEX) IV infusion Stopped (11/22/21 2210)    midazolam 6 mg/hr (11/22/21 2101)    fentaNYL 200 mcg/hr (11/23/21 0502)    sodium chloride Stopped (11/21/21 1729)         I/O last 3 completed shifts:   In: 2268.6 [I.V.:576.5; NG/GT:1480; IV Piggyback:212.2]  Out: 2165 [Urine:2165]    CBC:   Recent Labs     11/21/21  0510 11/22/21  0525 11/23/21  0530   WBC 7.7 7.8 8.6   HGB 9.8* 10.0* 9.3*    153 127*          Recent Labs     11/21/21  0510 11/22/21  0525   * 152*   K 3.7 3.7    104   CO2 34* 39*   BUN 51* 50*   CREATININE 0.9 0.8*   GLUCOSE 128* 120*       Lab Results   Component Value Date    CALCIUM 9.1 11/22/2021    PHOS 2.7 11/22/2021       Objective:     Vitals: BP (!) 154/70   Pulse 98   Temp 98.4 °F (36.9 °C) (Rectal)   Resp 22   Ht 5' 9.02\" (1.753 m)   Wt 224 lb 10.4 oz (101.9 kg)   SpO2 94%   BMI 33.16 kg/m²     General appearance: Intubated, sedated with fentanyl and Versed  HEENT: 2-3+ conjunctival pallor  Neck: Right IJ central venous catheter  Lungs: Anterior exam only, adventitial breath sound  Heart: Tachycardia during my exam this morning  Abdomen: Soft  Extremities: Mild ankle edema  He has a Gómez catheter      Problem List :         Impression :     1. Acute kidney injury-his peak creatinine was 1.6 on November 13, 2021-recovering and nonoliguric-he remains in risk for recurrent acute kidney injury specially with requirement of high FiO2 and PEEP pressure and on vent  2. COVID-19 with ARDS and multiorgan dysfunction  3. Minimal comorbid disease which include hypertension hyperlipidemia    Recommendation/Plan  :     1. He has high risk for recurrent acute kidney injury  2. Also watch for iatrogenic nosocomial complication  3. Probably will need trach-with that high FiO2 and high peak pressure-unlikely he will be get off vent-at least in the near future  4.  Follow clinically and biochemically      Alan Green MD MD

## 2021-11-23 NOTE — PROGRESS NOTES
Pt tachycardic and o2 dropping in the 80's. Rt suctioned. Pt agitated. Dr Radha Quintanilla paged and was ordered to restart precedex gtt.

## 2021-11-23 NOTE — CONSULTS
[facility]  Consult Note       Patricia Farris  AGE: 78 y.o. GENDER: male  : 1955  TODAY'S DATE:  2021    Subjective:     Reason for  Evaluation and Assessment: wound care sergio Farris is a 78 y.o. male referred by:   [x] Physician  [] Nursing  [] Other:     Wound Identification:  Wound Type: no wounds  Contributing Factors: chronic pressure, decreased mobility, malnutrition and pt on a ventilator        PAST MEDICAL HISTORY        Diagnosis Date    Hyperlipidemia     Hypertension        PAST SURGICAL HISTORY    Past Surgical History:   Procedure Laterality Date    ANKLE SURGERY      HAND SURGERY      KNEE SURGERY         FAMILY HISTORY    History reviewed. No pertinent family history. SOCIAL HISTORY    Social History     Tobacco Use    Smoking status: Never Smoker    Smokeless tobacco: Not on file   Substance Use Topics    Alcohol use: Yes     Comment: occasional    Drug use: No       ALLERGIES    Allergies   Allergen Reactions    Amoxicillin        MEDICATIONS    No current facility-administered medications on file prior to encounter.      Current Outpatient Medications on File Prior to Encounter   Medication Sig Dispense Refill    lovastatin (MEVACOR) 10 MG tablet Take 10 mg by mouth nightly      losartan (COZAAR) 100 MG tablet Take 100 mg by mouth daily      atenolol (TENORMIN) 50 MG tablet Take 50 mg by mouth 2 times daily      HYDROcodone-acetaminophen (NORCO) 5-325 MG per tablet Take 1-2 tablets by mouth every 4 hours as needed for Pain 15 tablet 0         Objective:      BP (!) 156/76   Pulse 105   Temp 99 °F (37.2 °C) (Rectal)   Resp 20   Ht 5' 9.02\" (1.753 m)   Wt 222 lb 10.6 oz (101 kg)   SpO2 91%   BMI 32.87 kg/m²   Nirmal Risk Score: Nirmal Scale Score: 11    LABS    CBC:   Lab Results   Component Value Date    WBC 8.6 2021    RBC 2.97 2021    HGB 9.3 2021    HCT 29.6 2021    MCV 99.7 2021    MCH 31.3 11/23/2021    MCHC 31.4 11/23/2021    RDW 14.8 11/23/2021     11/23/2021    MPV 10.6 11/23/2021     CMP:    Lab Results   Component Value Date     11/23/2021    K 3.9 11/23/2021     11/23/2021    CO2 38 11/23/2021    BUN 57 11/23/2021    CREATININE 0.8 11/23/2021    GFRAA >60 11/23/2021    LABGLOM >60 11/23/2021    GLUCOSE 127 11/23/2021    PROT 6.6 11/20/2021    LABALBU 3.2 11/23/2021    CALCIUM 9.0 11/23/2021    BILITOT 0.5 11/20/2021    ALKPHOS 67 11/20/2021    AST 15 11/20/2021    ALT 19 11/20/2021     Albumin:    Lab Results   Component Value Date    LABALBU 3.2 11/23/2021     PT/INR:    Lab Results   Component Value Date    PROTIME 13.5 10/28/2021    INR 1.05 10/28/2021     HgBA1c:  No results found for: LABA1C      Assessment:     Patient Active Problem List   Diagnosis    Pneumonia due to COVID-19 virus    [facility] failure [facility]    Cardiac arrest with ventricular fibrillation [facility]    Septic shock ([facility]    Hematuria       Measurements:  Wound 11/22/21 Rectum (Active)   Wound Etiology Pressure Stage  2 11/23/21 1126   Dressing Status Clean; Dry; Intact 11/23/21 1126   Wound Cleansed Cleansed with saline 11/23/21 1030   Dressing/Treatment Protective barrier 11/23/21 1126   Wound Length (cm) 0 cm 11/23/21 1030   Wound Width (cm) 0 cm 11/23/21 1030   Wound Depth (cm) 0 cm 11/23/21 1030   Wound Surface Area (cm^2) 0 cm^2 11/23/21 1030   Wound Volume (cm^3) 0 cm^3 11/23/21 1030   Wound Assessment Pink/red 11/23/21 1126   Drainage Amount None 11/23/21 1126   Odor None 11/23/21 1126   Veronika-wound Assessment Blanchable erythema 11/23/21 1126   Number of days: 1       Response to treatment:  Well tolerated by patient.      Pain Assessment:  Severity:  Non-verbal on a ventilator  Quality of pain:   Wound Pain Timing/Severity:   Premedicated: yes    Plan:     Plan of Care: Wound 11/22/21 Rectum-Dressing/Treatment: Protective barrier     Pt in is on a ventilator eyes opened with family member in the room. Ok with wound eval. Wound care eval for sacrum wound. Pt tuned to left side no open wounds noted buttocks is intact. There appeared to be area to rt buttocks with dry barrier cream. Cleansed with NS. Applied silicone border for prevention. Heels intact. Pt turned to lt side. Pt is at high risk for skin breakdown AEB anita. Follow anita orders. Wound care to complete consult as there are no wounds. Specialty Bed Required : yes  [x] Low Air Loss   [] Pressure Redistribution  [] Fluid Immersion  [] Bariatric  [] Total Pressure Relief  [] Other:     Discharge Plan:  Placement for patient upon discharge: tbd  Hospice Care: no  Patient appropriate for Outpatient [address]: no    Patient/Caregiver Teaching:  Level of patient/caregiver understanding able to: cares explained as given. Pt's caregiver verbalized understanding. Electronically signed by Seth Mcdonnell.  WILBERT Mcgowan,  on 11/23/2021 at 12:15 PM

## 2021-11-23 NOTE — PROGRESS NOTES
Hospitalist Progress Note         Admit Date: 10/26/2021    PCP: Jennifer Ledezma MD     Chief Complaint   Patient presents with    Other     covid + low 02 at home symptoms started last thursday tested positive this weekend.  Shortness of Breath        Assessment and Plan:     Pneumonia due to COVID-19 virus  Acute hypoxic respiratory failure  ARDS  Septic shock  Cardiac arrest with V. Fib   Septic shock with multiorgan dysfunction  Acute renal failure  Hematuria  Moderate PCM    Patient completed Decadron, baricitinib and IVIG. ID on board. Completed broad-spectrum ABX vancomycin/Avycaz and minocycline. Patient was kept on pressors, IVF with electrolyte replacement as per nephro. Urology evaluated due to hematuria and currently resolved. Patient failed weaning trial multiple times and now on high vent settings. It is very unlikely that patient can get trach or PEG in the next 7 to 10 days   if hisO2 needs come down. VTE prophylaxis LMWH  GI prophylaxis Protonix IV  Diet tube feeds  CODE STATUS full code    Case discussed with fiancé and other family members at bedside regarding current critical illness. Care plan discussed with current care team, RN at bedside.   Total critical care time spent >35 minutes    Current Facility-Administered Medications   Medication Dose Route Frequency Provider Last Rate Last Admin    dexmedetomidine (PRECEDEX) 400 mcg in sodium chloride 0.9 % 100 mL infusion  0.2-1.4 mcg/kg/hr IntraVENous Continuous Shreya Núñez MD   Stopped at 11/22/21 2210    metoprolol (LOPRESSOR) injection 5 mg  5 mg IntraVENous Once Maico Schmidt MD        methylPREDNISolone sodium (SOLU-MEDROL) injection 70 mg  70 mg IntraVENous Daily Hi Sandhu MD   70 mg at 11/23/21 0741    ceftazidime-avibactam (AVYCAZ) 2.5 g in dextrose 5 % 100 mL IVPB  2.5 g IntraVENous Q8H Hi Sandhu MD 50 mL/hr at 11/23/21 0639 2.5 g at 11/23/21 0639    minocycline (MINOCIN;DYNACIN) capsule 100 mg  100 mg Oral BID Yun Monge MD   100 mg at 11/23/21 0741    midazolam (VERSED) 100 mg in dextrose 5 % 100 mL infusion  1-10 mg/hr IntraVENous Continuous Maico Schmidt MD 6 mL/hr at 11/22/21 2101 6 mg/hr at 11/22/21 2101    magnesium sulfate 1000 mg in dextrose 5% 100 mL IVPB  1,000 mg IntraVENous PRN Doreen Chaudhary MD   Stopped at 11/12/21 2031    fentaNYL (SUBLIMAZE) 1,000 mcg in sodium chloride 0.9% 100 mL infusion  12.5-200 mcg/hr IntraVENous Continuous Doreen Chaudhary MD 20 mL/hr at 11/23/21 0502 200 mcg/hr at 11/23/21 0502    sodium chloride flush 0.9 % injection 5-40 mL  5-40 mL IntraVENous PRN Kira Cortez MD        lidocaine (XYLOCAINE) 2 % jelly   Topical Once Kira Cortez MD        ipratropium (ATROVENT HFA) 17 MCG/ACT inhaler 4 puff  4 puff Inhalation Q4H WA Kira Cortez MD   4 puff at 11/23/21 0707    tamsulosin (FLOMAX) capsule 0.4 mg  0.4 mg Oral Daily Latrice Mejia PA-C   0.4 mg at 11/23/21 0741    ALPRAZolam (XANAX) tablet 0.25 mg  0.25 mg Oral Q8H PRN Doreen Chaudhary MD   0.25 mg at 11/07/21 1543    metoclopramide (REGLAN) injection 10 mg  10 mg IntraVENous Q8H Saul Christina MD   10 mg at 11/23/21 0541    0.9 % sodium chloride infusion  25 mL IntraVENous PRN Saul Christina MD   Stopped at 11/21/21 1729    pantoprazole (PROTONIX) injection 40 mg  40 mg IntraVENous Daily Saul Christina MD   40 mg at 11/23/21 0741    atorvastatin (LIPITOR) tablet 10 mg  10 mg Oral Daily Donnie WOLFE MD   10 mg at 11/23/21 0741    sodium chloride flush 0.9 % injection 5-40 mL  5-40 mL IntraVENous 2 times per day Melecio Murray MD   10 mL at 11/23/21 0742    sodium chloride flush 0.9 % injection 5-40 mL  5-40 mL IntraVENous PRN Nile Johnston MD   10 mL at 11/06/21 0820    enoxaparin (LOVENOX) injection 30 mg  30 mg SubCUTAneous BID Nile Johnston MD   30 mg at 11/23/21 0742    ondansetron (ZOFRAN-ODT) disintegrating tablet 4 mg  4 mg Oral Q8H PRN Arturo Aldana MD   4 mg at 11/01/21 8991    Or    ondansetron (ZOFRAN) injection 4 mg  4 mg IntraVENous Q6H PRN Nile Cazares MD   4 mg at 11/06/21 0817    polyethylene glycol (GLYCOLAX) packet 17 g  17 g Oral Daily PRN Arturo Aldana MD   17 g at 11/05/21 2308    acetaminophen (TYLENOL) tablet 650 mg  650 mg Oral Q6H PRN Nile Cazares MD   650 mg at 11/21/21 0800    Or    acetaminophen (TYLENOL) suppository 650 mg  650 mg Rectal Q6H PRN Nile Cazares MD   650 mg at 11/22/21 1355       Subjective:     Continued worsening of vent settings FiO2 100% and PEEP of 14  Current sats around 90%. Fiancé at bedside and care plan discussed. Currently on Precedex, fentanyl and Versed. Tolerating tube feeds. Objective:        Intake/Output Summary (Last 24 hours) at 11/23/2021 0803  Last data filed at 11/23/2021 0410  Gross per 24 hour   Intake 1502.74 ml   Output 1560 ml   Net -57.26 ml      Vitals:   Vitals:    11/23/21 0800   BP:    Pulse: 85   Resp: 18   Temp: 97.7 °F (36.5 °C)   SpO2: 95%     Physical Exam:  General Appearance:  Intubated and sedated  Head:      Normocephalic, without obvious abnormality, atraumatic  Eyes:       Conjunctiva/corneas clear, EOM's intact  Lungs:    B/L diffuse rhonchi and diminished BS at bases +  Heart:                RRR, S1 and S2 normal, no murmur,   rub or gallop  Abdomen:     Soft, non-tender, bowel sounds +  Extremities:   No edema/clubbing/cyanosis  Neurological:   Unable to assess while on sedation    Significant Diagnostic Studies:   DATA:    CBC   Recent Labs     11/21/21  0510 11/22/21  0525 11/23/21  0530   WBC 7.7 7.8 8.6   HGB 9.8* 10.0* 9.3*   HCT 30.5* 31.1* 29.6*    153 127*      BMP   Recent Labs     11/21/21  0510 11/22/21  0525 11/23/21  0530   * 152* 149*   K 3.7 3.7 3.9    104 104   CO2 34* 39* 38*   PHOS 3.1 2.7 2.7   BUN 51* 50* 57*   CREATININE 0.9 0.8* 0.8*     LFT'S No results for input(s): AST, ALT, ALB, BILIDIR, BILITOT, ALKPHOS in the last 72 hours. COAG No results for input(s): INR in the last 72 hours. POC:   Lab Results   Component Value Date    POCGLU 202 11/14/2021     [id number]:No results found for: LABA1C  CARDIAC ENZYMES  No results for input(s): CKTOTAL, CKMB, CKMBINDEX, TROPONINI in the last 72 hours. Troponin: No results for input(s): TROPONINT in the last 72 hours. BNP: No results for input(s): PROBNP in the last 72 hours. U/A:    Lab Results   Component Value Date    COLORU YELLOW 11/21/2021    WBCUA 3 11/21/2021    RBCUA 157 11/21/2021    MUCUS OCCASIONAL 11/21/2021    BACTERIA OCCASIONAL 11/21/2021    CLARITYU HAZY 11/21/2021    SPECGRAV 1.020 11/21/2021    LEUKOCYTESUR NEGATIVE 11/21/2021    BLOODU LARGE 11/21/2021       Echocardiogram complete 2D with doppler with color    Result Date: 11/12/2021  Transthoracic Echocardiography Report (TTE)  Demographics   Patient Name       Solis PALMA    Date of Study       11/12/2021   Date of Birth      1955         Gender              Male   Age                78 year(s)         Race                   Patient Number     [id number]         Room Number         2012   Visit Number       [id number]   Corporate ID       [id number]   Accession Number   [id number]         Renato Brown RVT   Ordering Physician Carol Haynes MD                 Physician           MD  Procedure Type of Study   TTE procedure:ECHOCARDIOGRAM COMPLETE 2D W DOPPLER W COLOR.   Procedure Date Date: 11/12/2021 Start: 12:53 PM Study Location: Portable Technical Quality: Adequate visualization Indications:Cardiac arrest. Patient Status: Routine Height: 69 inches Weight: 244 pounds BSA: 2.25 m2 BMI: 36.03 kg/m2 HR: 75 bpm BP: 105/57 mmHg  Conclusions   Summary  Left ventricular systolic function is normal.  Ejection fraction is visually estimated at 55%. Mildly dilated right ventricle. No significant valvular disease noted. No evidence of any pericardial effusion. Signature   ------------------------------------------------------------------  Electronically signed by Ki Hill MD (Interpreting  physician) on 11/12/2021 at 03:00 PM  ------------------------------------------------------------------   Findings   Left Ventricle  Left ventricular systolic function is normal.  Ejection fraction is visually estimated at 55%. Indeterminate diastolic function; E/A flow reversal is noted. Left ventricle size is normal.  No regional wall motion abnormalities. Left Atrium  Essentially normal left atrium. Right Atrium  Prominent Eustachian valve. Right Ventricle  Mildly dilated right ventricle. Aortic Valve  Structurally normal aortic valve. Mitral Valve  Structurally normal mitral valve. Tricuspid Valve  Structurally normal tricuspid valve. Pulmonic Valve  The pulmonic valve was not well visualized. Pericardial Effusion  No evidence of any pericardial effusion. Pleural Effusion  No evidence of pleural effusion. Miscellaneous  The aorta is within normal limits. Inferior vena cava is dilated, measuring at 2.9 cm, and does not collapse  with respiration.   M-Mode/2D Measurements & Calculations   LV Diastolic Dimension:  LV Systolic Dimension:  LA Dimension: 3.1 cmAO Root  4.91 cm                  3.19 cm                 Dimension: 3.5 cmLA Area:  LV FS:35 %               LV Volume Diastolic:    48.5 cm2  LV PW Diastolic: 0.43 cm 497 ml  LV PW Systolic: 7.27 cm  LV Volume Systolic: 48  Septum Diastolic: 1.57   ml  cm                       LV EDV/LV EDV Index:    RV Diastolic Dimension:  Septum Systolic: 1.92 cm 976 FL/61 m2LV ESV/LV   3.95 cm  CO: 9.05 l/min           ESV Index: 48 ml/21 m2  CI: 4.02 l/m*m2          EF Calculated (A4C):    LA/Aorta: 0.89                           56.4 %  LV Area Diastolic: 41.3  EF Calculated (2D): LA volume/Index: 54 ml  cm2                      64.1 %                  /67D7  LV Area Systolic: 84.5  cm2                      LV Length: 8.91 cm                            LVOT: 2.4 cm  Doppler Measurements & Calculations   MV Peak E-Wave: 62.2    AV Peak Velocity: 161 cm/s   LVOT Peak Velocity: 110  cm/s                    AV Peak Gradient: 10.37 mmHg cm/s  MV Peak A-Wave: 84.4    AV Mean Velocity: 124 cm/s   LVOT Mean Velocity:  cm/s                    AV Mean Gradient: 7 mmHg     87.2 cm/s  MV E/A Ratio: 0.74      AV VTI: 38.4 cm              LVOT Peak Gradient: 5  MV Peak Gradient: 1.55  AV Area (Continuity):3.14    mmHgLVOT Mean Gradient:  mmHg                    cm2                          3 mmHg   MV P1/2t: 52 msec       LVOT VTI: 26.7 cm  MVA by PHT:4.23 cm2   MV E' Septal Velocity:  6.91 cm/s  MV E' Lateral Velocity:  7.13 cm/s  MV E/E' septal: 9  MV E/E' lateral: 8.72      XR ABDOMEN (KUB) (SINGLE AP VIEW)    Result Date: 11/5/2021  EXAMINATION: ONE SUPINE XRAY VIEW(S) OF THE ABDOMEN 11/5/2021 9:04 pm COMPARISON: Lumbar spine radiographs 09/15/2015 HISTORY: ORDERING SYSTEM PROVIDED HISTORY: abdominal pain TECHNOLOGIST PROVIDED HISTORY: Reason for exam:->abdominal pain Reason for Exam: abdominal pain Acuity: Acute Type of Exam: Initial Additional signs and symptoms: na Relevant Medical/Surgical History: na FINDINGS: 2 images submitted for review. Nonspecific, nonobstructive bowel gas pattern. Punctate calcifications project over the kidneys. No acute osseous abnormality is seen. There are degenerative changes in the lumbar spine. Pelvic phleboliths are redemonstrated. 1. No evidence of bowel obstruction. 2. Suspected bilateral nephrolithiasis.      CT PELVIS WO CONTRAST Additional Contrast? None    Result Date: 11/7/2021  EXAMINATION: CT OF THE PELVIS WITHOUT CONTRAST 11/7/2021 1:29 am TECHNIQUE: CT of the pelvis was performed without the administration of intravenous contrast. Multiplanar reformatted images are provided for review. Adjustment of mA and/or kV according to patient size was utilized. Dose modulation, iterative reconstruction, and/or weight based adjustment of the mA/kV was utilized to reduce the radiation dose to as low as reasonably achievable. COMPARISON: None. HISTORY: ORDERING SYSTEM PROVIDED HISTORY: Dalton TECHNOLOGIST PROVIDED HISTORY: Reason for exam:->Dalton Additional Contrast?->None Reason for Exam: pulled on dalton Acuity: Acute Type of Exam: Initial FINDINGS: The pelvic ring is intact. There is no fracture. Degenerative changes are noted along the sacroiliac joints. The bladder is partially distended with urine. The Dalton catheter has been retracted with balloon inflated in the urethra along the penile portion. Prostate and seminal vesicles are unremarkable. No inguinal or pelvic sidewall adenopathy. Vascular calcifications are noted in the iliac arteries. No inguinal or pelvic sidewall adenopathy. There is a left inguinal hernia containing fat. No appreciable soft tissue swelling. 1. A Dalton catheter has been pulled back with the balloon portion inflated in the penile urethra. Repositioning is recommended. The catheter is not within the bladder. XR CHEST PORTABLE    Result Date: 11/22/2021  EXAMINATION: ONE XRAY VIEW OF THE CHEST 11/22/2021 8:12 am COMPARISON: 11/19/2021. HISTORY: ORDERING SYSTEM PROVIDED HISTORY: Hypoxia TECHNOLOGIST PROVIDED HISTORY: Reason for exam:->Hypoxia Reason for Exam: Hypoxia FINDINGS: The ET tube was in satisfactory position, 4.6 cm above the eladia. The NG tube is in the gastric body. A right jugular central venous line was noted with the tip in the superior vena cava. Bilateral pulmonary edema and or pneumonia was noted. Trace effusions may be blunting each costophrenic angle. The regional skeleton was unremarkable. No significant change has occurred from 11/19/2021.      The ET tube was in satisfactory position, 4.6 cm above the eladia. NG tube is in the gastric body. Right jugular central venous line with the tip in the superior vena cava. Bilateral pulmonary edema and or pneumonia without cardiomegaly. Trace effusions may be blunting each costophrenic angle. No significant change from 11/19/2021. XR CHEST PORTABLE    Result Date: 11/19/2021  EXAMINATION: ONE XRAY VIEW OF THE CHEST 11/19/2021 5:19 am COMPARISON: 11/17/2021 HISTORY: ORDERING SYSTEM PROVIDED HISTORY: Hypoxia TECHNOLOGIST PROVIDED HISTORY: Reason for exam:->Hypoxia Reason for Exam: Hypoxia Acuity: Unknown Type of Exam: Subsequent/Follow-up Additional signs and symptoms: Hypoxia Relevant Medical/Surgical History: Hypoxia FINDINGS: The right costophrenic angle is excluded from the exam.  Cardiomediastinal silhouette is stable. Similar position of devices. Similar bilateral airspace opacities. No pleural effusion or pneumothorax. No gross bony abnormality. Stable chest.     XR CHEST PORTABLE    Result Date: 11/17/2021  EXAMINATION: ONE XRAY VIEW OF THE CHEST 11/17/2021 6:51 am COMPARISON: 11/15/2021 HISTORY: ORDERING SYSTEM PROVIDED HISTORY: Hypoxia TECHNOLOGIST PROVIDED HISTORY: Reason for exam:->Hypoxia Reason for Exam: Hypoxia Acuity: Unknown Type of Exam: Unknown FINDINGS: The endotracheal tube appears in similar position just above the thoracic inlet. The enteric catheter is coiled in the gastric fundus. A right-sided small bore central venous catheter projects over the superior vena cava. Cardiac mediastinal silhouettes appear stable. Extensive multifocal infiltrates are seen throughout the lungs bilaterally. Osseous structures appear similar. Again identified are diffuse multifocal infiltrates throughout the lungs bilaterally with a similar appearance.      XR CHEST PORTABLE    Result Date: 11/16/2021  EXAMINATION: ONE XRAY VIEW OF THE CHEST 11/15/2021 5:20 am COMPARISON: November 14, 2021 HISTORY: ORDERING SYSTEM PROVIDED HISTORY: ett placement TECHNOLOGIST PROVIDED HISTORY: Reason for exam:->ett placement Reason for Exam: ett placement Acuity: Unknown Type of Exam: Unknown FINDINGS: Stable lines and tubes including endotracheal tube, nasogastric tube, and right internal jugular central venous catheter. Stable cardiomediastinal silhouette. The pulmonary system demonstrates patchy ground-glass airspace opacities with small effusions. No pneumothorax. No new osseous abnormality. 1. Stable lines, tubes, and support devices. 2. Unchanged patchy ground-glass opacities with small effusions. XR CHEST PORTABLE    Result Date: 11/14/2021  EXAMINATION: ONE XRAY VIEW OF THE CHEST 11/14/2021 5:58 am COMPARISON: 11/13/2021 HISTORY: ORDERING SYSTEM PROVIDED HISTORY: ett placement TECHNOLOGIST PROVIDED HISTORY: Reason for exam:->ett placement Reason for Exam: ett placement Acuity: Unknown Type of Exam: Unknown FINDINGS: Again seen is an endotracheal tube with the tip at the level of the clavicular heads. There is redemonstration of right IJ central venous catheter an enteric tube, appearing grossly similar to the prior study. The cardiac silhouette appears magnified. There is redemonstration of diffuse interstitial and airspace opacities, appearing grossly similar to the prior study. There appears to be trace left pleural effusion. No evidence of pneumothorax seen. Redemonstration of diffuse bilateral interstitial and airspace opacities, which may reflect multifocal pneumonia or pulmonary edema, appearing grossly similar to the prior study. Supporting devices as above, appearing grossly similar to the prior study.      XR CHEST PORTABLE    Result Date: 11/13/2021  EXAMINATION: ONE XRAY VIEW OF THE CHEST 11/13/2021 6:07 am COMPARISON: Recent studies HISTORY: ORDERING SYSTEM PROVIDED HISTORY: ett placement TECHNOLOGIST PROVIDED HISTORY: Reason for exam:->ett placement Reason for Exam: ett placement Acuity: Unknown Type of Exam: opacities. Lines and tubes are acceptable. XR CHEST PORTABLE    Result Date: 11/10/2021  EXAMINATION: ONE XRAY VIEW OF THE CHEST 11/9/2021 4:09 am COMPARISON: 11/08/2021 HISTORY: ORDERING SYSTEM PROVIDED HISTORY: ett placement TECHNOLOGIST PROVIDED HISTORY: Reason for exam:->ett placement Reason for Exam: vent Acuity: Acute Type of Exam: Ongoing FINDINGS: The endotracheal tube, nasogastric tube and right IJ catheter remain. There is no pneumothorax. Moderate to severe bilateral airspace disease appears similar to the previous exam.  A pneumothorax is not appreciated. No change in life support. Moderate to severe bilateral airspace disease, similar to the prior exam.     XR CHEST PORTABLE    Result Date: 11/10/2021  EXAMINATION: ONE XRAY VIEW OF THE CHEST 11/10/2021 3:50 am COMPARISON: Chest radiograph 11/09/2021 HISTORY: ORDERING SYSTEM PROVIDED HISTORY: ett placement TECHNOLOGIST PROVIDED HISTORY: Reason for exam:->ett placement Reason for Exam: vent Acuity: Acute Type of Exam: Ongoing FINDINGS: cardiomediastinal silhouette is unchanged. No definite pleural effusion or pneumothorax. Bilateral perihilar interstitial airspace opacities are unchanged. No acute osseous abnormality. Endotracheal tube in place with tip approximately 5 cm above the eladia. Enteric tube in place with tip and side port below the diaphragm and outside the field of view of this study. Right IJ central venous catheter in place with tip at the superior cavoatrial junction, also unchanged. Similar appearance of extensive bilateral airspace opacities, Endotracheal tube in appropriate position in the midthoracic trachea. Remaining tubes and lines are also unchanged.      XR CHEST PORTABLE    Result Date: 11/9/2021  EXAMINATION: ONE X-RAY VIEW OF THE CHEST 11/8/2021 1:16 pm COMPARISON: 11/05/2021 HISTORY: ORDERING SYSTEM PROVIDED HISTORY: ETT placement TECHNOLOGIST PROVIDED HISTORY: Reason for exam:->ett placement Reason for Exam: ETT and ng placement Type of Exam: Subsequent/Follow-up Initial evaluation FINDINGS: Monitor wires overlie the chest.  The patient has been intubated. The tip of the endotracheal tube is at the T4 level. There is a new right internal jugular central venous line with the tip in the superior vena cava. There is no pneumothorax. An NG tube courses below the level of the diaphragm and the tip is not included on this film. Patchy bilateral pulmonary opacities are unchanged. Endotracheal tube with the tip at T4. New right internal jugular central venous line with the tip in the superior vena cava. No pneumothorax. Bilateral pulmonary opacities are unchanged. XR CHEST PORTABLE    Result Date: 11/5/2021  EXAMINATION: ONE XRAY VIEW OF THE CHEST 11/5/2021 9:04 pm COMPARISON: 11/04/2021 HISTORY: ORDERING SYSTEM PROVIDED HISTORY: f/u on Trace biapical pneumothoraces pneumomediastinum and pneumopericardium TECHNOLOGIST PROVIDED HISTORY: Reason for exam:->f/u on Trace biapical pneumothoraces pneumomediastinum and pneumopericardium Reason for Exam: f/u on Trace biapical pneumothoraces pneumomediastinum and pneumopericardium Acuity: Acute Type of Exam: Initial Additional signs and symptoms: na Relevant Medical/Surgical History: hypertension FINDINGS: The cardiac silhouette and mediastinal contours are stable. There are bilateral lung infiltrates, unchanged. There is a tiny right apical pneumothorax. No left pneumothorax is identified. Pneumomediastinum is again noted. Subcutaneous emphysema is noted in the supraclavicular regions. The visualized osseous structures are unremarkable. 1. Bilateral lung infiltrates, compatible with pneumonia. 2. Trace right apical pneumothorax. 3. Pneumomediastinum. 4. No left pneumothorax.      XR CHEST PORTABLE    Result Date: 11/4/2021  EXAMINATION: ONE XRAY VIEW OF THE CHEST 11/4/2021 1:57 am COMPARISON: Chest radiograph dated November 3, 2021 HISTORY: [address] [place] [address] HISTORY: Acute hypoxemic respiratory failure, Covid 19 pneumonia TECHNOLOGIST PROVIDED HISTORY: Reason for exam:->Acute hypoxemic respiratory failure, Covid 19 pneumonia Reason for Exam: Acute hypoxemic respiratory failure, Covid 19 pneumonia Acuity: Unknown Type of Exam: Subsequent/Follow-up Additional signs and symptoms: Acute hypoxemic respiratory failure, Covid 19 pneumonia Relevant Medical/Surgical History: Acute hypoxemic respiratory failure, Covid 19 pneumonia FINDINGS: Pneumomediastinum and pneumopericardium are noted without significant change. There is a small stable right apical pneumothorax. Trace left apical pneumothorax is noted. Bilateral airspace opacities are seen without significant change. Subcutaneous emphysematous changes are noted overlying the base of the neck. 1. Diffuse bilateral airspace opacities without significant change. 2. Trace biapical pneumothoraces pneumomediastinum and pneumopericardium with subcutaneous emphysematous changes involving the base the neck. No significant change. XR CHEST PORTABLE    Result Date: 11/3/2021  EXAMINATION: ONE XRAY VIEW OF THE CHEST 11/3/2021 6:34 am COMPARISON: Chest radiograph dated November 1, 2021 HISTORY: ORDERING SYSTEM PROVIDED HISTORY: Bilateral Covid pneumonia, acute hypoxemic respiratory failure TECHNOLOGIST PROVIDED HISTORY: Reason for exam:->Bilateral Covid pneumonia, acute hypoxemic respiratory failure Reason for Exam: Bilateral Covid pneumonia, acute hypoxemic respiratory failure Acuity: Unknown Type of Exam: Unknown FINDINGS: Pneumomediastinum and pneumopericardium is noted. Trace biapical pneumothoraces are present. Diffuse bilateral airspace opacities are noted. Subcutaneous emphysematous changes are seen overlying the base of the neck. 1. Extensive bilateral airspace opacities without significant change. 2. Pneumomediastinum, pneumopericardium, and trace biapical pneumothoraces without significant change.      XR CHEST PORTABLE    Result Date: 11/1/2021  EXAMINATION: ONE XRAY VIEW OF THE CHEST 10/29/2021 2:04 am COMPARISON: October 28, 2021 HISTORY: ORDERING SYSTEM PROVIDED HISTORY: Hypoxia TECHNOLOGIST PROVIDED HISTORY: Reason for exam:->Hypoxia Reason for Exam: Hypoxia Acuity: Unknown Type of Exam: Subsequent/Follow-up Additional signs and symptoms: Hypoxia Relevant Medical/Surgical History: Hypoxia FINDINGS: No lines or tubes. Stable cardiomediastinal silhouette. Diffuse airspace opacities are unchanged. No significant pleural effusion. No pneumothorax. No acute osseous abnormality. 1. Stable diffuse airspace opacities. XR CHEST PORTABLE    Result Date: 11/1/2021  EXAMINATION: ONE XRAY VIEW OF THE CHEST 11/1/2021 10:58 am COMPARISON: None. HISTORY: ORDERING SYSTEM PROVIDED HISTORY: Pneumomediastinum, COVID-19 pneumonia TECHNOLOGIST PROVIDED HISTORY: Reason for exam:->Pneumomediastinum, COVID-19 pneumonia Reason for Exam: Pneumomediastinum, COVID-19 pneumonia FINDINGS: The cardiomediastinal silhouette is stable. There is stable pneumomediastinum. There are patchy airspace opacities bilaterally, may be related to pulmonary edema versus pneumonia. There is no pleural effusion. There is no pneumothorax. The osseous structures are stable. There is soft tissue emphysema along bilateral lower neck. Patchy airspace opacities bilaterally, may be related to pulmonary edema versus pneumonia. Stable pneumomediastinum No definite pneumothorax.      XR CHEST PORTABLE    Result Date: 10/31/2021  EXAMINATION: ONE XRAY VIEW OF THE CHEST 10/31/2021 9:01 am COMPARISON: 10/29/2021 HISTORY: ORDERING SYSTEM PROVIDED HISTORY: COVID-19 pneumonia with acute hypoxemic respiratory failure TECHNOLOGIST PROVIDED HISTORY: Reason for exam:->COVID-19 pneumonia with acute hypoxemic respiratory failure Reason for Exam: COVID-19 pneumonia with acute hypoxemic respiratory failure Acuity: Acute Type of Exam: Initial FINDINGS: New pneumomediastinum and subcutaneous emphysema in the right cervical region. Similar bilateral airspace opacities. Trace right apical pneumothorax. No gross bony abnormality. New pneumomediastinum, subcutaneous emphysema and trace right apical pneumothorax. XR CHEST PORTABLE    Result Date: 10/28/2021  EXAMINATION: ONE XRAY VIEW OF THE CHEST 10/28/2021 2:10 am COMPARISON: CT dated 10/27/2021 HISTORY: ORDERING SYSTEM PROVIDED HISTORY: Hypoxia TECHNOLOGIST PROVIDED HISTORY: Reason for exam:->Hypoxia Reason for Exam: Hypoxia Acuity: Acute Type of Exam: Initial FINDINGS: The heart size is upper normal.  There is extensive airspace disease bilaterally. A pneumothorax is not identified. Findings most consistent with severe bilateral COVID pneumonia. This appears progressed since the recent prior CT exam.     VL DUP UPPER EXTREMITY VENOUS BILATERAL    Result Date: 11/23/2021  EXAMINATION: DUPLEX ULTRASOUND OF THE BILATERAL UPPER EXTREMITIES FOR DVT, 11/23/2021 5:54 am TECHNIQUE: Duplex ultrasound using B-mode/gray scaled imaging and Doppler spectral analysis and color flow was obtained of the deep venous structures of the bilateral upper extremities. COMPARISON: None. HISTORY: ORDERING SYSTEM PROVIDED HISTORY: Persistnet unexplained fever, rule out DVT TECHNOLOGIST PROVIDED HISTORY: Reason for exam:->Persistnet unexplained fever, rule out DVT Reason for Exam: Swelling Acuity: Acute Type of Exam: Initial FINDINGS: There is normal flow and compressibility of the visualized venous structures. There is no evidence of echogenic thrombus. The veins demonstrate good compressibility with normal color flow study and spectral analysis. No evidence of DVT the upper extremities.      VL DUP LOWER EXTREMITY VENOUS BILATERAL    Result Date: 11/23/2021  EXAMINATION: DUPLEX VENOUS ULTRASOUND OF THE BILATERAL LOWER KAUAEASYOOT92/23/2021 5:55 am TECHNIQUE: Duplex ultrasound using B-mode/gray scaled imaging, Doppler spectral analysis and color flow Doppler was obtained of the deep venous structures of the lower bilateral extremities. COMPARISON: None. HISTORY: ORDERING SYSTEM PROVIDED HISTORY: persistent unexplained fever, rule out DVT TECHNOLOGIST PROVIDED HISTORY: Reason for exam:->persistent unexplained fever, rule out DVT Reason for Exam: Swelling Acuity: Acute FINDINGS: The visualized deep veins of the bilateral lower extremities are patent and free of echogenic thrombus. The deep veins demonstrate good compressibility with normal color flow study and spectral analysis. A superficial calf vein on the left medially is noncompressible, with visible thrombus. No evidence of DVT in either lower extremity. Superficial thrombosis is present in a medial left calf vein. CTA PULMONARY W CONTRAST    Result Date: 10/28/2021  EXAMINATION: CTA OF THE CHEST 10/27/2021 1:58 am TECHNIQUE: CTA of the chest was performed after the administration of intravenous contrast.  Multiplanar reformatted images are provided for review. MIP images are provided for review. Dose modulation, iterative reconstruction, and/or weight based adjustment of the mA/kV was utilized to reduce the radiation dose to as low as reasonably achievable. COMPARISON: None. HISTORY: ORDERING SYSTEM PROVIDED HISTORY: Shortness of breath, hypoxia, covid + TECHNOLOGIST PROVIDED HISTORY: Reason for exam:->Shortness of breath, hypoxia, covid + Decision Support Exception - unselect if not a suspected or confirmed emergency medical condition->Emergency Medical Condition (MA) Reason for Exam: Shortness of breath, hypoxia, covid + Type of Exam: Initial FINDINGS: Pulmonary Arteries: Pulmonary arteries are adequately opacified for evaluation. No evidence of intraluminal filling defect to suggest pulmonary embolism. Main pulmonary artery is normal in caliber. Mediastinum: Heart is normal in size without a pericardial effusion.   The aorta, arch branches, and main pulmonary artery are normal in course and caliber. Mildly prominent subcarinal lymph node measures up to 17 mm in short axis. Left hilar lymph node measures 18 x 16 mm. An enlarged right hilar lymph node measures 30 x 14 mm. Lungs/pleura: Lungs demonstrate diffuse ground-glass opacities. No significant pleural effusions. Central airways are patent. No bronchial wall thickening or bronchiectasis. No dominant or suspicious pulmonary nodules. Upper Abdomen: Limited images of the upper abdomen are unremarkable. Soft Tissues/Bones: No acute bone or soft tissue abnormality. 1. No pulmonary embolus. 2. Pulmonary opacities compatible with viral pneumonia. 3. Reactive mediastinal and hilar lymphadenopathy. XR ABDOMEN FOR NG/OG/NE TUBE PLACEMENT    Result Date: 11/8/2021  EXAMINATION: ONE SUPINE XRAY VIEW(S) OF THE ABDOMEN 11/8/2021 7:07 pm COMPARISON: 11/08/2021 chest x-ray HISTORY: ORDERING SYSTEM PROVIDED HISTORY: Confirmation of course of NG/OG/NE tube and location of tip of tube TECHNOLOGIST PROVIDED HISTORY: Reason for exam:->Confirmation of course of NG/OG/NE tube and location of tip of tube Portable? ->Yes Reason for Exam: Confirmation of course of NG/OG/NE tube and location of tip of tube Acuity: Acute Type of Exam: Initial Additional signs and symptoms: NA Relevant Medical/Surgical History: HYPERTENSION FINDINGS: Nasogastric tube tip is in the stomach with the side hole well past the GE junction. Endotracheal tube and right jugular central venous catheter again noted. Similar bilateral lung infiltrates.      Nasogastric tube tip is in the stomach     XR ABDOMEN (2 VIEWS)    Result Date: 11/14/2021  EXAMINATION: TWO XRAY VIEWS OF THE ABDOMEN 11/14/2021 10:17 am COMPARISON: 11/08/2021 HISTORY: ORDERING SYSTEM PROVIDED HISTORY: Rule out bowel obstruction TECHNOLOGIST PROVIDED HISTORY: Reason for exam:->Rule out bowel obstruction Reason for Exam: Rule out bowel obstruction FINDINGS: Enteric catheter remains coiled in the fundus of the stomach. No significant distention of bowel loops identified. No extraluminal gas. No abnormal calcifications.      No evidence for bowel obstruction Enteric catheter coiled in fundus of stomach           Kaleigh 1850

## 2021-11-23 NOTE — PROGRESS NOTES
Talked to Dr Mima Suárez about the probate paperwork family requested for us to fill out. He asked me to talk to CM about if its paperwork we can fill out. This nurse spoke with CM and they stated they wanted to speak with family before having Doctor fill out the paperwork. The paperwork was placed in pts chart.

## 2021-11-23 NOTE — PROGRESS NOTES
11/23-2nd attempt to scan pt. Spoke to [facility] is still on vent settings too high for travel. Portable oxygen isn't able to support pt's breathing demands. 11/-Can't come down. Vent settings to high.  Karen Hoyt RN

## 2021-11-23 NOTE — PROGRESS NOTES
Pulmonary and Critical Care  Progress Note      VITALS:  BP (!) 156/76   Pulse 105   Temp 99 °F (37.2 °C) (Rectal)   Resp 20   Ht 5' 9.02\" (1.753 m)   Wt 222 lb 10.6 oz (101 kg)   SpO2 91%   BMI 32.87 kg/m²     Subjective:   CHIEF COMPLAINT :SOB   HPI:                The patient is on the vent and sedated. He is on 100% fio2 but sats 97%. He is not in acute resp distress    Objective:   PHYSICAL EXAM:    LUNGS:Occasional basal crackles  Abd-soft, BS+,NT  Ext- no pedal edema  CVS-s1s2, no murmurs      DATA:    CBC:  Recent Labs     11/21/21  0510 11/22/21  0525 11/23/21  0530   WBC 7.7 7.8 8.6   RBC 3.10* 3.13* 2.97*   HGB 9.8* 10.0* 9.3*   HCT 30.5* 31.1* 29.6*    153 127*   MCV 98.4 99.4 99.7   MCH 31.6* 31.9* 31.3*   MCHC 32.1 32.2 31.4*   RDW 15.1* 15.0* 14.8   SEGSPCT 75.0* 86.0* 85.7*   BANDSPCT 10 2*  --       BMP:  Recent Labs     11/21/21  0510 11/22/21  0525 11/23/21  0530   * 152* 149*   K 3.7 3.7 3.9    104 104   CO2 34* 39* 38*   BUN 51* 50* 57*   CREATININE 0.9 0.8* 0.8*   CALCIUM 8.9 9.1 9.0   GLUCOSE 128* 120* 127*      ABG:  Recent Labs     11/21/21  0600 11/22/21  0600 11/23/21  0600   PH 7.47* 7.40 7.39   PO2ART 136* 73* 71*   BQC9AUY 60.0* 76.0* 74.0*   O2SAT 95.1* 94.0* 92.0*     BNP  No results found for: BNP   D-Dimer:  Lab Results   Component Value Date    DDIMER 657 (H) 11/23/2021      Radiology:   New left internal jugular line with its tip in the SVC.       Stable right internal jugular line, ET tube and enteric tube.       Diffuse patchy airspace and interstitial opacities noted throughout both   lungs.  Findings may represent an atypical viral pneumonia such as COVID-19   pneumonia in the proper clinical setting.       No pneumothorax.      1.       Assessment/Plan     Patient Active Problem List    Diagnosis Date Noted    Acute respiratory failure with hypoxia ([facility] [address] 11/16/2021    Cardiac arrest with ventricular fibrillation ([facility] [address] 11/16/2021    Septic shock ([facility] [address] 11/16/2021    Hematuria 11/16/2021    Pneumonia due to COVID-19 virus 10/27/2021   Hypernatremia- improving  s/p In hospital Cardiac arrest  VDRF  Acute Hypoxic resp failure- worsened  Bilateral Pneumonia sec to COVID-19  Obesity  MARIA ELENA- improved       1. Decadron  2. Insulin  3. Inhalers  4. Tube feeds  5. Need Trach and a PEG and LTAC- discussed with family too  10. Keep sats > 88%  7. PT/OT  8. SAT and SBT trial as tolerated  9.  C.w present management      D/w nursing and the patient's family    Electronically signed by Aviva Renee MD on 11/23/2021 at 1:37 PM

## 2021-11-23 NOTE — PROGRESS NOTES
PROCEDURE PERFORMED: [facility] CVC placement by Dr. Denney Stage: old CVC removal d/t fevers    INFORMED CONSENT:  Obtained prior to procedure. Consent placed in chart. RYAN SCORE PRE PROCEDURE:  NA    ASSESSMENT: Pt intubated & sedated    TIME OUT COMPLETE: 0907    BARRIER PRECAUTIONS & STERILE TECHNIQUE:               Pt in hospital bed and positioned reverse trendelenberg for comfort. Pt on Cardiac Monitor. Pt prepped and draped in a sterile fashion with chlorhexadine.     PAIN/LOCAL ANESTHESIA/SEDATION MANAGEMENT:           Local: Lidocaine given by Dr. Rahman Force:           ACCESS TIME: 0908          US/FLUORO: US 4 images          WIRE USED: Non-stiff micropuncture kit          CATHETER USED: 7Fr x 20cm triple lumen CVC   Sutured in place by Dr. [facility]: Silvia Self applied by Anoop Rosen RN    SPECIMENS: None    EBL: <1cc    FOLLOW- UP X-RAY: Stat ordered    COMPLICATIONS/ OUTCOME: None    STAFF PRESENT DURING PROCEDURE: Dr. Yann Gillette RN, Breann RN    RYAN SCORE POST PROCEDURE: NA    REPORT CALLED TO: Anayeli Nathan RN @ bedside    PT LEFT ROOM AT WHAT TIME: NA

## 2021-11-23 NOTE — PROGRESS NOTES
11/23/21 0710   Vent Information   $Ventilation $Subsequent Day   Vent Type 980   Vent Mode AC/PC   Vt Ordered 0 mL   Pressure Ordered 35   Rate Set 20 bmp   Peak Flow 0 L/min   Pressure Support 0 cmH20   FiO2  100 %   Sensitivity 3   PEEP/CPAP 14   I Time/ I Time % 1 s   Humidification Source HME   Nitric Oxide/Epoprostenol In Use? No   Vent Patient Data   High Peep/I Pressure 35   Peak Inspiratory Pressure 49 cmH2O   Mean Airway Pressure 26 cmH20   Rate Measured 20 br/min   Vt Exhaled 613 mL   Minute Volume 12.3 Liters   I:E Ratio 1:2.00   Plateau Pressure 43 [id number]   Static Compliance 22 mL/cmH2O   Dynamic Compliance 29 mL/cmH2O   Total PEEP 16 cmH20   Auto PEEP 1.5 cmH20   Cough/Sputum   Sputum How Obtained Endotracheal; Suctioned   $Obtained Sample $Induced Sputum   Sputum Amount Small   Sputum Color Creamy; Yellow; Tan   Tenacity Thick   Alarm Settings   High Pressure Alarm 55 cmH2O   Low Minute Volume Alarm 2.5 L/min   Apnea (secs) 20 secs   High Respiratory Rate 40 br/min   Low Exhaled Vt  250 mL   ETT (adult)   Placement Date/Time: 11/08/21 1122   Preoxygenation: Yes  Mask Ventilation: Ventilated by mask (1)  Technique: Direct laryngoscopy  Type: Cuffed  Tube Size: 8 mm  Laryngoscope: Mac  Blade Size: 3  Location: Oral  Insertion attempts: 1  Placement Verif. ..    Secured at 25 cm   Measured From Lips   ET Placement Right   Secured By Commercial tube culver   Site Condition Dry   Cuff Pressure   (mlt)

## 2021-11-23 NOTE — PLAN OF CARE
Problem: Skin Integrity:  Goal: Absence of new skin breakdown  Description: Absence of new skin breakdown  Outcome: Ongoing     Problem: Pain:  Goal: Pain level will decrease  Description: Pain level will decrease  Outcome: Ongoing  Goal: Control of acute pain  Description: Control of acute pain  Outcome: Ongoing  Goal: Control of chronic pain  Description: Control of chronic pain  Outcome: Ongoing  Goal: Patient's pain/discomfort is manageable  Description: Patient's pain/discomfort is manageable  Outcome: Ongoing     Problem: Airway Clearance - Ineffective  Goal: Achieve or maintain patent airway  Outcome: Ongoing     Problem: Gas Exchange - Impaired  Goal: Absence of hypoxia  Outcome: Ongoing  Goal: Promote optimal lung function  Outcome: Ongoing     Problem: Breathing Pattern - Ineffective  Goal: Ability to achieve and maintain a regular respiratory rate  Outcome: Ongoing     Problem: Falls - Risk of:  Goal: Will remain free from falls  Description: Will remain free from falls  Outcome: Ongoing  Goal: Absence of physical injury  Description: Absence of physical injury  Outcome: Ongoing     Problem: Nutrition  Goal: Optimal nutrition therapy  Outcome: Ongoing     Problem: Skin Integrity:  Goal: Will show no infection signs and symptoms  Description: Will show no infection signs and symptoms  Outcome: Ongoing  Goal: Absence of new skin breakdown  Description: Absence of new skin breakdown  Outcome: Ongoing     Problem: Infection:  Goal: Will remain free from infection  Description: Will remain free from infection  Outcome: Ongoing     Problem: Safety:  Goal: Free from accidental physical injury  Description: Free from accidental physical injury  Outcome: Ongoing  Goal: Free from intentional harm  Description: Free from intentional harm  Outcome: Ongoing     Problem: Daily Care:  Goal: Daily care needs are met  Description: Daily care needs are met  Outcome: Ongoing     Problem: Skin Integrity:  Goal: Skin integrity will stabilize  Description: Skin integrity will stabilize  Outcome: Ongoing     Problem: Discharge Planning:  Goal: Patients continuum of care needs are met  Description: Patients continuum of care needs are met  Outcome: Ongoing

## 2021-11-24 NOTE — PLAN OF CARE
Nutrition Problem #1: Inadequate oral intake  Intervention: Food and/or Nutrient Delivery: Continue Current Tube Feeding  Nutritional Goals: Pt will meet greater than 75% of estimated nutrient needs via EN

## 2021-11-24 NOTE — PROGRESS NOTES
11/24/21 0306   Vent Information   Vent Type 980   Vent Mode AC/PC   Vt Ordered 0 mL   Rate Set 20 bmp   Peak Flow 0 L/min   Pressure Support 0 cmH20   FiO2  100 %   SpO2 91 %   SpO2/FiO2 ratio 91   Sensitivity 3   PEEP/CPAP 18   I Time/ I Time % 1 s   Vent Patient Data   High Peep/I Pressure 35   Peak Inspiratory Pressure 54 cmH2O   Mean Airway Pressure 30 cmH20   Rate Measured 20 br/min   Vt Exhaled 695 mL   Minute Volume 13.9 Liters   I:E Ratio 1:2.00   Spontaneous Breathing Trial (SBT) RT Doc   Pulse 61   Additional Respiratory  Assessments   Resp 20   Alarm Settings   High Pressure Alarm 60 cmH2O   ETT (adult)   Placement Date/Time: 11/08/21 1122   Preoxygenation: Yes  Mask Ventilation: Ventilated by mask (1)  Technique: Direct laryngoscopy  Type: Cuffed  Tube Size: 8 mm  Laryngoscope: Mac  Blade Size: 3  Location: Oral  Insertion attempts: 1  Placement Verif. ..    Secured at 25 cm   Measured From [address] [place] [address],[address] By Commercial tube culver   Site Condition Dry

## 2021-11-24 NOTE — PROGRESS NOTES
Nephrology Progress Note  11/24/2021 7:48 AM        Subjective:   Admit Date: 10/26/2021  PCP: Clayton Gracia MD    Interval History: Patient remains on vent    Diet: Tube feed per OG    ROS: With assist control FiO2 100% PEEP of 22 and history of very high peak pressure of 58  Urine output 1.8 L/day  Sedated with Versed, fentanyl and Precedex    Data:     Current meds:    metoprolol  5 mg IntraVENous Once    methylPREDNISolone  70 mg IntraVENous Daily    ceftazidime-acibactam (AVYCAZ) infusion  2.5 g IntraVENous Q8H    minocycline  100 mg Oral BID    lidocaine   Topical Once    ipratropium  4 puff Inhalation Q4H WA    tamsulosin  0.4 mg Oral Daily    metoclopramide  10 mg IntraVENous Q8H    pantoprazole  40 mg IntraVENous Daily    atorvastatin  10 mg Oral Daily    sodium chloride flush  5-40 mL IntraVENous 2 times per day    enoxaparin  30 mg SubCUTAneous BID      dexmedetomidine (PRECEDEX) IV infusion 0.3 mcg/kg/hr (11/24/21 0136)    midazolam 10 mg/hr (11/23/21 8788)    fentaNYL 200 mcg/hr (11/24/21 9868)    sodium chloride Stopped (11/21/21 1729)         I/O last 3 completed shifts:   In: 2371.4 [I.V.:822.1; NG/GT:1174; IV Piggyback:375.3]  Out: 1800 [GRQNU:0295]    CBC:   Recent Labs     11/22/21  0525 11/23/21  0530 11/24/21  0550   WBC 7.8 8.6 9.9   HGB 10.0* 9.3* 8.8*    127* 132*          Recent Labs     11/22/21  0525 11/23/21  0530 11/24/21  0550   * 149* 149*   K 3.7 3.9 3.8    104 105   CO2 39* 38* 36*   BUN 50* 57* 60*   CREATININE 0.8* 0.8* 0.8*   GLUCOSE 120* 127* 117*       Lab Results   Component Value Date    CALCIUM 9.0 11/24/2021    PHOS 2.5 11/24/2021       Objective:     Vitals: BP (!) 101/58   Pulse 69   Temp 98 °F (36.7 °C) (Axillary)   Resp 20   Ht 5' 9.02\" (1.753 m)   Wt 222 lb 10.6 oz (101 kg)   SpO2 90%   BMI 32.87 kg/m²     General appearance: Intubated sedated, his wife and his son in the room  HEENT: No gross conjunctival pallor  Neck: Left IJ central venous catheter  Lungs: Coarse breath sound anteriorly  Heart: Seems regular rate and rhythm  Abdomen: Soft nontender  Extremities: Mild ankle edema  Has a Gómez catheter      Problem List :         Impression :     1. Acute kidney injury-by creatinine criteria-recovering-acceptable urine output  2. High sodium likely from free water deficit and from tube feed   3. COVID-19 with severe ARDS and multiorgan dysfunction-unfortunately is in the vicious cycle-as his FiO2 cannot be lowered hence he cannot get trach-and unable to wean him off either-he obviously has a very high peak pressure of 58  4. Underlying hypertension hyperlipidemia    Recommendation/Plan  :     1. I had a long talk with his wife and son-did not want to prolong his suffering unless he has any good chance of recovering and gaining quality of life  2. I have reviewed his chart-he has been here since October 27-on the vent for beyond 2 weeks-with his vent parameter-in reviewing the imaging study-I afraid he may not have chance of recovery  3. But I advised family to discuss with pulmonologist-as it is his or  her area of expertise-and then make a decision  4. Patient's daughter is in [address] [place] [address] she will not be able to return-  5. We will wait for family decision-and will go from there  6. Meanwhile continue supportive care  7.  If the plan is to continue aggressive therapy then I will add some free water per OBINNA Robles MD MD

## 2021-11-24 NOTE — PROGRESS NOTES
Comprehensive Nutrition Assessment    Type and Reason for Visit:  Reassess    Nutrition Recommendations/Plan:   Continue current EN regimen as ordered  Add proteinex 1x daily to help meet estimated protein needs  Will continue to monitor GI tolerance, nutrition status, poc    Nutrition Assessment:  pt remains sedated on vent, EN infusing @ goal rate of 70 ml/hr per flowsheet, will continue to follow pt at high nutrition risk    Malnutrition Assessment:  Malnutrition Status:  Severe malnutrition    Context:  Acute Illness       Estimated Daily Nutrient Needs:  Energy (kcal):  2100; Weight Used for Energy Requirements:  Current     Protein (g):  146+ (2+ g/kg IBW); Weight Used for Protein Requirements:  Ideal        Fluid (ml/day):  1600; Method Used for Fluid Requirements:  1 ml/kcal      Nutrition Related Findings:  Na 149, +precedex, fentanyl, and versed ggt      Wounds:  Pressure Injury, Stage II       Current Nutrition Therapies:    Current Tube Feeding (TF) Orders:  · Feeding Route: Nasogastric  · Formula: Peptide Based (Vital AF 1.2)  · Schedule: Continuous (70 ml/hr)  · Current TF & Flush Orders Provides: 2016 kcal and 126 g protein    Anthropometric Measures:  · Height: 5' 9.02\" (175.3 cm)  · Current Body Weight: 222 lb 10.6 oz (101 kg)   · Admission Body Weight: 242 lb 15.2 oz (110.2 kg) (first measured weight)    · Usual Body Weight: 254 lb (115.2 kg)     · Ideal Body Weight: 160 lbs; % Ideal Body Weight 139.2 %   · BMI: 32.9   · BMI Categories: Obese Class 1 (BMI 30.0-34. 9)       Nutrition Diagnosis:   · Inadequate oral intake related to acute injury/trauma as evidenced by NPO or clear liquid status due to medical condition    Nutrition Interventions:   Food and/or Nutrient Delivery:  Continue Current Tube Feeding  Nutrition Education/Counseling:  No recommendation at this time   Coordination of Nutrition Care:  Continue to monitor while inpatient    Goals:  Pt will meet greater than 75% of estimated nutrient needs via EN       Nutrition Monitoring and Evaluation:   Behavioral-Environmental Outcomes:  None Identified   Food/Nutrient Intake Outcomes:  Enteral Nutrition Intake/Tolerance  Physical Signs/Symptoms Outcomes:  Biochemical Data, GI Status, Fluid Status or Edema, Weight, Hemodynamic Status     Discharge Planning:     Too soon to determine     Electronically signed by Radha Patricia MS, RD, LD on 11/23/21 at 7:27 PM EST    Contact: [phone]

## 2021-11-24 NOTE — PROGRESS NOTES
11/24/21 0716   Vent Information   $Ventilation $Subsequent Day   Equipment Changed Suction catheter   Vent Type 980   Vent Mode AC/PC   Vt Ordered 0 mL   Rate Set 20 bmp   Peak Flow 0 L/min   Pressure Support 0 cmH20   FiO2  100 %   SpO2 92 %   SpO2/FiO2 ratio 92   Sensitivity 3   PEEP/CPAP 22   I Time/ I Time % 1 s   Vent Patient Data   High Peep/I Pressure 35   Peak Inspiratory Pressure 58 cmH2O   Mean Airway Pressure 34 cmH20   Rate Measured 20 br/min   Vt Exhaled 605 mL   Minute Volume 12.1 Liters   I:E Ratio 1:2.00   Plateau Pressure 55 [id number]   Static Compliance 18 mL/cmH2O   Total PEEP 25 cmH20   Auto PEEP 1.6 cmH20   Cough/Sputum   Sputum How Obtained Endotracheal; Suctioned   $Obtained Sample $Induced Sputum   Cough Productive   Sputum Amount Small   Sputum Color Creamy   Tenacity Thick   Spontaneous Breathing Trial (SBT) RT Doc   Pulse 69   Additional Respiratory  Assessments   Resp 21   Alarm Settings   High Pressure Alarm 60 cmH2O   Low Minute Volume Alarm 2.5 L/min   Apnea (secs) 20 secs   High Respiratory Rate 40 br/min   Low Exhaled Vt  250 mL   ETT (adult)   Placement Date/Time: 11/08/21 1122   Preoxygenation: Yes  Mask Ventilation: Ventilated by mask (1)  Technique: Direct laryngoscopy  Type: Cuffed  Tube Size: 8 mm  Laryngoscope: Mac  Blade Size: 3  Location: Oral  Insertion attempts: 1  Placement Verif. ..    Secured at 25 cm   Measured From Lips   ET Placement Left   Secured By Commercial tube culver   Site Condition Cool; [employer]

## 2021-11-24 NOTE — PROGRESS NOTES
11/24/21 0510   Vent Information   Vent Type 980   Vent Mode AC/PC   Vt Ordered 0 mL   Rate Set 20 bmp   Peak Flow 0 L/min   Pressure Support 0 cmH20   FiO2  100 %   SpO2 93 %   SpO2/FiO2 ratio 93   Sensitivity 3   PEEP/CPAP 22   I Time/ I Time % 1 s   Vent Patient Data   High Peep/I Pressure 35   Peak Inspiratory Pressure 58 cmH2O   Mean Airway Pressure 35 cmH20   Rate Measured 22 br/min   Vt Exhaled 589 mL   Minute Volume 12.4 Liters   I:E Ratio 1:2.00   Spontaneous Breathing Trial (SBT) RT Doc   Pulse 75   Additional Respiratory  Assessments   Resp 21   Alarm Settings   High Pressure Alarm 60 cmH2O

## 2021-11-24 NOTE — CARE COORDINATION
Cm received message from palliative care advising that family has decided to move forward with compassionate extubation today. Order for hospice consult has been placed but palliative care recommends that referral be called after pt is extubated pending survival.    1500 Pt extubated 1407. Cm spoke with nurse and pt has  therefore hospice consult not called.

## 2021-11-24 NOTE — PROGRESS NOTES
0430 pt o2 saturation dropped to 30%. Pt lavaged and suctioned multiple times as well as bagged. Ventilator settings increased to PEEP of 22, FiO2 100%. Family called and updated on pt status and are open to discuss palliative care options after speaking with physicians. Dr. Yuan Barroso with update and sons phone number. Pt o2 at this time is 93%.

## 2021-11-24 NOTE — PROGRESS NOTES
Pulmonary and Critical Care  Progress Note      VITALS:  BP 99/63   Pulse 71   Temp 98 °F (36.7 °C) (Axillary)   Resp (!) 35   Ht 5' 9.02\" (1.753 m)   Wt 222 lb 10.6 oz (101 kg)   SpO2 (!) 78%   BMI 32.87 kg/m²     Subjective:   CHIEF COMPLAINT :SOB     HPI:                The patient is on the vent and sedated. His condition has worsened and his CXR showed worsening ARDS. He is on 100% fio2 sats 80%    Objective:   PHYSICAL EXAM:    LUNGS:Bilateral crackles  Abd-soft, BS+,NT  Ext- no pedal edema  CVS-s1s2, no murmurs      DATA:    CBC:  Recent Labs     11/22/21  0525 11/23/21  0530 11/24/21  0550   WBC 7.8 8.6 9.9   RBC 3.13* 2.97* 2.80*   HGB 10.0* 9.3* 8.8*   HCT 31.1* 29.6* 26.5*    127* 132*   MCV 99.4 99.7 94.6   MCH 31.9* 31.3* 31.4*   MCHC 32.2 31.4* 33.2   RDW 15.0* 14.8 14.6   SEGSPCT 86.0* 85.7* 86.3*   BANDSPCT 2*  --   --       BMP:  Recent Labs     11/22/21  0525 11/23/21  0530 11/24/21  0550   * 149* 149*   K 3.7 3.9 3.8    104 105   CO2 39* 38* 36*   BUN 50* 57* 60*   CREATININE 0.8* 0.8* 0.8*   CALCIUM 9.1 9.0 9.0   GLUCOSE 120* 127* 117*      ABG:  Recent Labs     11/22/21  0600 11/23/21  0600 11/24/21  0600   PH 7.40 7.39 7.51*   PO2ART 73* 71* 56*   QLX2GTT 76.0* 74.0* 54.0*   O2SAT 94.0* 92.0* 90.0*     BNP  No results found for: BNP   D-Dimer:  Lab Results   Component Value Date    DDIMER 657 (H) 11/23/2021      Radiology:   Removal of right internal jugular line.  Other lines and tubes are stable.       Stable diffuse bilateral airspace disease suggesting atypical pneumonia.    COVID-19 pneumonia is in the differential.     1.       Assessment/Plan     Patient Active Problem List    Diagnosis Date Noted    Acute respiratory failure with hypoxia ([facility] [address] 11/16/2021    Cardiac arrest with ventricular fibrillation ([facility] [address] 11/16/2021    Septic shock ([facility] [address] 11/16/2021    Hematuria 11/16/2021    Pneumonia due to COVID-19 virus 10/27/2021   Hypernatremia- improving  s/p In hospital Cardiac arrest  VDRF  Acute Hypoxic resp failure- worsened  ARDS  Bilateral Pneumonia sec to COVID-19  Obesity  MARIA ELENA- improved       1. Need Board spectrum Abx  2. F/u C&S  3. Keep sats > 88%  4. Prone ventilation  5. Deep sedation, add Propofol  6. Paralytics for couple of days  7. ID consult  8. Tube feeds  9. PT/OT  10. No SAT and SBT for now  11. Prognosis guarded  12.  Palliative care eval    Discussed with the family and nursing and spent more than 30 mins of CC time    Electronically signed by Jagdish Arrington MD on 11/24/2021 at 11:46 AM

## 2021-11-24 NOTE — PROGRESS NOTES
Hospitalist Progress Note         Admit Date: 10/26/2021    PCP: Fabiola Breen MD     Chief Complaint   Patient presents with    Other     covid + low 02 at home symptoms started last thursday tested positive this weekend.  Shortness of Breath        Assessment and Plan:     Pneumonia due to COVID-19 virus  Acute hypoxic respiratory failure with ARDS  Cardiac arrest with V. Fib  Septic shock with multiorgan dysfunction  Acute renal failure  Hematuria  Moderate PCM    Patient completed Decadron, baricitinib and IVIG. ID on board. Completed broad-spectrum ABX vancomycin/Avycaz and minocycline   Patient is currently on FiO2 100% and PEEP of 20 and O2 sats still high 80s despite proning. Family meeting held with palliative care and decided to proceed with comfort care measures. Care plan discussed with family at bedside, understood and agreed. VTE prophylaxis LMWH  CODE STATUS changed to [facility] and comfort care orders placed  Compassionate weaning orders placed.   Time spent bedside >35 minutes    Current Facility-Administered Medications   Medication Dose Route Frequency Provider Last Rate Last Admin    morphine (PF) injection 2 mg  2 mg IntraVENous Q15 Min PRN Fatemeh Yang MD        LORazepam (ATIVAN) injection 0.5 mg  0.5 mg IntraVENous Q15 Min PRN Fatemeh Yang MD        glycopyrrolate (ROBINUL) injection 0.2 mg  0.2 mg IntraVENous Q4H PRN Fatemeh Yang MD   0.2 mg at 11/24/21 1353    methylPREDNISolone sodium (SOLU-MEDROL) injection 70 mg  70 mg IntraVENous Daily Sandie Haynes MD   70 mg at 11/24/21 0806    magnesium sulfate 1000 mg in dextrose 5% 100 mL IVPB  1,000 mg IntraVENous PRN Jim Arauz MD   Stopped at 11/12/21 2031    sodium chloride flush 0.9 % injection 5-40 mL  5-40 mL IntraVENous PRN Devin Sidhu MD        lidocaine (XYLOCAINE) 2 % jelly   Topical Once Devin Sidhu MD        ALPRAZolam Mick Brown) tablet 0.25 mg  0.25 mg Oral Q8H PRN Chucky Whitney Uma Danielle MD   0.25 mg at 11/07/21 1543    metoclopramide (REGLAN) injection 10 mg  10 mg IntraVENous Q8H Christina Tomlin MD   10 mg at 11/24/21 0452    0.9 % sodium chloride infusion  25 mL IntraVENous PRN Christina Tomlin MD   Stopped at 11/21/21 1729    pantoprazole (PROTONIX) injection 40 mg  40 mg IntraVENous Daily Christina Tomlin MD   40 mg at 11/24/21 0806    sodium chloride flush 0.9 % injection 5-40 mL  5-40 mL IntraVENous 2 times per day Arturo Aldana MD   10 mL at 11/24/21 0808    sodium chloride flush 0.9 % injection 5-40 mL  5-40 mL IntraVENous PRN Nile Cazares MD   10 mL at 11/06/21 0820    ondansetron (ZOFRAN-ODT) disintegrating tablet 4 mg  4 mg Oral Q8H PRN Nile Cazares MD   4 mg at 11/01/21 0307    Or    ondansetron (ZOFRAN) injection 4 mg  4 mg IntraVENous Q6H PRN Nile Cazares MD   4 mg at 11/06/21 0817    polyethylene glycol (GLYCOLAX) packet 17 g  17 g Oral Daily PRN Arturo Aldana MD   17 g at 11/05/21 2308    acetaminophen (TYLENOL) tablet 650 mg  650 mg Oral Q6H PRN Nile Cazares MD   650 mg at 11/21/21 0800    Or    acetaminophen (TYLENOL) suppository 650 mg  650 mg Rectal Q6H PRN Nile Cazares MD   650 mg at 11/22/21 1355       Subjective:     Patient intubated and sedated  Remains hypoxic despite high vent settings  Family meeting held today and wants compassionate weaning  Comfort care orders placed. Objective:        Intake/Output Summary (Last 24 hours) at 11/24/2021 1423  Last data filed at 11/24/2021 0755  Gross per 24 hour   Intake 2371.38 ml   Output 2150 ml   Net 221.38 ml      Vitals:   Vitals:    11/24/21 1126   BP:    Pulse: 71   Resp: (!) 35   Temp:    SpO2: (!) 78%     Physical Exam:  General Appearance:  Intubated and sedated  Lungs:    B/L rhonchi and diminished air entry +  Heart:                RRR, S1 and S2 normal, no murmur,   rub or gallop  Abdomen:     Soft, non-tender, bowel sounds +  Extremities:    No edema/clubbing/cyanosis  Neurological:   Unable to assess on sedation    Significant Diagnostic Studies:   DATA:    CBC   Recent Labs     11/22/21  0525 11/23/21  0530 11/24/21  0550   WBC 7.8 8.6 9.9   HGB 10.0* 9.3* 8.8*   HCT 31.1* 29.6* 26.5*    127* 132*      BMP   Recent Labs     11/22/21  0525 11/23/21  0530 11/24/21  0550   * 149* 149*   K 3.7 3.9 3.8    104 105   CO2 39* 38* 36*   PHOS 2.7 2.7 2.5   BUN 50* 57* 60*   CREATININE 0.8* 0.8* 0.8*     LFT'S No results for input(s): AST, ALT, ALB, BILIDIR, BILITOT, ALKPHOS in the last 72 hours. COAG No results for input(s): INR in the last 72 hours. POC:   Lab Results   Component Value Date    POCGLU 202 11/14/2021     VeznurosguT2W:No results found for: LABA1C  CARDIAC ENZYMES  No results for input(s): CKTOTAL, CKMB, CKMBINDEX, TROPONINI in the last 72 hours. Troponin: No results for input(s): TROPONINT in the last 72 hours. BNP: No results for input(s): PROBNP in the last 72 hours.   U/A:    Lab Results   Component Value Date    COLORU YELLOW 11/21/2021    WBCUA 3 11/21/2021    RBCUA 157 11/21/2021    MUCUS OCCASIONAL 11/21/2021    BACTERIA OCCASIONAL 11/21/2021    CLARITYU HAZY 11/21/2021    SPECGRAV 1.020 11/21/2021    LEUKOCYTESUR NEGATIVE 11/21/2021    BLOODU LARGE 11/21/2021       Echocardiogram complete 2D with doppler with color    Result Date: 11/12/2021  Transthoracic Echocardiography Report (TTE)  Demographics   Patient Name       Kern Boast D    Date of Study       11/12/2021   Date of Birth      1955         Gender              Male   Age                78 year(s)         Race                   Patient Number     [id number]         Room Number         2012   Visit Number       [id number]   Corporate ID       [id number]   Accession Number   [id number]         23 Ebonie Hinton, RVT   Ordering Physician Felisha Lassiter Interpreting        Arian Castro MD Physician           MD  Procedure Type of Study   TTE procedure:ECHOCARDIOGRAM COMPLETE 2D W DOPPLER W COLOR. Procedure Date Date: 11/12/2021 Start: 12:53 PM Study Location: Portable Technical Quality: Adequate visualization Indications:Cardiac arrest. Patient Status: Routine Height: 69 inches Weight: 244 pounds BSA: 2.25 m2 BMI: 36.03 kg/m2 HR: 75 bpm BP: 105/57 mmHg  Conclusions   Summary  Left ventricular systolic function is normal.  Ejection fraction is visually estimated at 55%. Mildly dilated right ventricle. No significant valvular disease noted. No evidence of any pericardial effusion. Signature   ------------------------------------------------------------------  Electronically signed by Ki Hill MD (Interpreting  physician) on 11/12/2021 at 03:00 PM  ------------------------------------------------------------------   Findings   Left Ventricle  Left ventricular systolic function is normal.  Ejection fraction is visually estimated at 55%. Indeterminate diastolic function; E/A flow reversal is noted. Left ventricle size is normal.  No regional wall motion abnormalities. Left Atrium  Essentially normal left atrium. Right Atrium  Prominent Eustachian valve. Right Ventricle  Mildly dilated right ventricle. Aortic Valve  Structurally normal aortic valve. Mitral Valve  Structurally normal mitral valve. Tricuspid Valve  Structurally normal tricuspid valve. Pulmonic Valve  The pulmonic valve was not well visualized. Pericardial Effusion  No evidence of any pericardial effusion. Pleural Effusion  No evidence of pleural effusion. Miscellaneous  The aorta is within normal limits. Inferior vena cava is dilated, measuring at 2.9 cm, and does not collapse  with respiration.   M-Mode/2D Measurements & Calculations   LV Diastolic Dimension:  LV Systolic Dimension:  LA Dimension: 3.1 cmAO Root  4.91 cm                  3.19 cm                 Dimension: 3.5 cmLA Area:  LV FS:35 %               LV Volume Diastolic:    02.5 cm2  LV PW Diastolic: 8.39 cm 026 ml  LV PW Systolic: 9.73 cm  LV Volume Systolic: 48  Septum Diastolic: 8.11   ml  cm                       LV EDV/LV EDV Index:    RV Diastolic Dimension:  Septum Systolic: 1.70 cm 624 TV/45 m2LV ESV/LV   3.95 cm  CO: 9.05 l/min           ESV Index: 48 ml/21 m2  CI: 4.02 l/m*m2          EF Calculated (A4C):    LA/Aorta: 0.89                           56.4 %  LV Area Diastolic: 31.7  EF Calculated (2D):     LA volume/Index: 54 ml  cm2                      64.1 %                  /87R4  LV Area Systolic: 80.7  cm2                      LV Length: 8.91 cm                            LVOT: 2.4 cm  Doppler Measurements & Calculations   MV Peak E-Wave: 62.2    AV Peak Velocity: 161 cm/s   LVOT Peak Velocity: 110  cm/s                    AV Peak Gradient: 10.37 mmHg cm/s  MV Peak A-Wave: 84.4    AV Mean Velocity: 124 cm/s   LVOT Mean Velocity:  cm/s                    AV Mean Gradient: 7 mmHg     87.2 cm/s  MV E/A Ratio: 0.74      AV VTI: 38.4 cm              LVOT Peak Gradient: 5  MV Peak Gradient: 1.55  AV Area (Continuity):3.14    mmHgLVOT Mean Gradient:  mmHg                    cm2                          3 mmHg   MV P1/2t: 52 msec       LVOT VTI: 26.7 cm  MVA by PHT:4.23 cm2   MV E' Septal Velocity:  6.91 cm/s  MV E' Lateral Velocity:  7.13 cm/s  MV E/E' septal: 9  MV E/E' lateral: 8.72      XR ABDOMEN (KUB) (SINGLE AP VIEW)    Result Date: 11/5/2021  EXAMINATION: ONE SUPINE XRAY VIEW(S) OF THE ABDOMEN 11/5/2021 9:04 pm COMPARISON: Lumbar spine radiographs 09/15/2015 HISTORY: ORDERING SYSTEM PROVIDED HISTORY: abdominal pain TECHNOLOGIST PROVIDED HISTORY: Reason for exam:->abdominal pain Reason for Exam: abdominal pain Acuity: Acute Type of Exam: Initial Additional signs and symptoms: na Relevant Medical/Surgical History: na FINDINGS: 2 images submitted for review. Nonspecific, nonobstructive bowel gas pattern.   Punctate calcifications project over the kidneys. No acute osseous abnormality is seen. There are degenerative changes in the lumbar spine. Pelvic phleboliths are redemonstrated. 1. No evidence of bowel obstruction. 2. Suspected bilateral nephrolithiasis. CT PELVIS WO CONTRAST Additional Contrast? None    Result Date: 11/7/2021  EXAMINATION: CT OF THE PELVIS WITHOUT CONTRAST 11/7/2021 1:29 am TECHNIQUE: CT of the pelvis was performed without the administration of intravenous contrast.  Multiplanar reformatted images are provided for review. Adjustment of mA and/or kV according to patient size was utilized. Dose modulation, iterative reconstruction, and/or weight based adjustment of the mA/kV was utilized to reduce the radiation dose to as low as reasonably achievable. COMPARISON: None. HISTORY: ORDERING SYSTEM PROVIDED HISTORY: Dalton TECHNOLOGIST PROVIDED HISTORY: Reason for exam:->Dalton Additional Contrast?->None Reason for Exam: pulled on dalton Acuity: Acute Type of Exam: Initial FINDINGS: The pelvic ring is intact. There is no fracture. Degenerative changes are noted along the sacroiliac joints. The bladder is partially distended with urine. The Dalton catheter has been retracted with balloon inflated in the urethra along the penile portion. Prostate and seminal vesicles are unremarkable. No inguinal or pelvic sidewall adenopathy. Vascular calcifications are noted in the iliac arteries. No inguinal or pelvic sidewall adenopathy. There is a left inguinal hernia containing fat. No appreciable soft tissue swelling. 1. A Dalton catheter has been pulled back with the balloon portion inflated in the penile urethra. Repositioning is recommended. The catheter is not within the bladder. XR CHEST PORTABLE    Result Date: 11/22/2021  EXAMINATION: ONE XRAY VIEW OF THE CHEST 11/22/2021 8:12 am COMPARISON: 11/19/2021.  HISTORY: ORDERING SYSTEM PROVIDED HISTORY: Hypoxia TECHNOLOGIST PROVIDED HISTORY: Reason for exam:->Hypoxia Reason for Exam: Hypoxia FINDINGS: The ET tube was in satisfactory position, 4.6 cm above the eladia. The NG tube is in the gastric body. A right jugular central venous line was noted with the tip in the superior vena cava. Bilateral pulmonary edema and or pneumonia was noted. Trace effusions may be blunting each costophrenic angle. The regional skeleton was unremarkable. No significant change has occurred from 11/19/2021. The ET tube was in satisfactory position, 4.6 cm above the eladia. NG tube is in the gastric body. Right jugular central venous line with the tip in the superior vena cava. Bilateral pulmonary edema and or pneumonia without cardiomegaly. Trace effusions may be blunting each costophrenic angle. No significant change from 11/19/2021. XR CHEST PORTABLE    Result Date: 11/19/2021  EXAMINATION: ONE XRAY VIEW OF THE CHEST 11/19/2021 5:19 am COMPARISON: 11/17/2021 HISTORY: ORDERING SYSTEM PROVIDED HISTORY: Hypoxia TECHNOLOGIST PROVIDED HISTORY: Reason for exam:->Hypoxia Reason for Exam: Hypoxia Acuity: Unknown Type of Exam: Subsequent/Follow-up Additional signs and symptoms: Hypoxia Relevant Medical/Surgical History: Hypoxia FINDINGS: The right costophrenic angle is excluded from the exam.  Cardiomediastinal silhouette is stable. Similar position of devices. Similar bilateral airspace opacities. No pleural effusion or pneumothorax. No gross bony abnormality. Stable chest.     XR CHEST PORTABLE    Result Date: 11/17/2021  EXAMINATION: ONE XRAY VIEW OF THE CHEST 11/17/2021 6:51 am COMPARISON: 11/15/2021 HISTORY: ORDERING SYSTEM PROVIDED HISTORY: Hypoxia TECHNOLOGIST PROVIDED HISTORY: Reason for exam:->Hypoxia Reason for Exam: Hypoxia Acuity: Unknown Type of Exam: Unknown FINDINGS: The endotracheal tube appears in similar position just above the thoracic inlet. The enteric catheter is coiled in the gastric fundus.   A right-sided small bore central venous catheter projects over the superior vena cava. Cardiac mediastinal silhouettes appear stable. Extensive multifocal infiltrates are seen throughout the lungs bilaterally. Osseous structures appear similar. Again identified are diffuse multifocal infiltrates throughout the lungs bilaterally with a similar appearance. XR CHEST PORTABLE    Result Date: 11/16/2021  EXAMINATION: ONE XRAY VIEW OF THE CHEST 11/15/2021 5:20 am COMPARISON: November 14, 2021 HISTORY: ORDERING SYSTEM PROVIDED HISTORY: ett placement TECHNOLOGIST PROVIDED HISTORY: Reason for exam:->ett placement Reason for Exam: ett placement Acuity: Unknown Type of Exam: Unknown FINDINGS: Stable lines and tubes including endotracheal tube, nasogastric tube, and right internal jugular central venous catheter. Stable cardiomediastinal silhouette. The pulmonary system demonstrates patchy ground-glass airspace opacities with small effusions. No pneumothorax. No new osseous abnormality. 1. Stable lines, tubes, and support devices. 2. Unchanged patchy ground-glass opacities with small effusions. XR CHEST PORTABLE    Result Date: 11/14/2021  EXAMINATION: ONE XRAY VIEW OF THE CHEST 11/14/2021 5:58 am COMPARISON: 11/13/2021 HISTORY: ORDERING SYSTEM PROVIDED HISTORY: ett placement TECHNOLOGIST PROVIDED HISTORY: Reason for exam:->ett placement Reason for Exam: ett placement Acuity: Unknown Type of Exam: Unknown FINDINGS: Again seen is an endotracheal tube with the tip at the level of the clavicular heads. There is redemonstration of right IJ central venous catheter an enteric tube, appearing grossly similar to the prior study. The cardiac silhouette appears magnified. There is redemonstration of diffuse interstitial and airspace opacities, appearing grossly similar to the prior study. There appears to be trace left pleural effusion. No evidence of pneumothorax seen.      Redemonstration of diffuse bilateral interstitial and airspace opacities, which may reflect multifocal pneumonia or pulmonary edema, appearing grossly similar to the prior study. Supporting devices as above, appearing grossly similar to the prior study. XR CHEST PORTABLE    Result Date: 11/13/2021  EXAMINATION: ONE XRAY VIEW OF THE CHEST 11/13/2021 6:07 am COMPARISON: Recent studies HISTORY: ORDERING SYSTEM PROVIDED HISTORY: ett placement TECHNOLOGIST PROVIDED HISTORY: Reason for exam:->ett placement Reason for Exam: ett placement Acuity: Unknown Type of Exam: Unknown FINDINGS: The ET tube is 6 cm superior to the eladia. Right-sided central line tip in the superior vena cava. Feeding tube is below the diaphragm. Severe diffuse infiltrates and/or congestion identified in the lungs. stable bony structures. Interval worsening. Severe diffuse infiltrates and/or congestion identified in the lungs. XR CHEST PORTABLE    Result Date: 11/12/2021  EXAMINATION: ONE XRAY VIEW OF THE CHEST 11/12/2021 2:20 am COMPARISON: 11/11/2021 and 11/10/2021 HISTORY: ORDERING SYSTEM PROVIDED HISTORY: mucous plug? hypoxia TECHNOLOGIST PROVIDED HISTORY: Reason for exam:->mucous plug? hypoxia Reason for Exam: mucous plug? hypoxia Acuity: Acute Type of Exam: Initial Mechanism of Injury: mucous plug? hypoxia Relevant Medical/Surgical History: mucous plug? hypoxia FINDINGS: Endotracheal tube, nasogastric tube, and right jugular line are redemonstrated. Lungs are hypoinflated. The apparent increase in the degree of opacities in the right lung may be due to the decreased inflation. Left lung is relatively stable and may have a small amount of left pleural effusion. No obvious pneumothorax is seen. Cardiac silhouette is not enlarged. Multifocal opacities are redemonstrated. The apparent worsening of the right lung may be due to decreased inflation.      XR CHEST PORTABLE    Result Date: 11/11/2021  EXAMINATION: ONE XRAY VIEW OF THE CHEST 11/11/2021 2:45 am COMPARISON: 11/10/2021 and 11/09/2021 HISTORY: ORDERING SYSTEM PROVIDED HISTORY: ett placement TECHNOLOGIST PROVIDED HISTORY: Reason for exam:->ett placement Reason for Exam: vent Acuity: Acute Type of Exam: Ongoing FINDINGS: Endotracheal tube, nasogastric tube, and right jugular line are acceptable. Multifocal opacities are again noted in both lungs. The distribution is similar to the recent exams. Cardiac silhouette is not enlarged. No pleural effusion or pneumothorax are identified. Stable chest x-ray with multifocal opacities. Lines and tubes are acceptable. XR CHEST PORTABLE    Result Date: 11/10/2021  EXAMINATION: ONE XRAY VIEW OF THE CHEST 11/9/2021 4:09 am COMPARISON: 11/08/2021 HISTORY: ORDERING SYSTEM PROVIDED HISTORY: ett placement TECHNOLOGIST PROVIDED HISTORY: Reason for exam:->ett placement Reason for Exam: vent Acuity: Acute Type of Exam: Ongoing FINDINGS: The endotracheal tube, nasogastric tube and right IJ catheter remain. There is no pneumothorax. Moderate to severe bilateral airspace disease appears similar to the previous exam.  A pneumothorax is not appreciated. No change in life support. Moderate to severe bilateral airspace disease, similar to the prior exam.     XR CHEST PORTABLE    Result Date: 11/10/2021  EXAMINATION: ONE XRAY VIEW OF THE CHEST 11/10/2021 3:50 am COMPARISON: Chest radiograph 11/09/2021 HISTORY: ORDERING SYSTEM PROVIDED HISTORY: ett placement TECHNOLOGIST PROVIDED HISTORY: Reason for exam:->ett placement Reason for Exam: vent Acuity: Acute Type of Exam: Ongoing FINDINGS: cardiomediastinal silhouette is unchanged. No definite pleural effusion or pneumothorax. Bilateral perihilar interstitial airspace opacities are unchanged. No acute osseous abnormality. Endotracheal tube in place with tip approximately 5 cm above the eladia. Enteric tube in place with tip and side port below the diaphragm and outside the field of view of this study.   Right IJ central venous catheter in place with tip at the superior cavoatrial junction, also unchanged. Similar appearance of extensive bilateral airspace opacities, Endotracheal tube in appropriate position in the midthoracic trachea. Remaining tubes and lines are also unchanged. XR CHEST PORTABLE    Result Date: 11/9/2021  EXAMINATION: ONE X-RAY VIEW OF THE CHEST 11/8/2021 1:16 pm COMPARISON: 11/05/2021 HISTORY: ORDERING SYSTEM PROVIDED HISTORY: ETT placement TECHNOLOGIST PROVIDED HISTORY: Reason for exam:->ett placement Reason for Exam: ETT and ng placement Type of Exam: Subsequent/Follow-up Initial evaluation FINDINGS: Monitor wires overlie the chest.  The patient has been intubated. The tip of the endotracheal tube is at the T4 level. There is a new right internal jugular central venous line with the tip in the superior vena cava. There is no pneumothorax. An NG tube courses below the level of the diaphragm and the tip is not included on this film. Patchy bilateral pulmonary opacities are unchanged. Endotracheal tube with the tip at T4. New right internal jugular central venous line with the tip in the superior vena cava. No pneumothorax. Bilateral pulmonary opacities are unchanged. XR CHEST PORTABLE    Result Date: 11/5/2021  EXAMINATION: ONE XRAY VIEW OF THE CHEST 11/5/2021 9:04 pm COMPARISON: 11/04/2021 HISTORY: ORDERING SYSTEM PROVIDED HISTORY: f/u on Trace biapical pneumothoraces pneumomediastinum and pneumopericardium TECHNOLOGIST PROVIDED HISTORY: Reason for exam:->f/u on Trace biapical pneumothoraces pneumomediastinum and pneumopericardium Reason for Exam: f/u on Trace biapical pneumothoraces pneumomediastinum and pneumopericardium Acuity: Acute Type of Exam: Initial Additional signs and symptoms: na Relevant Medical/Surgical History: hypertension FINDINGS: The cardiac silhouette and mediastinal contours are stable. There are bilateral lung infiltrates, unchanged. There is a tiny right apical pneumothorax. No left pneumothorax is identified. Pneumomediastinum is again noted. Subcutaneous emphysema is noted in the supraclavicular regions. The visualized osseous structures are unremarkable. 1. Bilateral lung infiltrates, compatible with pneumonia. 2. Trace right apical pneumothorax. 3. Pneumomediastinum. 4. No left pneumothorax. XR CHEST PORTABLE    Result Date: 11/4/2021  EXAMINATION: ONE XRAY VIEW OF THE CHEST 11/4/2021 1:57 am COMPARISON: Chest radiograph dated November 3, 2021 HISTORY: ORDERING SYSTEM PROVIDED HISTORY: Acute hypoxemic respiratory failure, Covid 19 pneumonia TECHNOLOGIST PROVIDED HISTORY: Reason for exam:->Acute hypoxemic respiratory failure, Covid 19 pneumonia Reason for Exam: Acute hypoxemic respiratory failure, Covid 19 pneumonia Acuity: Unknown Type of Exam: Subsequent/Follow-up Additional signs and symptoms: Acute hypoxemic respiratory failure, Covid 19 pneumonia Relevant Medical/Surgical History: Acute hypoxemic respiratory failure, Covid 19 pneumonia FINDINGS: Pneumomediastinum and pneumopericardium are noted without significant change. There is a small stable right apical pneumothorax. Trace left apical pneumothorax is noted. Bilateral airspace opacities are seen without significant change. Subcutaneous emphysematous changes are noted overlying the base of the neck. 1. Diffuse bilateral airspace opacities without significant change. 2. Trace biapical pneumothoraces pneumomediastinum and pneumopericardium with subcutaneous emphysematous changes involving the base the neck. No significant change.      XR CHEST PORTABLE    Result Date: 11/3/2021  EXAMINATION: ONE XRAY VIEW OF THE CHEST 11/3/2021 6:34 am COMPARISON: Chest radiograph dated November 1, 2021 HISTORY: ORDERING SYSTEM PROVIDED HISTORY: Bilateral Covid pneumonia, acute hypoxemic respiratory failure TECHNOLOGIST PROVIDED HISTORY: Reason for exam:->Bilateral Covid pneumonia, acute hypoxemic respiratory failure Reason for Exam: Bilateral Covid pneumonia, acute hypoxemic respiratory failure Acuity: Unknown Type of Exam: Unknown FINDINGS: Pneumomediastinum and pneumopericardium is noted. Trace biapical pneumothoraces are present. Diffuse bilateral airspace opacities are noted. Subcutaneous emphysematous changes are seen overlying the base of the neck. 1. Extensive bilateral airspace opacities without significant change. 2. Pneumomediastinum, pneumopericardium, and trace biapical pneumothoraces without significant change. XR CHEST PORTABLE    Result Date: 11/1/2021  EXAMINATION: ONE XRAY VIEW OF THE CHEST 10/29/2021 2:04 am COMPARISON: October 28, 2021 HISTORY: ORDERING SYSTEM PROVIDED HISTORY: Hypoxia TECHNOLOGIST PROVIDED HISTORY: Reason for exam:->Hypoxia Reason for Exam: Hypoxia Acuity: Unknown Type of Exam: Subsequent/Follow-up Additional signs and symptoms: Hypoxia Relevant Medical/Surgical History: Hypoxia FINDINGS: No lines or tubes. Stable cardiomediastinal silhouette. Diffuse airspace opacities are unchanged. No significant pleural effusion. No pneumothorax. No acute osseous abnormality. 1. Stable diffuse airspace opacities. XR CHEST PORTABLE    Result Date: 11/1/2021  EXAMINATION: ONE XRAY VIEW OF THE CHEST 11/1/2021 10:58 am COMPARISON: None. HISTORY: ORDERING SYSTEM PROVIDED HISTORY: Pneumomediastinum, COVID-19 pneumonia TECHNOLOGIST PROVIDED HISTORY: Reason for exam:->Pneumomediastinum, COVID-19 pneumonia Reason for Exam: Pneumomediastinum, COVID-19 pneumonia FINDINGS: The cardiomediastinal silhouette is stable. There is stable pneumomediastinum. There are patchy airspace opacities bilaterally, may be related to pulmonary edema versus pneumonia. There is no pleural effusion. There is no pneumothorax. The osseous structures are stable. There is soft tissue emphysema along bilateral lower neck. Patchy airspace opacities bilaterally, may be related to pulmonary edema versus pneumonia.  Stable pneumomediastinum No definite pneumothorax. XR CHEST PORTABLE    Result Date: 10/31/2021  EXAMINATION: ONE XRAY VIEW OF THE CHEST 10/31/2021 9:01 am COMPARISON: 10/29/2021 HISTORY: ORDERING SYSTEM PROVIDED HISTORY: COVID-19 pneumonia with acute hypoxemic respiratory failure TECHNOLOGIST PROVIDED HISTORY: Reason for exam:->COVID-19 pneumonia with acute hypoxemic respiratory failure Reason for Exam: COVID-19 pneumonia with acute hypoxemic respiratory failure Acuity: Acute Type of Exam: Initial FINDINGS: New pneumomediastinum and subcutaneous emphysema in the right cervical region. Similar bilateral airspace opacities. Trace right apical pneumothorax. No gross bony abnormality. New pneumomediastinum, subcutaneous emphysema and trace right apical pneumothorax. XR CHEST PORTABLE    Result Date: 10/28/2021  EXAMINATION: ONE XRAY VIEW OF THE CHEST 10/28/2021 2:10 am COMPARISON: CT dated 10/27/2021 HISTORY: ORDERING SYSTEM PROVIDED HISTORY: Hypoxia TECHNOLOGIST PROVIDED HISTORY: Reason for exam:->Hypoxia Reason for Exam: Hypoxia Acuity: Acute Type of Exam: Initial FINDINGS: The heart size is upper normal.  There is extensive airspace disease bilaterally. A pneumothorax is not identified. Findings most consistent with severe bilateral COVID pneumonia. This appears progressed since the recent prior CT exam.     VL DUP UPPER EXTREMITY VENOUS BILATERAL    Result Date: 11/23/2021  EXAMINATION: DUPLEX ULTRASOUND OF THE BILATERAL UPPER EXTREMITIES FOR DVT, 11/23/2021 5:54 am TECHNIQUE: Duplex ultrasound using B-mode/gray scaled imaging and Doppler spectral analysis and color flow was obtained of the deep venous structures of the bilateral upper extremities. COMPARISON: None.  HISTORY: ORDERING SYSTEM PROVIDED HISTORY: Persistnet unexplained fever, rule out DVT TECHNOLOGIST PROVIDED HISTORY: Reason for exam:->Persistnet unexplained fever, rule out DVT Reason for Exam: Swelling Acuity: Acute Type of Exam: Initial FINDINGS: There is normal flow and compressibility of the visualized venous structures. There is no evidence of echogenic thrombus. The veins demonstrate good compressibility with normal color flow study and spectral analysis. No evidence of DVT the upper extremities. VL DUP LOWER EXTREMITY VENOUS BILATERAL    Result Date: 11/23/2021  EXAMINATION: DUPLEX VENOUS ULTRASOUND OF THE BILATERAL LOWER [id number]/23/2021 5:55 am TECHNIQUE: Duplex ultrasound using B-mode/gray scaled imaging, Doppler spectral analysis and color flow Doppler was obtained of the deep venous structures of the lower bilateral extremities. COMPARISON: None. HISTORY: ORDERING SYSTEM PROVIDED HISTORY: persistent unexplained fever, rule out DVT TECHNOLOGIST PROVIDED HISTORY: Reason for exam:->persistent unexplained fever, rule out DVT Reason for Exam: Swelling Acuity: Acute FINDINGS: The visualized deep veins of the bilateral lower extremities are patent and free of echogenic thrombus. The deep veins demonstrate good compressibility with normal color flow study and spectral analysis. A superficial calf vein on the left medially is noncompressible, with visible thrombus. No evidence of DVT in either lower extremity. Superficial thrombosis is present in a medial left calf vein. CTA PULMONARY W CONTRAST    Result Date: 10/28/2021  EXAMINATION: CTA OF THE CHEST 10/27/2021 1:58 am TECHNIQUE: CTA of the chest was performed after the administration of intravenous contrast.  Multiplanar reformatted images are provided for review. MIP images are provided for review. Dose modulation, iterative reconstruction, and/or weight based adjustment of the mA/kV was utilized to reduce the radiation dose to as low as reasonably achievable. COMPARISON: None.  HISTORY: ORDERING SYSTEM PROVIDED HISTORY: Shortness of breath, hypoxia, covid + TECHNOLOGIST PROVIDED HISTORY: Reason for exam:->Shortness of breath, hypoxia, covid + Decision Support Exception - unselect if not a central venous catheter again noted. Similar bilateral lung infiltrates. Nasogastric tube tip is in the stomach     XR ABDOMEN (2 VIEWS)    Result Date: 11/14/2021  EXAMINATION: TWO XRAY VIEWS OF THE ABDOMEN 11/14/2021 10:17 am COMPARISON: 11/08/2021 HISTORY: ORDERING SYSTEM PROVIDED HISTORY: Rule out bowel obstruction TECHNOLOGIST PROVIDED HISTORY: Reason for exam:->Rule out bowel obstruction Reason for Exam: Rule out bowel obstruction FINDINGS: Enteric catheter remains coiled in the fundus of the stomach. No significant distention of bowel loops identified. No extraluminal gas. No abnormal calcifications.      No evidence for bowel obstruction Enteric catheter coiled in fundus of stomach           Kaleigh 1850

## 2021-11-24 NOTE — PROGRESS NOTES
Vent settings remain too high to consider trach, will re-evaluate for improvement next week.     Electronically signed by Carolyn Baker MD on 11/24/2021 at 9:54 AM

## 2021-11-24 NOTE — DISCHARGE SUMMARY
Death Summary   Hilario James  2021    Zac Reilly MD, M.D. Other diagnoses:   As below    Pneumonia due to COVID-19 virus    Patient Active Problem List   Diagnosis    Pneumonia due to COVID-19 virus    Acute respiratory failure with hypoxia ([address]    Cardiac arrest with ventricular fibrillation ([address]    Septic shock ([address]    Hematuria       Date of admission: 10/27/2021    Date of death: 2021    Cause of death: Cardiorespiratory arrest    Secondary cause of death:     Pneumonia due to COVID-19 virus  Acute hypoxic respiratory failure with ARDS  Cardiac arrest with V. Fib  Septic shock with multiorgan dysfunction  Acute renal failure  Hematuria  Moderate PCM    Time of death: 1431 hrs. Hospital course: Hilario James is a 78 y.o.  male  who presented with shortness of breath due to COVID-19 pneumonia. Patient was initially kept on BiPAP but later he went into cardiac arrest with V. fib-eventually got intubated for worsening respiratory failure. Continue to get worse with septic shock and multiorgan dysfunction. Evaluated by pulmonology, ID, nephrology, cardiology, urology and palliative care services. Patient continued to deteriorate due to worsening ARDS with maximum vent settings. Due to expected poor outcome, son(POA) decided to proceed with compassionate weaning and CODE STATUS changed to DNR CC. Patient eventually  with cardiorespiratory arrest at 1431 hrs.       Zac Reilly MD, MD.

## 2021-11-25 LAB
CULTURE: ABNORMAL
CULTURE: ABNORMAL
CULTURE: NORMAL
Lab: ABNORMAL
Lab: NORMAL
SPECIMEN: ABNORMAL
SPECIMEN: NORMAL

## 2021-11-27 LAB
CULTURE: NORMAL
CULTURE: NORMAL
Lab: NORMAL
Lab: NORMAL
SPECIMEN: NORMAL
SPECIMEN: NORMAL

## 2021-11-28 LAB
CULTURE: NORMAL
Lab: NORMAL
SPECIMEN: NORMAL

## 2021-11-30 LAB
Lab: NORMAL
Lab: NORMAL
SPECIMEN: NORMAL
SPECIMEN: NORMAL